# Patient Record
Sex: FEMALE | Race: BLACK OR AFRICAN AMERICAN | NOT HISPANIC OR LATINO | Employment: FULL TIME | URBAN - METROPOLITAN AREA
[De-identification: names, ages, dates, MRNs, and addresses within clinical notes are randomized per-mention and may not be internally consistent; named-entity substitution may affect disease eponyms.]

---

## 2023-12-23 ENCOUNTER — APPOINTMENT (OUTPATIENT)
Dept: RADIOLOGY | Facility: HOSPITAL | Age: 48
End: 2023-12-23
Payer: COMMERCIAL

## 2023-12-23 ENCOUNTER — HOSPITAL ENCOUNTER (EMERGENCY)
Facility: HOSPITAL | Age: 48
Discharge: HOME | End: 2023-12-24
Attending: STUDENT IN AN ORGANIZED HEALTH CARE EDUCATION/TRAINING PROGRAM
Payer: COMMERCIAL

## 2023-12-23 DIAGNOSIS — R10.9 FLANK PAIN: ICD-10-CM

## 2023-12-23 DIAGNOSIS — B37.9 YEAST INFECTION: ICD-10-CM

## 2023-12-23 DIAGNOSIS — N39.0 ACUTE UTI: Primary | ICD-10-CM

## 2023-12-23 LAB
ALBUMIN SERPL BCP-MCNC: 4.8 G/DL (ref 3.4–5)
ALP SERPL-CCNC: 74 U/L (ref 33–110)
ALT SERPL W P-5'-P-CCNC: 15 U/L (ref 7–45)
ANION GAP SERPL CALC-SCNC: 12 MMOL/L (ref 10–20)
APPEARANCE UR: ABNORMAL
AST SERPL W P-5'-P-CCNC: 14 U/L (ref 9–39)
BACTERIA #/AREA URNS AUTO: ABNORMAL /HPF
BASOPHILS # BLD AUTO: 0.04 X10*3/UL (ref 0–0.1)
BASOPHILS NFR BLD AUTO: 0.3 %
BILIRUB SERPL-MCNC: 0.8 MG/DL (ref 0–1.2)
BILIRUB UR STRIP.AUTO-MCNC: NEGATIVE MG/DL
BUN SERPL-MCNC: 15 MG/DL (ref 6–23)
CALCIUM SERPL-MCNC: 10.2 MG/DL (ref 8.6–10.3)
CARDIAC TROPONIN I PNL SERPL HS: 3 NG/L (ref 0–13)
CHLORIDE SERPL-SCNC: 102 MMOL/L (ref 98–107)
CO2 SERPL-SCNC: 28 MMOL/L (ref 21–32)
COLOR UR: YELLOW
CREAT SERPL-MCNC: 0.96 MG/DL (ref 0.5–1.05)
EOSINOPHIL # BLD AUTO: 0.23 X10*3/UL (ref 0–0.7)
EOSINOPHIL NFR BLD AUTO: 1.8 %
ERYTHROCYTE [DISTWIDTH] IN BLOOD BY AUTOMATED COUNT: 13.3 % (ref 11.5–14.5)
GFR SERPL CREATININE-BSD FRML MDRD: 74 ML/MIN/1.73M*2
GLUCOSE SERPL-MCNC: 99 MG/DL (ref 74–99)
GLUCOSE UR STRIP.AUTO-MCNC: NEGATIVE MG/DL
HCT VFR BLD AUTO: 41.7 % (ref 36–46)
HGB BLD-MCNC: 14.5 G/DL (ref 12–16)
IMM GRANULOCYTES # BLD AUTO: 0.1 X10*3/UL (ref 0–0.7)
IMM GRANULOCYTES NFR BLD AUTO: 0.8 % (ref 0–0.9)
KETONES UR STRIP.AUTO-MCNC: NEGATIVE MG/DL
LEUKOCYTE ESTERASE UR QL STRIP.AUTO: ABNORMAL
LIPASE SERPL-CCNC: 60 U/L (ref 9–82)
LYMPHOCYTES # BLD AUTO: 3.5 X10*3/UL (ref 1.2–4.8)
LYMPHOCYTES NFR BLD AUTO: 28 %
MCH RBC QN AUTO: 31 PG (ref 26–34)
MCHC RBC AUTO-ENTMCNC: 34.8 G/DL (ref 32–36)
MCV RBC AUTO: 89 FL (ref 80–100)
MONOCYTES # BLD AUTO: 0.44 X10*3/UL (ref 0.1–1)
MONOCYTES NFR BLD AUTO: 3.5 %
MUCOUS THREADS #/AREA URNS AUTO: ABNORMAL /LPF
NEUTROPHILS # BLD AUTO: 8.21 X10*3/UL (ref 1.2–7.7)
NEUTROPHILS NFR BLD AUTO: 65.6 %
NITRITE UR QL STRIP.AUTO: POSITIVE
NRBC BLD-RTO: 0 /100 WBCS (ref 0–0)
PH UR STRIP.AUTO: 6 [PH]
PLATELET # BLD AUTO: 309 X10*3/UL (ref 150–450)
POTASSIUM SERPL-SCNC: 4.2 MMOL/L (ref 3.5–5.3)
PROT SERPL-MCNC: 7.9 G/DL (ref 6.4–8.2)
PROT UR STRIP.AUTO-MCNC: NEGATIVE MG/DL
RBC # BLD AUTO: 4.67 X10*6/UL (ref 4–5.2)
RBC # UR STRIP.AUTO: NEGATIVE /UL
RBC #/AREA URNS AUTO: ABNORMAL /HPF
SODIUM SERPL-SCNC: 138 MMOL/L (ref 136–145)
SP GR UR STRIP.AUTO: 1.01
SQUAMOUS #/AREA URNS AUTO: ABNORMAL /HPF
UROBILINOGEN UR STRIP.AUTO-MCNC: 4 MG/DL
WBC # BLD AUTO: 12.5 X10*3/UL (ref 4.4–11.3)
WBC #/AREA URNS AUTO: ABNORMAL /HPF
WBC CLUMPS #/AREA URNS AUTO: ABNORMAL /HPF

## 2023-12-23 PROCEDURE — 99284 EMERGENCY DEPT VISIT MOD MDM: CPT | Performed by: STUDENT IN AN ORGANIZED HEALTH CARE EDUCATION/TRAINING PROGRAM

## 2023-12-23 PROCEDURE — 36415 COLL VENOUS BLD VENIPUNCTURE: CPT | Performed by: PHYSICIAN ASSISTANT

## 2023-12-23 PROCEDURE — 2550000001 HC RX 255 CONTRASTS: Performed by: PHYSICIAN ASSISTANT

## 2023-12-23 PROCEDURE — 99285 EMERGENCY DEPT VISIT HI MDM: CPT | Mod: 25

## 2023-12-23 PROCEDURE — 83690 ASSAY OF LIPASE: CPT | Performed by: PHYSICIAN ASSISTANT

## 2023-12-23 PROCEDURE — 81001 URINALYSIS AUTO W/SCOPE: CPT | Performed by: PHYSICIAN ASSISTANT

## 2023-12-23 PROCEDURE — 74177 CT ABD & PELVIS W/CONTRAST: CPT

## 2023-12-23 PROCEDURE — 71046 X-RAY EXAM CHEST 2 VIEWS: CPT

## 2023-12-23 PROCEDURE — 2500000004 HC RX 250 GENERAL PHARMACY W/ HCPCS (ALT 636 FOR OP/ED): Performed by: STUDENT IN AN ORGANIZED HEALTH CARE EDUCATION/TRAINING PROGRAM

## 2023-12-23 PROCEDURE — 96365 THER/PROPH/DIAG IV INF INIT: CPT

## 2023-12-23 PROCEDURE — 96375 TX/PRO/DX INJ NEW DRUG ADDON: CPT

## 2023-12-23 PROCEDURE — 74177 CT ABD & PELVIS W/CONTRAST: CPT | Performed by: RADIOLOGY

## 2023-12-23 PROCEDURE — 80053 COMPREHEN METABOLIC PANEL: CPT | Performed by: PHYSICIAN ASSISTANT

## 2023-12-23 PROCEDURE — 87086 URINE CULTURE/COLONY COUNT: CPT | Mod: PARLAB | Performed by: PHYSICIAN ASSISTANT

## 2023-12-23 PROCEDURE — 96361 HYDRATE IV INFUSION ADD-ON: CPT

## 2023-12-23 PROCEDURE — 71046 X-RAY EXAM CHEST 2 VIEWS: CPT | Performed by: RADIOLOGY

## 2023-12-23 PROCEDURE — 85025 COMPLETE CBC W/AUTO DIFF WBC: CPT | Performed by: PHYSICIAN ASSISTANT

## 2023-12-23 PROCEDURE — 84484 ASSAY OF TROPONIN QUANT: CPT | Performed by: PHYSICIAN ASSISTANT

## 2023-12-23 RX ORDER — CEFTRIAXONE 1 G/50ML
1 INJECTION, SOLUTION INTRAVENOUS ONCE
Status: COMPLETED | OUTPATIENT
Start: 2023-12-23 | End: 2023-12-23

## 2023-12-23 RX ORDER — ONDANSETRON HYDROCHLORIDE 2 MG/ML
4 INJECTION, SOLUTION INTRAVENOUS ONCE
Status: COMPLETED | OUTPATIENT
Start: 2023-12-23 | End: 2023-12-23

## 2023-12-23 RX ORDER — CEPHALEXIN 500 MG/1
500 CAPSULE ORAL 4 TIMES DAILY
Qty: 40 CAPSULE | Refills: 0 | Status: SHIPPED | OUTPATIENT
Start: 2023-12-23 | End: 2024-01-02

## 2023-12-23 RX ADMIN — HYDROMORPHONE HYDROCHLORIDE 0.5 MG: 1 INJECTION, SOLUTION INTRAMUSCULAR; INTRAVENOUS; SUBCUTANEOUS at 21:27

## 2023-12-23 RX ADMIN — SODIUM CHLORIDE, POTASSIUM CHLORIDE, SODIUM LACTATE AND CALCIUM CHLORIDE 1000 ML: 600; 310; 30; 20 INJECTION, SOLUTION INTRAVENOUS at 21:21

## 2023-12-23 RX ADMIN — CEFTRIAXONE SODIUM 1 G: 1 INJECTION, SOLUTION INTRAVENOUS at 20:21

## 2023-12-23 RX ADMIN — IOHEXOL 75 ML: 350 INJECTION, SOLUTION INTRAVENOUS at 22:19

## 2023-12-23 RX ADMIN — ONDANSETRON 4 MG: 2 INJECTION INTRAMUSCULAR; INTRAVENOUS at 21:21

## 2023-12-23 ASSESSMENT — COLUMBIA-SUICIDE SEVERITY RATING SCALE - C-SSRS
1. IN THE PAST MONTH, HAVE YOU WISHED YOU WERE DEAD OR WISHED YOU COULD GO TO SLEEP AND NOT WAKE UP?: NO
2. HAVE YOU ACTUALLY HAD ANY THOUGHTS OF KILLING YOURSELF?: NO
6. HAVE YOU EVER DONE ANYTHING, STARTED TO DO ANYTHING, OR PREPARED TO DO ANYTHING TO END YOUR LIFE?: NO

## 2023-12-23 ASSESSMENT — PAIN - FUNCTIONAL ASSESSMENT
PAIN_FUNCTIONAL_ASSESSMENT: 0-10
PAIN_FUNCTIONAL_ASSESSMENT: 0-10

## 2023-12-23 ASSESSMENT — PAIN SCALES - GENERAL
PAINLEVEL_OUTOF10: 10 - WORST POSSIBLE PAIN
PAINLEVEL_OUTOF10: 9

## 2023-12-23 ASSESSMENT — LIFESTYLE VARIABLES
EVER FELT BAD OR GUILTY ABOUT YOUR DRINKING: NO
REASON UNABLE TO ASSESS: NO
EVER HAD A DRINK FIRST THING IN THE MORNING TO STEADY YOUR NERVES TO GET RID OF A HANGOVER: NO
HAVE PEOPLE ANNOYED YOU BY CRITICIZING YOUR DRINKING: NO
HAVE YOU EVER FELT YOU SHOULD CUT DOWN ON YOUR DRINKING: NO

## 2023-12-24 VITALS
SYSTOLIC BLOOD PRESSURE: 123 MMHG | HEART RATE: 87 BPM | DIASTOLIC BLOOD PRESSURE: 75 MMHG | RESPIRATION RATE: 16 BRPM | OXYGEN SATURATION: 97 % | TEMPERATURE: 98.6 F | WEIGHT: 236 LBS

## 2023-12-24 LAB — HOLD SPECIMEN: NORMAL

## 2023-12-24 RX ORDER — FLUCONAZOLE 200 MG/1
200 TABLET ORAL DAILY
Qty: 7 TABLET | Refills: 0 | Status: SHIPPED | OUTPATIENT
Start: 2023-12-24 | End: 2023-12-31

## 2023-12-24 RX ORDER — ONDANSETRON 4 MG/1
4 TABLET, ORALLY DISINTEGRATING ORAL EVERY 8 HOURS PRN
Qty: 30 TABLET | Refills: 0 | Status: SHIPPED | OUTPATIENT
Start: 2023-12-24

## 2023-12-24 ASSESSMENT — PAIN SCALES - GENERAL: PAINLEVEL_OUTOF10: 3

## 2023-12-24 NOTE — ED PROVIDER NOTES
HPI   Chief Complaint   Patient presents with    Flank Pain    Blood in Urine    Chest Pain     Pt arrives private auto from home. States bilateral flank pain worse on left since Thursday. States feels pain in chest now and all over body. States blood when urinating. Hx of kidney stones, uti's. Has been taking left over cipro she had @ home for past few days.        HPI     Patient is a pleasant 48-year-old female presenting to the emergency department today in the setting of flank pain, worse on the left.  Began around Thursday.  States that she is noticing pain with urinating, incomplete feeling of emptying her bladder as well as she feels like she might have another kidney stone.  She tried taking some Cipro that she had at home for the last couple days.  Given lack of improvement came in for evaluation here.  States that she has had chills at home as well.  No cough, headache, vision changes, shortness of breath.               No data recorded                Patient History   History reviewed. No pertinent past medical history.  History reviewed. No pertinent surgical history.  No family history on file.  Social History     Tobacco Use    Smoking status: Not on file    Smokeless tobacco: Not on file   Substance Use Topics    Alcohol use: Not on file    Drug use: Not on file       Physical Exam   ED Triage Vitals [12/23/23 1605]   Temp Heart Rate Resp BP   37 °C (98.6 °F) 91 18 123/75      SpO2 Temp src Heart Rate Source Patient Position   97 % -- -- --      BP Location FiO2 (%)     -- --       Physical Exam  Vitals and nursing note reviewed.   Constitutional:       General: She is not in acute distress.     Appearance: She is well-developed.   HENT:      Head: Normocephalic and atraumatic.   Eyes:      Conjunctiva/sclera: Conjunctivae normal.   Cardiovascular:      Rate and Rhythm: Normal rate and regular rhythm.      Heart sounds: No murmur heard.  Pulmonary:      Effort: Pulmonary effort is normal. No  respiratory distress.      Breath sounds: Normal breath sounds.   Abdominal:      Palpations: Abdomen is soft.      Tenderness: There is no abdominal tenderness. There is left CVA tenderness. There is no right CVA tenderness.   Musculoskeletal:         General: No swelling.      Cervical back: Neck supple.   Skin:     General: Skin is warm and dry.      Capillary Refill: Capillary refill takes less than 2 seconds.   Neurological:      Mental Status: She is alert.   Psychiatric:         Mood and Affect: Mood normal.         ED Course & MDM   Diagnoses as of 12/24/23 1511   Acute UTI   Flank pain   Yeast infection       Medical Decision Making  Patient is a 48-year-old female present to the emergency department as above.  Arrival hemodynamically stable afebrile, nontachycardic and normotensive.  On bedside assessment she is uncomfortable, nontoxic.  She has reproducible left CVA tenderness.  Her workup is remarkable for a leukocytosis with urinary tract infection on urinalysis.  Is getting a CT abdomen pelvis for evaluation of possible kidney stone.  Given first dose of Rocephin and IV fluids, analgesia and antiemetics here in the emergency department.Patient's workup was reviewed here with her, she is found to have pyelonephritis based on her presentation but her CT is reassuring without any evidence of stone, abscess or infection otherwise noted.  Her CT is read as without acute process.  Her urinalysis is discussed with her has evidence of urinary tract infection.  She is given the first dose of IV antibiotics here and will be prescribed a course of Keflex for pyelonephritis treatment.  She is understanding this feels significantly improved here and controlled of symptoms.  Discussed return precautions such as worsening of symptoms despite treatment, development of persistent fevers despite treatment, development of nausea, vomiting or elevated to tolerate p.o. antibiotics.  Understands plan and will be discharged  home.    Procedure  Procedures     Genoveva Betancourt MD  12/24/23 0123

## 2023-12-26 ENCOUNTER — HOSPITAL ENCOUNTER (OUTPATIENT)
Dept: CARDIOLOGY | Facility: HOSPITAL | Age: 48
Discharge: HOME | End: 2023-12-26
Payer: COMMERCIAL

## 2023-12-26 LAB — BACTERIA UR CULT: ABNORMAL

## 2023-12-26 PROCEDURE — 93005 ELECTROCARDIOGRAM TRACING: CPT

## 2023-12-27 ENCOUNTER — TELEPHONE (OUTPATIENT)
Dept: PHARMACY | Facility: HOSPITAL | Age: 48
End: 2023-12-27
Payer: COMMERCIAL

## 2023-12-27 NOTE — PROGRESS NOTES
EDPD Note: Antibiotics Reviewed and Warranted    Contacted Ms. Tor Patino regarding a positive Escherichia coli urine culture/result that was taken during their recent emergency room visit. I completed education with patient . The patient is being treated appropriately with cephalexin 500 m capsule QID x 10 days given the resulting urine culture. Patient came to ED with symptoms of hematuria, bilateral flank pain (worse on left side), pain when urinating, and feeling as if they were not able to completely empty their bladder. Tried ciprofloxacin at home, but symptoms did not improve.    Patient reported symptoms just started to get better yesterday after completing a few days of the prescribed antibiotic. No worsening systemic symptoms to note. Patient did state they are still having some lower flank pain, but feels as if it is starting to improved as well. Patient did not imply any issues tolerating the current antibiotic, and did not have further questions for the pharmacy team. Note, patient was also given a prescription for fluconazole 200 m tablet daily x  7 days at discharge for a yeast infection (no lab results reported). Directed patient to contact their PCP or return to the ED if symptoms worsen/change. Patient acknowledged understanding.     Susceptibility data from last 90 days.  Collected Specimen Info Organism Ampicillin Cefazolin Cefazolin (uncomplicated UTIs only) Ciprofloxacin Gentamicin Nitrofurantoin Piperacillin/Tazobactam Trimethoprim/Sulfamethoxazole   23 Urine from Clean Catch/Voided Escherichia coli S S S S S S S S        No further follow up needed from EDPD Team.     Kristel Pemberton, PharmD

## 2024-01-10 LAB
ATRIAL RATE: 95 BPM
P AXIS: 59 DEGREES
P OFFSET: 193 MS
P ONSET: 134 MS
PR INTERVAL: 166 MS
Q ONSET: 217 MS
QRS COUNT: 16 BEATS
QRS DURATION: 94 MS
QT INTERVAL: 348 MS
QTC CALCULATION(BAZETT): 437 MS
QTC FREDERICIA: 405 MS
R AXIS: 23 DEGREES
T AXIS: 57 DEGREES
T OFFSET: 391 MS
VENTRICULAR RATE: 95 BPM

## 2024-08-26 ENCOUNTER — APPOINTMENT (OUTPATIENT)
Dept: CARDIOLOGY | Facility: HOSPITAL | Age: 49
End: 2024-08-26
Payer: COMMERCIAL

## 2024-08-26 ENCOUNTER — APPOINTMENT (OUTPATIENT)
Dept: RADIOLOGY | Facility: HOSPITAL | Age: 49
End: 2024-08-26
Payer: COMMERCIAL

## 2024-08-26 ENCOUNTER — HOSPITAL ENCOUNTER (EMERGENCY)
Facility: HOSPITAL | Age: 49
Discharge: HOME | End: 2024-08-27
Attending: EMERGENCY MEDICINE
Payer: COMMERCIAL

## 2024-08-26 DIAGNOSIS — R07.9 CHEST PAIN, UNSPECIFIED TYPE: ICD-10-CM

## 2024-08-26 DIAGNOSIS — N20.0 LEFT NEPHROLITHIASIS: Primary | ICD-10-CM

## 2024-08-26 LAB
ALBUMIN SERPL BCP-MCNC: 4.7 G/DL (ref 3.4–5)
ALP SERPL-CCNC: 56 U/L (ref 33–110)
ALT SERPL W P-5'-P-CCNC: 20 U/L (ref 7–45)
ANION GAP SERPL CALC-SCNC: 13 MMOL/L (ref 10–20)
AST SERPL W P-5'-P-CCNC: 16 U/L (ref 9–39)
BASOPHILS # BLD AUTO: 0.05 X10*3/UL (ref 0–0.1)
BASOPHILS NFR BLD AUTO: 0.5 %
BILIRUB SERPL-MCNC: 0.6 MG/DL (ref 0–1.2)
BUN SERPL-MCNC: 9 MG/DL (ref 6–23)
CALCIUM SERPL-MCNC: 9.6 MG/DL (ref 8.6–10.3)
CARDIAC TROPONIN I PNL SERPL HS: 3 NG/L (ref 0–13)
CARDIAC TROPONIN I PNL SERPL HS: <3 NG/L (ref 0–13)
CHLORIDE SERPL-SCNC: 103 MMOL/L (ref 98–107)
CO2 SERPL-SCNC: 26 MMOL/L (ref 21–32)
CREAT SERPL-MCNC: 0.83 MG/DL (ref 0.5–1.05)
EGFRCR SERPLBLD CKD-EPI 2021: 87 ML/MIN/1.73M*2
EOSINOPHIL # BLD AUTO: 0.34 X10*3/UL (ref 0–0.7)
EOSINOPHIL NFR BLD AUTO: 3.3 %
ERYTHROCYTE [DISTWIDTH] IN BLOOD BY AUTOMATED COUNT: 14 % (ref 11.5–14.5)
GLUCOSE SERPL-MCNC: 150 MG/DL (ref 74–99)
HCT VFR BLD AUTO: 39.9 % (ref 36–46)
HGB BLD-MCNC: 13.5 G/DL (ref 12–16)
IMM GRANULOCYTES # BLD AUTO: 0.06 X10*3/UL (ref 0–0.7)
IMM GRANULOCYTES NFR BLD AUTO: 0.6 % (ref 0–0.9)
INR PPP: 0.9 (ref 0.9–1.1)
LACTATE SERPL-SCNC: 1.8 MMOL/L (ref 0.4–2)
LIPASE SERPL-CCNC: 41 U/L (ref 9–82)
LYMPHOCYTES # BLD AUTO: 4.27 X10*3/UL (ref 1.2–4.8)
LYMPHOCYTES NFR BLD AUTO: 41.7 %
MCH RBC QN AUTO: 30.5 PG (ref 26–34)
MCHC RBC AUTO-ENTMCNC: 33.8 G/DL (ref 32–36)
MCV RBC AUTO: 90 FL (ref 80–100)
MONOCYTES # BLD AUTO: 0.37 X10*3/UL (ref 0.1–1)
MONOCYTES NFR BLD AUTO: 3.6 %
NEUTROPHILS # BLD AUTO: 5.14 X10*3/UL (ref 1.2–7.7)
NEUTROPHILS NFR BLD AUTO: 50.3 %
NRBC BLD-RTO: 0 /100 WBCS (ref 0–0)
PLATELET # BLD AUTO: 319 X10*3/UL (ref 150–450)
POTASSIUM SERPL-SCNC: 3.2 MMOL/L (ref 3.5–5.3)
PROT SERPL-MCNC: 7.5 G/DL (ref 6.4–8.2)
PROTHROMBIN TIME: 10.3 SECONDS (ref 9.8–12.8)
RBC # BLD AUTO: 4.42 X10*6/UL (ref 4–5.2)
SODIUM SERPL-SCNC: 139 MMOL/L (ref 136–145)
WBC # BLD AUTO: 10.2 X10*3/UL (ref 4.4–11.3)

## 2024-08-26 PROCEDURE — 93005 ELECTROCARDIOGRAM TRACING: CPT

## 2024-08-26 PROCEDURE — 36415 COLL VENOUS BLD VENIPUNCTURE: CPT | Performed by: EMERGENCY MEDICINE

## 2024-08-26 PROCEDURE — 84484 ASSAY OF TROPONIN QUANT: CPT | Performed by: STUDENT IN AN ORGANIZED HEALTH CARE EDUCATION/TRAINING PROGRAM

## 2024-08-26 PROCEDURE — 71045 X-RAY EXAM CHEST 1 VIEW: CPT | Performed by: RADIOLOGY

## 2024-08-26 PROCEDURE — 83605 ASSAY OF LACTIC ACID: CPT

## 2024-08-26 PROCEDURE — 84484 ASSAY OF TROPONIN QUANT: CPT | Performed by: EMERGENCY MEDICINE

## 2024-08-26 PROCEDURE — 36415 COLL VENOUS BLD VENIPUNCTURE: CPT | Performed by: STUDENT IN AN ORGANIZED HEALTH CARE EDUCATION/TRAINING PROGRAM

## 2024-08-26 PROCEDURE — 96361 HYDRATE IV INFUSION ADD-ON: CPT

## 2024-08-26 PROCEDURE — 84075 ASSAY ALKALINE PHOSPHATASE: CPT | Performed by: STUDENT IN AN ORGANIZED HEALTH CARE EDUCATION/TRAINING PROGRAM

## 2024-08-26 PROCEDURE — 96374 THER/PROPH/DIAG INJ IV PUSH: CPT | Mod: 59

## 2024-08-26 PROCEDURE — 85610 PROTHROMBIN TIME: CPT | Performed by: STUDENT IN AN ORGANIZED HEALTH CARE EDUCATION/TRAINING PROGRAM

## 2024-08-26 PROCEDURE — 85025 COMPLETE CBC W/AUTO DIFF WBC: CPT | Performed by: STUDENT IN AN ORGANIZED HEALTH CARE EDUCATION/TRAINING PROGRAM

## 2024-08-26 PROCEDURE — 80053 COMPREHEN METABOLIC PANEL: CPT | Performed by: EMERGENCY MEDICINE

## 2024-08-26 PROCEDURE — 85610 PROTHROMBIN TIME: CPT | Performed by: EMERGENCY MEDICINE

## 2024-08-26 PROCEDURE — 99285 EMERGENCY DEPT VISIT HI MDM: CPT | Mod: 25

## 2024-08-26 PROCEDURE — 74174 CTA ABD&PLVS W/CONTRAST: CPT | Performed by: RADIOLOGY

## 2024-08-26 PROCEDURE — 83690 ASSAY OF LIPASE: CPT | Performed by: EMERGENCY MEDICINE

## 2024-08-26 PROCEDURE — 85025 COMPLETE CBC W/AUTO DIFF WBC: CPT | Performed by: EMERGENCY MEDICINE

## 2024-08-26 PROCEDURE — 71275 CT ANGIOGRAPHY CHEST: CPT

## 2024-08-26 PROCEDURE — 2550000001 HC RX 255 CONTRASTS: Performed by: EMERGENCY MEDICINE

## 2024-08-26 PROCEDURE — 71275 CT ANGIOGRAPHY CHEST: CPT | Performed by: RADIOLOGY

## 2024-08-26 PROCEDURE — 96375 TX/PRO/DX INJ NEW DRUG ADDON: CPT | Mod: 59

## 2024-08-26 PROCEDURE — 2500000004 HC RX 250 GENERAL PHARMACY W/ HCPCS (ALT 636 FOR OP/ED)

## 2024-08-26 PROCEDURE — 81001 URINALYSIS AUTO W/SCOPE: CPT | Performed by: EMERGENCY MEDICINE

## 2024-08-26 PROCEDURE — 96376 TX/PRO/DX INJ SAME DRUG ADON: CPT | Mod: 59

## 2024-08-26 PROCEDURE — 71045 X-RAY EXAM CHEST 1 VIEW: CPT

## 2024-08-26 PROCEDURE — 2500000004 HC RX 250 GENERAL PHARMACY W/ HCPCS (ALT 636 FOR OP/ED): Performed by: EMERGENCY MEDICINE

## 2024-08-26 RX ORDER — HYDROMORPHONE HYDROCHLORIDE 1 MG/ML
1 INJECTION, SOLUTION INTRAMUSCULAR; INTRAVENOUS; SUBCUTANEOUS ONCE
Status: COMPLETED | OUTPATIENT
Start: 2024-08-26 | End: 2024-08-26

## 2024-08-26 RX ORDER — ONDANSETRON HYDROCHLORIDE 2 MG/ML
4 INJECTION, SOLUTION INTRAVENOUS ONCE
Status: COMPLETED | OUTPATIENT
Start: 2024-08-26 | End: 2024-08-26

## 2024-08-26 ASSESSMENT — PAIN SCALES - GENERAL
PAINLEVEL_OUTOF10: 4
PAINLEVEL_OUTOF10: 9
PAINLEVEL_OUTOF10: 4
PAINLEVEL_OUTOF10: 6

## 2024-08-26 ASSESSMENT — LIFESTYLE VARIABLES
HAVE PEOPLE ANNOYED YOU BY CRITICIZING YOUR DRINKING: NO
HAVE YOU EVER FELT YOU SHOULD CUT DOWN ON YOUR DRINKING: NO
TOTAL SCORE: 0
EVER HAD A DRINK FIRST THING IN THE MORNING TO STEADY YOUR NERVES TO GET RID OF A HANGOVER: NO
EVER FELT BAD OR GUILTY ABOUT YOUR DRINKING: NO

## 2024-08-26 ASSESSMENT — COLUMBIA-SUICIDE SEVERITY RATING SCALE - C-SSRS
1. IN THE PAST MONTH, HAVE YOU WISHED YOU WERE DEAD OR WISHED YOU COULD GO TO SLEEP AND NOT WAKE UP?: NO
6. HAVE YOU EVER DONE ANYTHING, STARTED TO DO ANYTHING, OR PREPARED TO DO ANYTHING TO END YOUR LIFE?: NO
2. HAVE YOU ACTUALLY HAD ANY THOUGHTS OF KILLING YOURSELF?: NO

## 2024-08-26 ASSESSMENT — PAIN DESCRIPTION - PAIN TYPE: TYPE: ACUTE PAIN

## 2024-08-26 ASSESSMENT — PAIN DESCRIPTION - LOCATION: LOCATION: CHEST

## 2024-08-26 ASSESSMENT — PAIN - FUNCTIONAL ASSESSMENT: PAIN_FUNCTIONAL_ASSESSMENT: 0-10

## 2024-08-27 VITALS
DIASTOLIC BLOOD PRESSURE: 94 MMHG | OXYGEN SATURATION: 100 % | HEIGHT: 64 IN | HEART RATE: 72 BPM | TEMPERATURE: 97.3 F | WEIGHT: 236 LBS | BODY MASS INDEX: 40.29 KG/M2 | SYSTOLIC BLOOD PRESSURE: 146 MMHG | RESPIRATION RATE: 18 BRPM

## 2024-08-27 LAB
APPEARANCE UR: CLEAR
BILIRUB UR STRIP.AUTO-MCNC: ABNORMAL MG/DL
COLOR UR: YELLOW
GLUCOSE UR STRIP.AUTO-MCNC: NEGATIVE MG/DL
HOLD SPECIMEN: NORMAL
KETONES UR STRIP.AUTO-MCNC: NEGATIVE MG/DL
LEUKOCYTE ESTERASE UR QL STRIP.AUTO: NEGATIVE
MUCOUS THREADS #/AREA URNS AUTO: NORMAL /LPF
NITRITE UR QL STRIP.AUTO: NEGATIVE
PH UR STRIP.AUTO: 6.5 [PH]
PROT UR STRIP.AUTO-MCNC: ABNORMAL MG/DL
RBC # UR STRIP.AUTO: NEGATIVE /UL
RBC #/AREA URNS AUTO: NORMAL /HPF
SP GR UR STRIP.AUTO: 1.01
UROBILINOGEN UR STRIP.AUTO-MCNC: ABNORMAL MG/DL
WBC #/AREA URNS AUTO: NORMAL /HPF

## 2024-08-27 PROCEDURE — 96376 TX/PRO/DX INJ SAME DRUG ADON: CPT

## 2024-08-27 PROCEDURE — 2500000004 HC RX 250 GENERAL PHARMACY W/ HCPCS (ALT 636 FOR OP/ED)

## 2024-08-27 PROCEDURE — 96361 HYDRATE IV INFUSION ADD-ON: CPT

## 2024-08-27 RX ORDER — ONDANSETRON HYDROCHLORIDE 2 MG/ML
4 INJECTION, SOLUTION INTRAVENOUS ONCE
Status: COMPLETED | OUTPATIENT
Start: 2024-08-27 | End: 2024-08-27

## 2024-08-27 RX ORDER — TAMSULOSIN HYDROCHLORIDE 0.4 MG/1
0.4 CAPSULE ORAL DAILY
Qty: 14 CAPSULE | Refills: 0 | Status: SHIPPED | OUTPATIENT
Start: 2024-08-27 | End: 2024-09-10

## 2024-08-27 RX ORDER — ONDANSETRON 4 MG/1
4 TABLET, ORALLY DISINTEGRATING ORAL EVERY 8 HOURS PRN
Qty: 20 TABLET | Refills: 0 | Status: SHIPPED | OUTPATIENT
Start: 2024-08-27 | End: 2024-09-03

## 2024-08-27 RX ORDER — OXYCODONE HYDROCHLORIDE 5 MG/1
5 TABLET ORAL EVERY 6 HOURS PRN
Qty: 10 TABLET | Refills: 0 | Status: SHIPPED | OUTPATIENT
Start: 2024-08-27

## 2024-08-27 ASSESSMENT — PAIN SCALES - GENERAL
PAINLEVEL_OUTOF10: 4
PAINLEVEL_OUTOF10: 9

## 2024-08-27 NOTE — DISCHARGE INSTRUCTIONS
Call to schedule follow-up appointment with your primary care physician with the next day or two.  Also included in your discharge paperwork is contact information for Dr. Cochran with urology.  Call to schedule a follow-up appointment with him in the morning.  Take your prescription oxycodone, Zofran, and Flomax as directed.  You may also take over-the-counter Tylenol and ibuprofen according to packaging directions.  Return to the emergency department medially for any new or worsening symptoms such as worsening abdomen/flank pain, chest pain, difficulty breathing, lightheadedness, nausea, vomiting, concerns for dehydration, fevers, chills, night sweats, or if you pass out.

## 2024-08-27 NOTE — ED PROVIDER NOTES
EMERGENCY DEPARTMENT ENCOUNTER      Pt Name: Tor Patino  MRN: [id number]  Birthdate 1975  Date of evaluation: 8/26/2024  Provider: Balta Correia DO    CHIEF COMPLAINT       Chief Complaint   Patient presents with    Chest Pain     PT AWOKE THIS AM WITH BACK AND BELLY PAIN. PT NOW WITH CHEST PAIN FOR THE PAST FEW HOURS. +NAUSEA. +VOMITING.          HISTORY OF PRESENT ILLNESS    HPI    49-year-old female with past medical history significant for kidney stones, pyelonephritis, remote history of PE/DVT, prior hysterectomy, hyperlipidemia presenting to the emergency department for evaluation of epigastric and left-sided back/flank pain radiating to the left shoulder.  Patient states she started to feel nauseous and have decreased appetite yesterday with dysuria and polyuria.  Pain started this morning and she has also experienced chills and hot flashes but has not checked a temperature.  Has had kidney stones in the past but she states this feels completely different.  Remote history of DVT which resulted in a PE after her hysterectomy and she is not on anticoagulation.  Describes the pain as a cramping sharp pain which she rates a 10 out of 10.  Pain worsens with sitting and improves with standing.  Patient states she has had her gallbladder and appendix removed in the past.    Nursing Notes were reviewed.    PAST MEDICAL HISTORY   No past medical history on file.      SURGICAL HISTORY     No past surgical history on file.      CURRENT MEDICATIONS       Previous Medications    No medications on file       ALLERGIES     Bee pollen, Indomethacin, Ketorolac, and Morphine    FAMILY HISTORY     No family history on file.       SOCIAL HISTORY       Social History     Socioeconomic History    Marital status: Single     Social Determinants of Health     Financial Resource Strain: Not on File (9/26/2019)    Received from [facility]    Financial Resource Strain     Financial Resource Strain: 0   Food Insecurity:  Not on File (2019)    Received from [facility]    Food Insecurity     Food: 0   Transportation Needs: Not on File (2019)    Received from [facility]    Transportation Needs     Transportation: 0   Physical Activity: Not on File (2019)    Received from [facility]    Physical Activity     Physical Activity: 0   Stress: Not on File (2019)    Received from [facility]    Stress     Stress: 0   Social Connections: Not on File (2019)    Received from [facility]    Social Connections     Social Connections and Isolation: 0   Housing Stability: Not on File (2019)    Received from [facility]    Housing Stability     Housin       SCREENINGS                        PHYSICAL EXAM    (up to 7 for level 4, 8 or more for level 5)     ED Triage Vitals [24 1837]   Temperature Heart Rate Respirations BP   36.3 °C (97.3 °F) 76 19 125/87      Pulse Ox Temp Source Heart Rate Source Patient Position   100 % Temporal Monitor Sitting      BP Location FiO2 (%)     Right arm --       Physical Exam  Vitals and nursing note reviewed.   Constitutional:       General: She is not in acute distress.     Appearance: She is obese. She is not ill-appearing, toxic-appearing or diaphoretic.   HENT:      Head: Normocephalic and atraumatic.   Neck:      Vascular: No JVD.   Cardiovascular:      Rate and Rhythm: Normal rate and regular rhythm.      Pulses:           Radial pulses are 2+ on the right side and 2+ on the left side.      Heart sounds: Normal heart sounds.   Pulmonary:      Effort: Pulmonary effort is normal. No tachypnea, accessory muscle usage or respiratory distress.      Breath sounds: Normal breath sounds. No stridor.   Chest:      Chest wall: No mass, deformity, tenderness, crepitus or edema.   Abdominal:      Comments: Abdomen is soft.  Rebound tenderness in the left lower quadrant.  Negative Gauthier's, Rovsing's, traders, psoas sign.  Positive CVA tenderness on the left.  No palpable  masses or hernias.   Musculoskeletal:         General: Normal range of motion.      Cervical back: Normal range of motion and neck supple.      Right lower leg: No tenderness. No edema.      Left lower leg: No tenderness. No edema.   Skin:     General: Skin is warm and dry.   Neurological:      General: No focal deficit present.      Mental Status: She is alert and oriented to person, place, and time.   Psychiatric:         Mood and Affect: Mood normal.         Behavior: Behavior normal.          DIAGNOSTIC RESULTS     LABS:  Labs Reviewed   COMPREHENSIVE METABOLIC PANEL - Abnormal       Result Value    Glucose 150 (*)     Sodium 139      Potassium 3.2 (*)     Chloride 103      Bicarbonate 26      Anion Gap 13      Urea Nitrogen 9      Creatinine 0.83      eGFR 87      Calcium 9.6      Albumin 4.7      Alkaline Phosphatase 56      Total Protein 7.5      AST 16      Bilirubin, Total 0.6      ALT 20     URINALYSIS WITH REFLEX CULTURE AND MICROSCOPIC - Abnormal    Color, Urine Yellow      Appearance, Urine Clear      Specific Gravity, Urine 1.010      pH, Urine 6.5      Protein, Urine 10 (TRACE)      Glucose, Urine NEGATIVE      Blood, Urine NEGATIVE      Ketones, Urine NEGATIVE      Bilirubin, Urine 0.5 (1+) (*)     Urobilinogen, Urine NORM      Nitrite, Urine NEGATIVE      Leukocyte Esterase, Urine NEGATIVE     PROTIME-INR - Normal    Protime 10.3      INR 0.9     SERIAL TROPONIN-INITIAL - Normal    Troponin I, High Sensitivity <3      Narrative:     Less than 99th percentile of normal range cutoff-  Female and children under 18 years old <14 ng/L; Male <21 ng/L: Negative  Repeat testing should be performed if clinically indicated.     Female and children under 18 years old 14-50 ng/L; Male 21-50 ng/L:  Consistent with possible cardiac damage and possible increased clinical   risk. Serial measurements may help to assess extent of myocardial damage.     >50 ng/L: Consistent with cardiac damage, increased clinical risk  and  myocardial infarction. Serial measurements may help assess extent of   myocardial damage.      NOTE: Children less than 1 year old may have higher baseline troponin   levels and results should be interpreted in conjunction with the overall   clinical context.     NOTE: Troponin I testing is performed using a different   testing methodology at [facility] than at other   [facility]. Direct result comparisons should only   be made within the same method.   SERIAL TROPONIN, 1 HOUR - Normal    Troponin I, High Sensitivity 3      Narrative:     Less than 99th percentile of normal range cutoff-  Female and children under 18 years old <14 ng/L; Male <21 ng/L: Negative  Repeat testing should be performed if clinically indicated.     Female and children under 18 years old 14-50 ng/L; Male 21-50 ng/L:  Consistent with possible cardiac damage and possible increased clinical   risk. Serial measurements may help to assess extent of myocardial damage.     >50 ng/L: Consistent with cardiac damage, increased clinical risk and  myocardial infarction. Serial measurements may help assess extent of   myocardial damage.      NOTE: Children less than 1 year old may have higher baseline troponin   levels and results should be interpreted in conjunction with the overall   clinical context.     NOTE: Troponin I testing is performed using a different   testing methodology at [facility] than at other   [facility]. Direct result comparisons should only   be made within the same method.   LIPASE - Normal    Lipase 41      Narrative:     Venipuncture immediately after or during the administration of Metamizole may lead to falsely low results. Testing should be performed immediately prior to Metamizole dosing.   LACTATE - Normal    Lactate 1.8      Narrative:     Venipuncture immediately after or during the administration of Metamizole may lead to falsely low results. Testing should be performed  immediately  prior to Metamizole dosing.   CBC WITH AUTO DIFFERENTIAL    WBC 10.2      nRBC 0.0      RBC 4.42      Hemoglobin 13.5      Hematocrit 39.9      MCV 90      MCH 30.5      MCHC 33.8      RDW 14.0      Platelets 319      Neutrophils % 50.3      Immature Granulocytes %, Automated 0.6      Lymphocytes % 41.7      Monocytes % 3.6      Eosinophils % 3.3      Basophils % 0.5      Neutrophils Absolute 5.14      Immature Granulocytes Absolute, Automated 0.06      Lymphocytes Absolute 4.27      Monocytes Absolute 0.37      Eosinophils Absolute 0.34      Basophils Absolute 0.05     TROPONIN SERIES- (INITIAL, 1 HR)    Narrative:     The following orders were created for panel order Troponin I Series, High Sensitivity (0, 1 HR).  Procedure                               Abnormality         Status                     ---------                               -----------         ------                     Troponin I, High Sensiti...[[id number]]  Normal              Final result               Troponin, High Sensitivi...[[id number]]  Normal              Final result                 Please view results for these tests on the individual orders.   URINALYSIS WITH REFLEX CULTURE AND MICROSCOPIC    Narrative:     The following orders were created for panel order Urinalysis with Reflex Culture and Microscopic.  Procedure                               Abnormality         Status                     ---------                               -----------         ------                     Urinalysis with Reflex C...[[id number]]  Abnormal            Final result               Extra Urine Gray Tube[[id number]]                            In process                   Please view results for these tests on the individual orders.   EXTRA URINE GRAY TUBE   URINALYSIS MICROSCOPIC WITH REFLEX CULTURE    WBC, Urine 1-5      RBC, Urine 1-2      Mucus, Urine FEW         All other labs were within normal range or not returned as of this  dictation.    Imaging  CT angio chest abdomen pelvis   Final Result   No aortic aneurysm or dissection.        Nephrolithiasis bilaterally without hydronephrosis. Possible new,   nonobstructing distal left ureteral calculus versus phlebolith.        Moderate stool burden, diverticulosis and additional findings as   detailed.        MACRO:   None.        Signed by: Sarah Forbes 8/26/2024 11:17 PM   Dictation workstation:   [id number]      XR chest 1 view   Final Result   1. No acute cardiopulmonary process.        MACRO:   None.        Signed by: Gris Givens 8/26/2024 8:25 PM   Dictation workstation:   [id number]           Procedures  Procedures     EMERGENCY DEPARTMENT COURSE/MDM:     ED Course as of 08/27/24 0116   Mon Aug 26, 2024   1848 EKG performed at 18: 38 and independently reviewed by provider: Reveals NSR with a rate of 76 bpm, normal axis, normal intervals, no ST changes, no T wave abnormalities, no ectopy. No STEMI. [TL]   2342 IMPRESSION:  No aortic aneurysm or dissection.      Nephrolithiasis bilaterally without hydronephrosis. Possible new,  nonobstructing distal left ureteral calculus versus phlebolith.      Moderate stool burden, diverticulosis and additional findings as  detailed.       [TL]   Tue Aug 27, 2024   0014 Patient signed out to Dr. Gotti at this time.  [TL]      ED Course User Index  [TL] Feng Gonzales DO         Diagnoses as of 08/27/24 0116   Left nephrolithiasis   Chest pain, unspecified type        Medical Decision Making    49-year-old female with past medical history significant for kidney stones, pyelonephritis, remote history of PE/DVT, prior hysterectomy, hyperlipidemia presenting to the emergency department for evaluation of epigastric and left-sided back/flank pain radiating to the left shoulder.  Patient is hemodynamically stable, appears uncomfortable but no acute distress, nontoxic-appearing, afebrile.  Given patient's epigastric/chest pain that radiates to the  shoulder with history of DVT and PE CT angio chest abdomen pelvis ordered to evaluate for possible PE or dissection.  Cardiac workup and acute abdominal labs ordered including urinalysis to evaluate for possible pyelonephritis.  Patient has a listed allergy of morphine however states her reaction involved feeling nauseous after receiving morphine during her hysterectomy and is not a true allergy.  1 mg of Dilaudid and 4 mg of IV Zofran was ordered in addition to 1 L bolus of LR.    EKG: Normal sinus rhythm with sinus arrhythmia with a ventricular rate of 76 bpm, VT interval 170 ms, QRS 80 ms, QTc 441 ms.  No evidence of acute ST changes noted    Awaiting on labs and imaging patient's pain pain and nausea recurred requiring 2 additional doses of 0.5 mg of morphine and an additional 2 doses of 4 mg of IV Zofran.  Labs unremarkable for acute pathology. CT scan shows bilateral nephrolithiasis with nonobstructing distal left ureteral calculus without evidence of hydronephrosis or pyelonephritis.  Patient also has a moderate stool burden.  Otherwise no evidence of acute pathology on CT or x-ray.     Admission required 3 doses of Dilaudid and 3 doses of Zofran for symptom control in the emergency department patient was offered admission.  After shared decision making, patient opted for discharge home with prescriptions for oxycodone, Zofran, Flomax for outpatient follow-up with her primary care physician.  Discharged with referral for urology and given strict return precautions.    Patient and or family in agreement and understanding of treatment plan.  All questions answered.      I reviewed the case with the attending ED physician. The attending ED physician agrees with the plan. Patient and/or patient´s representative was counseled regarding labs, imaging, likely diagnosis, and plan. All questions were answered.    ED Medications administered this visit:    Medications   HYDROmorphone (Dilaudid) injection 1 mg (1 mg  intravenous Given 8/26/24 2017)   ondansetron (Zofran) injection 4 mg (4 mg intravenous Given 8/26/24 2017)   lactated Ringer's bolus 1,000 mL (1,000 mL intravenous New Bag 8/26/24 2017)   ondansetron (Zofran) injection 4 mg (4 mg intravenous Given 8/26/24 2120)   HYDROmorphone (Dilaudid) injection 0.5 mg (0.5 mg intravenous Given 8/26/24 2132)   iohexol (OMNIPaque) 350 mg iodine/mL solution 90 mL (90 mL intravenous Given 8/26/24 2203)   HYDROmorphone (Dilaudid) injection 0.5 mg (0.5 mg intravenous Given 8/27/24 0028)   ondansetron (Zofran) injection 4 mg (4 mg intravenous Given 8/27/24 0028)       New Prescriptions from this visit:    New Prescriptions    ONDANSETRON ODT (ZOFRAN-ODT) 4 MG DISINTEGRATING TABLET    Take 1 tablet (4 mg) by mouth every 8 hours if needed for nausea or vomiting for up to 7 days.    OXYCODONE (ROXICODONE) 5 MG IMMEDIATE RELEASE TABLET    Take 1 tablet (5 mg) by mouth every 6 hours if needed for severe pain (7 - 10) for up to 10 doses.    TAMSULOSIN (FLOMAX) 0.4 MG 24 HR CAPSULE    Take 1 capsule (0.4 mg) by mouth once daily for 14 days. Until stone passes.       Follow-up:  Milind Montilla MD  [id number] [facility] [address] [phone]    In 1 day      Elliott Avila MD  [address]  [facility] [address]  [phone]    In 1 day          Final Impression:   1. Left nephrolithiasis    2. Chest pain, unspecified type          (Please note that portions of this note were completed with a voice recognition program.  Efforts were made to edit the dictations but occasionally words are mis-transcribed.)     Balta Correia,   Resident  08/27/24 0119

## 2024-08-28 LAB
ATRIAL RATE: 75 BPM
ATRIAL RATE: 76 BPM
P AXIS: 69 DEGREES
P AXIS: 71 DEGREES
P OFFSET: 192 MS
P OFFSET: 195 MS
P ONSET: 128 MS
P ONSET: 136 MS
PR INTERVAL: 170 MS
PR INTERVAL: 170 MS
Q ONSET: 213 MS
Q ONSET: 221 MS
QRS COUNT: 12 BEATS
QRS COUNT: 13 BEATS
QRS DURATION: 88 MS
QRS DURATION: 94 MS
QT INTERVAL: 392 MS
QT INTERVAL: 392 MS
QTC CALCULATION(BAZETT): 437 MS
QTC CALCULATION(BAZETT): 441 MS
QTC FREDERICIA: 422 MS
QTC FREDERICIA: 424 MS
R AXIS: 0 DEGREES
R AXIS: 26 DEGREES
T AXIS: 56 DEGREES
T AXIS: 66 DEGREES
T OFFSET: 409 MS
T OFFSET: 417 MS
VENTRICULAR RATE: 75 BPM
VENTRICULAR RATE: 76 BPM

## 2024-09-08 ENCOUNTER — HOSPITAL ENCOUNTER (EMERGENCY)
Facility: HOSPITAL | Age: 49
Discharge: HOME | End: 2024-09-09
Payer: COMMERCIAL

## 2024-09-08 VITALS
TEMPERATURE: 97.3 F | DIASTOLIC BLOOD PRESSURE: 60 MMHG | BODY MASS INDEX: 39.27 KG/M2 | HEART RATE: 89 BPM | SYSTOLIC BLOOD PRESSURE: 131 MMHG | HEIGHT: 64 IN | WEIGHT: 230 LBS | RESPIRATION RATE: 20 BRPM | OXYGEN SATURATION: 98 %

## 2024-09-08 DIAGNOSIS — N20.0 RENAL CALCULI: Primary | ICD-10-CM

## 2024-09-08 LAB
APPEARANCE UR: ABNORMAL
BILIRUB UR STRIP.AUTO-MCNC: NEGATIVE MG/DL
COLOR UR: ABNORMAL
GLUCOSE UR STRIP.AUTO-MCNC: NORMAL MG/DL
HCG UR QL IA.RAPID: NEGATIVE
KETONES UR STRIP.AUTO-MCNC: NEGATIVE MG/DL
LEUKOCYTE ESTERASE UR QL STRIP.AUTO: ABNORMAL
NITRITE UR QL STRIP.AUTO: NEGATIVE
PH UR STRIP.AUTO: 6.5 [PH]
PROT UR STRIP.AUTO-MCNC: NEGATIVE MG/DL
RBC # UR STRIP.AUTO: ABNORMAL /UL
RBC #/AREA URNS AUTO: ABNORMAL /HPF
SP GR UR STRIP.AUTO: 1.02
SQUAMOUS #/AREA URNS AUTO: ABNORMAL /HPF
UROBILINOGEN UR STRIP.AUTO-MCNC: NORMAL MG/DL
WBC #/AREA URNS AUTO: ABNORMAL /HPF

## 2024-09-08 PROCEDURE — 81001 URINALYSIS AUTO W/SCOPE: CPT | Performed by: STUDENT IN AN ORGANIZED HEALTH CARE EDUCATION/TRAINING PROGRAM

## 2024-09-08 PROCEDURE — 83605 ASSAY OF LACTIC ACID: CPT | Performed by: NURSE PRACTITIONER

## 2024-09-08 PROCEDURE — 83735 ASSAY OF MAGNESIUM: CPT | Performed by: NURSE PRACTITIONER

## 2024-09-08 PROCEDURE — 99284 EMERGENCY DEPT VISIT MOD MDM: CPT | Mod: 25

## 2024-09-08 PROCEDURE — 85025 COMPLETE CBC W/AUTO DIFF WBC: CPT | Performed by: NURSE PRACTITIONER

## 2024-09-08 PROCEDURE — 83690 ASSAY OF LIPASE: CPT | Performed by: NURSE PRACTITIONER

## 2024-09-08 PROCEDURE — 36415 COLL VENOUS BLD VENIPUNCTURE: CPT | Performed by: NURSE PRACTITIONER

## 2024-09-08 PROCEDURE — 81025 URINE PREGNANCY TEST: CPT | Performed by: STUDENT IN AN ORGANIZED HEALTH CARE EDUCATION/TRAINING PROGRAM

## 2024-09-08 PROCEDURE — 80053 COMPREHEN METABOLIC PANEL: CPT | Performed by: NURSE PRACTITIONER

## 2024-09-08 RX ORDER — HYDROMORPHONE HYDROCHLORIDE 1 MG/ML
1 INJECTION, SOLUTION INTRAMUSCULAR; INTRAVENOUS; SUBCUTANEOUS ONCE
Status: COMPLETED | OUTPATIENT
Start: 2024-09-09 | End: 2024-09-09

## 2024-09-08 RX ORDER — ONDANSETRON HYDROCHLORIDE 2 MG/ML
4 INJECTION, SOLUTION INTRAVENOUS ONCE
Status: COMPLETED | OUTPATIENT
Start: 2024-09-09 | End: 2024-09-09

## 2024-09-08 ASSESSMENT — PAIN DESCRIPTION - DIRECTION: RADIATING_TOWARDS: ABDOMEN

## 2024-09-08 ASSESSMENT — PAIN DESCRIPTION - FREQUENCY: FREQUENCY: CONSTANT/CONTINUOUS

## 2024-09-08 ASSESSMENT — PAIN DESCRIPTION - PAIN TYPE: TYPE: ACUTE PAIN

## 2024-09-08 ASSESSMENT — PAIN SCALES - GENERAL: PAINLEVEL_OUTOF10: 10 - WORST POSSIBLE PAIN

## 2024-09-08 ASSESSMENT — PAIN DESCRIPTION - ONSET: ONSET: SUDDEN

## 2024-09-08 ASSESSMENT — PAIN DESCRIPTION - ORIENTATION: ORIENTATION: LEFT

## 2024-09-08 ASSESSMENT — PAIN - FUNCTIONAL ASSESSMENT: PAIN_FUNCTIONAL_ASSESSMENT: 0-10

## 2024-09-08 ASSESSMENT — PAIN DESCRIPTION - PROGRESSION: CLINICAL_PROGRESSION: RAPIDLY WORSENING

## 2024-09-08 ASSESSMENT — PAIN DESCRIPTION - DESCRIPTORS: DESCRIPTORS: SHARP;STABBING

## 2024-09-08 ASSESSMENT — PAIN DESCRIPTION - LOCATION: LOCATION: BACK

## 2024-09-09 ENCOUNTER — APPOINTMENT (OUTPATIENT)
Dept: RADIOLOGY | Facility: HOSPITAL | Age: 49
End: 2024-09-09
Payer: COMMERCIAL

## 2024-09-09 PROBLEM — E04.9 GOITER: Status: ACTIVE | Noted: 2020-01-15

## 2024-09-09 PROBLEM — Z86.718 HISTORY OF THROMBOEMBOLISM OF VEIN: Status: ACTIVE | Noted: 2020-01-15

## 2024-09-09 PROBLEM — L85.3 XEROSIS CUTIS: Status: ACTIVE | Noted: 2020-01-15

## 2024-09-09 PROBLEM — M54.9 BACKACHE: Status: ACTIVE | Noted: 2024-09-09

## 2024-09-09 PROBLEM — L85.1 ACQUIRED KERATODERMA: Status: ACTIVE | Noted: 2020-01-15

## 2024-09-09 PROBLEM — N20.0 KIDNEY STONE: Status: ACTIVE | Noted: 2024-09-09

## 2024-09-09 PROBLEM — N73.6 PELVIC ADHESIVE DISEASE: Status: ACTIVE | Noted: 2019-01-30

## 2024-09-09 PROBLEM — K59.00 CONSTIPATION: Status: ACTIVE | Noted: 2024-03-27

## 2024-09-09 PROBLEM — D23.70 BENIGN NEOPLASM OF SKIN OF LOWER LIMB: Status: ACTIVE | Noted: 2020-01-15

## 2024-09-09 PROBLEM — N39.0 CHRONIC URINARY TRACT INFECTION: Status: ACTIVE | Noted: 2024-09-09

## 2024-09-09 PROBLEM — K56.609 SMALL BOWEL OBSTRUCTION (MULTI): Status: ACTIVE | Noted: 2024-09-09

## 2024-09-09 PROBLEM — G56.00 CARPAL TUNNEL SYNDROME: Status: ACTIVE | Noted: 2020-01-15

## 2024-09-09 PROBLEM — N80.9 ENDOMETRIOSIS: Status: ACTIVE | Noted: 2024-09-09

## 2024-09-09 PROBLEM — E78.2 MIXED HYPERLIPIDEMIA: Status: ACTIVE | Noted: 2020-01-15

## 2024-09-09 PROBLEM — E66.09 OBESITY DUE TO EXCESS CALORIES: Status: ACTIVE | Noted: 2019-03-08

## 2024-09-09 LAB
ALBUMIN SERPL BCP-MCNC: 4.7 G/DL (ref 3.4–5)
ALP SERPL-CCNC: 58 U/L (ref 33–110)
ALT SERPL W P-5'-P-CCNC: 18 U/L (ref 7–45)
ANION GAP SERPL CALC-SCNC: 13 MMOL/L (ref 10–20)
AST SERPL W P-5'-P-CCNC: 12 U/L (ref 9–39)
BASOPHILS # BLD AUTO: 0.03 X10*3/UL (ref 0–0.1)
BASOPHILS NFR BLD AUTO: 0.4 %
BILIRUB SERPL-MCNC: 0.8 MG/DL (ref 0–1.2)
BUN SERPL-MCNC: 6 MG/DL (ref 6–23)
CALCIUM SERPL-MCNC: 9.9 MG/DL (ref 8.6–10.3)
CHLORIDE SERPL-SCNC: 103 MMOL/L (ref 98–107)
CO2 SERPL-SCNC: 27 MMOL/L (ref 21–32)
CREAT SERPL-MCNC: 0.75 MG/DL (ref 0.5–1.05)
EGFRCR SERPLBLD CKD-EPI 2021: >90 ML/MIN/1.73M*2
EOSINOPHIL # BLD AUTO: 0.42 X10*3/UL (ref 0–0.7)
EOSINOPHIL NFR BLD AUTO: 5.6 %
ERYTHROCYTE [DISTWIDTH] IN BLOOD BY AUTOMATED COUNT: 13 % (ref 11.5–14.5)
GLUCOSE SERPL-MCNC: 89 MG/DL (ref 74–99)
HCT VFR BLD AUTO: 40.2 % (ref 36–46)
HGB BLD-MCNC: 14.2 G/DL (ref 12–16)
IMM GRANULOCYTES # BLD AUTO: 0.01 X10*3/UL (ref 0–0.7)
IMM GRANULOCYTES NFR BLD AUTO: 0.1 % (ref 0–0.9)
LACTATE SERPL-SCNC: 1.3 MMOL/L (ref 0.4–2)
LIPASE SERPL-CCNC: 47 U/L (ref 9–82)
LYMPHOCYTES # BLD AUTO: 3.5 X10*3/UL (ref 1.2–4.8)
LYMPHOCYTES NFR BLD AUTO: 46.3 %
MAGNESIUM SERPL-MCNC: 1.91 MG/DL (ref 1.6–2.4)
MCH RBC QN AUTO: 31.2 PG (ref 26–34)
MCHC RBC AUTO-ENTMCNC: 35.3 G/DL (ref 32–36)
MCV RBC AUTO: 88 FL (ref 80–100)
MONOCYTES # BLD AUTO: 0.46 X10*3/UL (ref 0.1–1)
MONOCYTES NFR BLD AUTO: 6.1 %
NEUTROPHILS # BLD AUTO: 3.14 X10*3/UL (ref 1.2–7.7)
NEUTROPHILS NFR BLD AUTO: 41.5 %
NRBC BLD-RTO: 0 /100 WBCS (ref 0–0)
PLATELET # BLD AUTO: 308 X10*3/UL (ref 150–450)
POTASSIUM SERPL-SCNC: 3.8 MMOL/L (ref 3.5–5.3)
PROT SERPL-MCNC: 7.5 G/DL (ref 6.4–8.2)
RBC # BLD AUTO: 4.55 X10*6/UL (ref 4–5.2)
SODIUM SERPL-SCNC: 139 MMOL/L (ref 136–145)
WBC # BLD AUTO: 7.6 X10*3/UL (ref 4.4–11.3)

## 2024-09-09 PROCEDURE — 96361 HYDRATE IV INFUSION ADD-ON: CPT

## 2024-09-09 PROCEDURE — 2500000004 HC RX 250 GENERAL PHARMACY W/ HCPCS (ALT 636 FOR OP/ED): Performed by: NURSE PRACTITIONER

## 2024-09-09 PROCEDURE — 96375 TX/PRO/DX INJ NEW DRUG ADDON: CPT

## 2024-09-09 PROCEDURE — 74177 CT ABD & PELVIS W/CONTRAST: CPT | Performed by: RADIOLOGY

## 2024-09-09 PROCEDURE — 96374 THER/PROPH/DIAG INJ IV PUSH: CPT

## 2024-09-09 PROCEDURE — 74177 CT ABD & PELVIS W/CONTRAST: CPT

## 2024-09-09 PROCEDURE — 2550000001 HC RX 255 CONTRASTS: Performed by: NURSE PRACTITIONER

## 2024-09-09 RX ORDER — KETOROLAC TROMETHAMINE 10 MG/1
10 TABLET, FILM COATED ORAL EVERY 6 HOURS PRN
Qty: 20 TABLET | Refills: 0 | Status: SHIPPED | OUTPATIENT
Start: 2024-09-09 | End: 2024-09-14

## 2024-09-09 RX ORDER — ONDANSETRON HYDROCHLORIDE 2 MG/ML
INJECTION, SOLUTION INTRAVENOUS
Status: COMPLETED
Start: 2024-09-09 | End: 2024-09-09

## 2024-09-09 RX ORDER — TAMSULOSIN HYDROCHLORIDE 0.4 MG/1
0.4 CAPSULE ORAL DAILY
Qty: 14 CAPSULE | Refills: 0 | Status: SHIPPED | OUTPATIENT
Start: 2024-09-09 | End: 2024-09-23

## 2024-09-09 ASSESSMENT — PAIN SCALES - GENERAL
PAINLEVEL_OUTOF10: 7
PAINLEVEL_OUTOF10: 10 - WORST POSSIBLE PAIN

## 2024-09-09 NOTE — ED PROVIDER NOTES
"HPI   Chief Complaint   Patient presents with    Flank Pain     Left sided that wraps around to the abdomen. No difficulty urinating, but \"some blood in my urine\"       Patient is a healthy nontoxic-appearing uncomfortable 49-year-old female with a past medical history of anxiety, carpal tunnel syndrome, chronic urinary tract infection, renal calculi, constipation, endometriosis, leukocytosis, hyperlipidemia, neuropathy, obesity, opioid dependence abuse, pulmonary embolism, small bowel obstruction, presents the emergency today for complaint of flank pain.  Patient states for the past several days she denies.  Seeing worsening bilateral back pain and abdominal pain.  Patient states she has had a fever as high as 102 with nausea as well.  Patient states she was recently seen and evaluated in the emergency room where she was diagnosed with kidney stones and states she has appointment to see urology however has not done so yet.  Patient said she was concerned may be experiencing worsening pain secondary to kidney stones.  Patient denies any new injuries trauma or falls, loss of bowel or bladder control, urinary complaints, diarrhea or constipation, chest pain, shortness of breath difficulty breathing.              Patient History   History reviewed. No pertinent past medical history.  History reviewed. No pertinent surgical history.  No family history on file.  Social History     Tobacco Use    Smoking status: Not on file    Smokeless tobacco: Not on file   Substance Use Topics    Alcohol use: Not on file    Drug use: Not on file       Physical Exam   ED Triage Vitals [09/08/24 2223]   Temperature Heart Rate Respirations BP   36.3 °C (97.3 °F) 89 20 131/60      Pulse Ox Temp Source Heart Rate Source Patient Position   98 % Temporal Monitor Sitting      BP Location FiO2 (%)     Right arm --       Physical Exam  Vitals and nursing note reviewed. Exam conducted with a chaperone present.   Constitutional:       General: She " is not in acute distress.     Appearance: Normal appearance. She is not ill-appearing, toxic-appearing or diaphoretic.   HENT:      Head: Normocephalic.      Nose: Nose normal.      Mouth/Throat:      Mouth: Mucous membranes are moist.   Eyes:      Extraocular Movements: Extraocular movements intact.      Pupils: Pupils are equal, round, and reactive to light.   Cardiovascular:      Rate and Rhythm: Normal rate and regular rhythm.      Pulses: Normal pulses.      Heart sounds: Normal heart sounds. No murmur heard.     No friction rub. No gallop.   Pulmonary:      Effort: Pulmonary effort is normal. No respiratory distress.      Breath sounds: Normal breath sounds. No stridor. No wheezing, rhonchi or rales.   Chest:      Chest wall: No tenderness.   Abdominal:      General: Abdomen is flat. There is no distension.      Palpations: Abdomen is soft. There is no mass.      Tenderness: There is abdominal tenderness. There is right CVA tenderness and left CVA tenderness. There is no guarding or rebound.      Hernia: No hernia is present.   Musculoskeletal:         General: Normal range of motion.      Cervical back: Normal range of motion and neck supple.   Skin:     General: Skin is warm and dry.      Capillary Refill: Capillary refill takes less than 2 seconds.      Coloration: Skin is not jaundiced or pale.      Findings: No bruising, erythema, lesion or rash.   Neurological:      Mental Status: She is alert.           ED Course & [facility]   ED Course as of 09/09/24 0413   Mon Sep 09, 2024   0204 CT abdomen and pelvis reveals  IMPRESSION:  1. 3 mm calculus in the left mid ureter without hydronephrosis or  hydroureter.  2. Nonobstructing 3 mm left lower pole calculus.  3. Status post hysterectomy and cholecystectomy.   [EC]      ED Course User Index  [EC] Sal Shea, APRN-CNP         Diagnoses as of 09/09/24 0413   Renal calculi                 No data recorded     [place] Coma Scale Score: 15 (09/08/24 2322 : Reyna DODGE  FRANKI Knight)                           Medical Decision Making  Given patient's complaint presentation a thorough exam was performed.  History and exam patient is complaining of diffuse abdominal pain and bilateral CVA tenderness upon palpation, bowel sounds present in all 4 quadrants, no adventitious lung sounds auscultated, speaking complete sentences no respiratory distress, remains hemodynamically stable during emergency evaluation, is afebrile.  Previous emergency evaluation was reviewed as well. Lab work was ordered including CT of the abdomen and pelvis.  Lab work revealed several irregularities without any critical lab values.  CT of the abdomen and pelvis as interpreted by radiologist reveals 3 mm calculus in the left mid ureter without hydronephrosis or hydroureter, nonobstructing 3 mm left lower pole calculus.  I do suspect patient likely experiencing discomfort secondary to renal calculi.  Patient received prescription for Toradol as well as Flomax and strongly encouraged following up with urology as previously arranged.  I encourage patient monitor symptoms, if they become worse return to emergency room for further evaluation.  Patient was agreeable with this plan and discharged home in stable condition.    ANDREA Stacy     Portions of this note were generated using digital voice recognition software, and may contain grammatical errors      Procedure  Procedures     ANDREA Stacy  09/09/24 0413

## 2024-09-16 ENCOUNTER — HOSPITAL ENCOUNTER (EMERGENCY)
Facility: HOSPITAL | Age: 49
Discharge: LEFT BEFORE DISCHARGE | End: 2024-09-16
Payer: COMMERCIAL

## 2024-09-16 PROCEDURE — 4500999001 HC ED NO CHARGE

## 2024-09-20 ENCOUNTER — APPOINTMENT (OUTPATIENT)
Dept: UROLOGY | Facility: HOSPITAL | Age: 49
End: 2024-09-20
Payer: COMMERCIAL

## 2024-10-01 ENCOUNTER — LAB (OUTPATIENT)
Dept: LAB | Facility: LAB | Age: 49
End: 2024-10-01
Payer: COMMERCIAL

## 2024-10-01 DIAGNOSIS — E66.01 MORBID (SEVERE) OBESITY DUE TO EXCESS CALORIES (MULTI): Primary | ICD-10-CM

## 2024-10-01 DIAGNOSIS — E88.810 METABOLIC SYNDROME: ICD-10-CM

## 2024-10-01 DIAGNOSIS — E78.2 MIXED HYPERLIPIDEMIA: ICD-10-CM

## 2024-10-01 DIAGNOSIS — E28.2 POLYCYSTIC OVARIAN SYNDROME: ICD-10-CM

## 2024-10-01 LAB
ALBUMIN SERPL BCP-MCNC: 4.6 G/DL (ref 3.4–5)
ALP SERPL-CCNC: 53 U/L (ref 33–110)
ALT SERPL W P-5'-P-CCNC: 14 U/L (ref 7–45)
ANION GAP SERPL CALC-SCNC: 12 MMOL/L (ref 10–20)
AST SERPL W P-5'-P-CCNC: 15 U/L (ref 9–39)
BILIRUB SERPL-MCNC: 1.1 MG/DL (ref 0–1.2)
BUN SERPL-MCNC: 10 MG/DL (ref 6–23)
CALCIUM SERPL-MCNC: 9.2 MG/DL (ref 8.6–10.3)
CHLORIDE SERPL-SCNC: 106 MMOL/L (ref 98–107)
CHOLEST SERPL-MCNC: 155 MG/DL (ref 0–199)
CHOLESTEROL/HDL RATIO: 3.1
CO2 SERPL-SCNC: 25 MMOL/L (ref 21–32)
CREAT SERPL-MCNC: 0.85 MG/DL (ref 0.5–1.05)
EGFRCR SERPLBLD CKD-EPI 2021: 85 ML/MIN/1.73M*2
ERYTHROCYTE [DISTWIDTH] IN BLOOD BY AUTOMATED COUNT: 13.6 % (ref 11.5–14.5)
GLUCOSE SERPL-MCNC: 81 MG/DL (ref 74–99)
HCT VFR BLD AUTO: 38.5 % (ref 36–46)
HDLC SERPL-MCNC: 50.1 MG/DL
HGB BLD-MCNC: 12.8 G/DL (ref 12–16)
LDLC SERPL CALC-MCNC: 83 MG/DL
MCH RBC QN AUTO: 30.3 PG (ref 26–34)
MCHC RBC AUTO-ENTMCNC: 33.2 G/DL (ref 32–36)
MCV RBC AUTO: 91 FL (ref 80–100)
NON HDL CHOLESTEROL: 105 MG/DL (ref 0–149)
NRBC BLD-RTO: 0 /100 WBCS (ref 0–0)
PLATELET # BLD AUTO: 204 X10*3/UL (ref 150–450)
POTASSIUM SERPL-SCNC: 3.7 MMOL/L (ref 3.5–5.3)
PROT SERPL-MCNC: 6.7 G/DL (ref 6.4–8.2)
RBC # BLD AUTO: 4.23 X10*6/UL (ref 4–5.2)
SODIUM SERPL-SCNC: 139 MMOL/L (ref 136–145)
TRIGL SERPL-MCNC: 109 MG/DL (ref 0–149)
TSH SERPL-ACNC: 0.53 MIU/L (ref 0.44–3.98)
VLDL: 22 MG/DL (ref 0–40)
WBC # BLD AUTO: 9.3 X10*3/UL (ref 4.4–11.3)

## 2024-10-01 PROCEDURE — 85027 COMPLETE CBC AUTOMATED: CPT

## 2024-10-01 PROCEDURE — 80061 LIPID PANEL: CPT

## 2024-10-01 PROCEDURE — 84443 ASSAY THYROID STIM HORMONE: CPT

## 2024-10-01 PROCEDURE — 36415 COLL VENOUS BLD VENIPUNCTURE: CPT

## 2024-10-01 PROCEDURE — 80053 COMPREHEN METABOLIC PANEL: CPT

## 2024-10-07 ENCOUNTER — APPOINTMENT (OUTPATIENT)
Dept: CARDIOLOGY | Facility: HOSPITAL | Age: 49
End: 2024-10-07
Payer: COMMERCIAL

## 2024-10-07 ENCOUNTER — OFFICE VISIT (OUTPATIENT)
Dept: UROLOGY | Facility: HOSPITAL | Age: 49
End: 2024-10-07
Payer: COMMERCIAL

## 2024-10-07 ENCOUNTER — APPOINTMENT (OUTPATIENT)
Dept: RADIOLOGY | Facility: HOSPITAL | Age: 49
End: 2024-10-07
Payer: COMMERCIAL

## 2024-10-07 ENCOUNTER — HOSPITAL ENCOUNTER (EMERGENCY)
Facility: HOSPITAL | Age: 49
Discharge: HOME | End: 2024-10-07
Attending: GENERAL PRACTICE
Payer: COMMERCIAL

## 2024-10-07 VITALS
TEMPERATURE: 98 F | HEIGHT: 64 IN | OXYGEN SATURATION: 96 % | DIASTOLIC BLOOD PRESSURE: 77 MMHG | WEIGHT: 220 LBS | SYSTOLIC BLOOD PRESSURE: 122 MMHG | BODY MASS INDEX: 37.56 KG/M2 | RESPIRATION RATE: 16 BRPM | HEART RATE: 80 BPM

## 2024-10-07 DIAGNOSIS — N20.0 KIDNEY STONE: Primary | ICD-10-CM

## 2024-10-07 DIAGNOSIS — R10.9 FLANK PAIN: Primary | ICD-10-CM

## 2024-10-07 LAB
ALBUMIN SERPL BCP-MCNC: 5.1 G/DL (ref 3.4–5)
ALP SERPL-CCNC: 58 U/L (ref 33–110)
ALT SERPL W P-5'-P-CCNC: 17 U/L (ref 7–45)
ANION GAP SERPL CALC-SCNC: 12 MMOL/L (ref 10–20)
APPEARANCE UR: CLEAR
AST SERPL W P-5'-P-CCNC: 16 U/L (ref 9–39)
BASOPHILS # BLD AUTO: 0.05 X10*3/UL (ref 0–0.1)
BASOPHILS NFR BLD AUTO: 0.5 %
BILIRUB SERPL-MCNC: 0.9 MG/DL (ref 0–1.2)
BILIRUB UR STRIP.AUTO-MCNC: NEGATIVE MG/DL
BUN SERPL-MCNC: 12 MG/DL (ref 6–23)
CALCIUM SERPL-MCNC: 9.8 MG/DL (ref 8.6–10.3)
CARDIAC TROPONIN I PNL SERPL HS: 4 NG/L (ref 0–13)
CHLORIDE SERPL-SCNC: 101 MMOL/L (ref 98–107)
CO2 SERPL-SCNC: 29 MMOL/L (ref 21–32)
COLOR UR: YELLOW
CREAT SERPL-MCNC: 0.82 MG/DL (ref 0.5–1.05)
D DIMER PPP FEU-MCNC: <215 NG/ML FEU
EGFRCR SERPLBLD CKD-EPI 2021: 88 ML/MIN/1.73M*2
EOSINOPHIL # BLD AUTO: 0.38 X10*3/UL (ref 0–0.7)
EOSINOPHIL NFR BLD AUTO: 3.8 %
ERYTHROCYTE [DISTWIDTH] IN BLOOD BY AUTOMATED COUNT: 13 % (ref 11.5–14.5)
GLUCOSE SERPL-MCNC: 94 MG/DL (ref 74–99)
GLUCOSE UR STRIP.AUTO-MCNC: NORMAL MG/DL
HCG UR QL IA.RAPID: NEGATIVE
HCT VFR BLD AUTO: 38.7 % (ref 36–46)
HGB BLD-MCNC: 13.6 G/DL (ref 12–16)
IMM GRANULOCYTES # BLD AUTO: 0.02 X10*3/UL (ref 0–0.7)
IMM GRANULOCYTES NFR BLD AUTO: 0.2 % (ref 0–0.9)
KETONES UR STRIP.AUTO-MCNC: NEGATIVE MG/DL
LACTATE SERPL-SCNC: 1.5 MMOL/L (ref 0.4–2)
LEUKOCYTE ESTERASE UR QL STRIP.AUTO: NEGATIVE
LIPASE SERPL-CCNC: 81 U/L (ref 9–82)
LYMPHOCYTES # BLD AUTO: 3.95 X10*3/UL (ref 1.2–4.8)
LYMPHOCYTES NFR BLD AUTO: 39.8 %
MCH RBC QN AUTO: 30.4 PG (ref 26–34)
MCHC RBC AUTO-ENTMCNC: 35.1 G/DL (ref 32–36)
MCV RBC AUTO: 87 FL (ref 80–100)
MONOCYTES # BLD AUTO: 0.45 X10*3/UL (ref 0.1–1)
MONOCYTES NFR BLD AUTO: 4.5 %
NEUTROPHILS # BLD AUTO: 5.08 X10*3/UL (ref 1.2–7.7)
NEUTROPHILS NFR BLD AUTO: 51.2 %
NITRITE UR QL STRIP.AUTO: NEGATIVE
NRBC BLD-RTO: 0 /100 WBCS (ref 0–0)
PH UR STRIP.AUTO: 6 [PH]
PLATELET # BLD AUTO: 305 X10*3/UL (ref 150–450)
POC APPEARANCE, URINE: CLEAR
POC BILIRUBIN, URINE: ABNORMAL
POC BLOOD, URINE: NEGATIVE
POC COLOR, URINE: YELLOW
POC GLUCOSE, URINE: NEGATIVE MG/DL
POC KETONES, URINE: ABNORMAL MG/DL
POC LEUKOCYTES, URINE: NEGATIVE
POC NITRITE,URINE: NEGATIVE
POC PH, URINE: 6 PH
POC PROTEIN, URINE: ABNORMAL MG/DL
POC SPECIFIC GRAVITY, URINE: >=1.03
POC UROBILINOGEN, URINE: 0.2 EU/DL
POTASSIUM SERPL-SCNC: 3.8 MMOL/L (ref 3.5–5.3)
PROT SERPL-MCNC: 7.1 G/DL (ref 6.4–8.2)
PROT UR STRIP.AUTO-MCNC: NEGATIVE MG/DL
RBC # BLD AUTO: 4.47 X10*6/UL (ref 4–5.2)
RBC # UR STRIP.AUTO: NEGATIVE /UL
SODIUM SERPL-SCNC: 138 MMOL/L (ref 136–145)
SP GR UR STRIP.AUTO: 1.02
UROBILINOGEN UR STRIP.AUTO-MCNC: NORMAL MG/DL
WBC # BLD AUTO: 9.9 X10*3/UL (ref 4.4–11.3)

## 2024-10-07 PROCEDURE — 81003 URINALYSIS AUTO W/O SCOPE: CPT | Performed by: UROLOGY

## 2024-10-07 PROCEDURE — 83605 ASSAY OF LACTIC ACID: CPT | Performed by: NURSE PRACTITIONER

## 2024-10-07 PROCEDURE — 83690 ASSAY OF LIPASE: CPT | Performed by: NURSE PRACTITIONER

## 2024-10-07 PROCEDURE — 85379 FIBRIN DEGRADATION QUANT: CPT

## 2024-10-07 PROCEDURE — 99285 EMERGENCY DEPT VISIT HI MDM: CPT | Mod: 25

## 2024-10-07 PROCEDURE — 2500000004 HC RX 250 GENERAL PHARMACY W/ HCPCS (ALT 636 FOR OP/ED): Performed by: NURSE PRACTITIONER

## 2024-10-07 PROCEDURE — 84484 ASSAY OF TROPONIN QUANT: CPT

## 2024-10-07 PROCEDURE — 36415 COLL VENOUS BLD VENIPUNCTURE: CPT

## 2024-10-07 PROCEDURE — 2550000001 HC RX 255 CONTRASTS: Performed by: NURSE PRACTITIONER

## 2024-10-07 PROCEDURE — 81025 URINE PREGNANCY TEST: CPT | Performed by: NURSE PRACTITIONER

## 2024-10-07 PROCEDURE — 99214 OFFICE O/P EST MOD 30 MIN: CPT | Performed by: UROLOGY

## 2024-10-07 PROCEDURE — 96374 THER/PROPH/DIAG INJ IV PUSH: CPT

## 2024-10-07 PROCEDURE — 80053 COMPREHEN METABOLIC PANEL: CPT | Performed by: NURSE PRACTITIONER

## 2024-10-07 PROCEDURE — 74177 CT ABD & PELVIS W/CONTRAST: CPT | Performed by: RADIOLOGY

## 2024-10-07 PROCEDURE — 2500000004 HC RX 250 GENERAL PHARMACY W/ HCPCS (ALT 636 FOR OP/ED)

## 2024-10-07 PROCEDURE — 36415 COLL VENOUS BLD VENIPUNCTURE: CPT | Performed by: NURSE PRACTITIONER

## 2024-10-07 PROCEDURE — 99204 OFFICE O/P NEW MOD 45 MIN: CPT | Performed by: UROLOGY

## 2024-10-07 PROCEDURE — 74177 CT ABD & PELVIS W/CONTRAST: CPT

## 2024-10-07 PROCEDURE — 85025 COMPLETE CBC W/AUTO DIFF WBC: CPT | Performed by: NURSE PRACTITIONER

## 2024-10-07 PROCEDURE — 93005 ELECTROCARDIOGRAM TRACING: CPT

## 2024-10-07 PROCEDURE — 81003 URINALYSIS AUTO W/O SCOPE: CPT | Mod: 91 | Performed by: NURSE PRACTITIONER

## 2024-10-07 RX ORDER — ACETAMINOPHEN 500 MG
1000 TABLET ORAL EVERY 8 HOURS PRN
Qty: 18 TABLET | Refills: 0 | Status: SHIPPED | OUTPATIENT
Start: 2024-10-07 | End: 2024-10-10

## 2024-10-07 RX ORDER — MORPHINE SULFATE 4 MG/ML
4 INJECTION, SOLUTION INTRAMUSCULAR; INTRAVENOUS ONCE
Status: COMPLETED | OUTPATIENT
Start: 2024-10-07 | End: 2024-10-07

## 2024-10-07 RX ORDER — ONDANSETRON HYDROCHLORIDE 2 MG/ML
4 INJECTION, SOLUTION INTRAVENOUS ONCE
Status: COMPLETED | OUTPATIENT
Start: 2024-10-07 | End: 2024-10-07

## 2024-10-07 RX ORDER — METHOCARBAMOL 500 MG/1
500 TABLET, FILM COATED ORAL 2 TIMES DAILY
Qty: 10 TABLET | Refills: 0 | Status: SHIPPED | OUTPATIENT
Start: 2024-10-07 | End: 2024-10-12

## 2024-10-07 RX ORDER — NAPROXEN 500 MG/1
500 TABLET ORAL EVERY 12 HOURS
Qty: 6 TABLET | Refills: 0 | Status: SHIPPED | OUTPATIENT
Start: 2024-10-07 | End: 2024-10-10

## 2024-10-07 RX ORDER — TAMSULOSIN HYDROCHLORIDE 0.4 MG/1
0.4 CAPSULE ORAL DAILY
Qty: 30 CAPSULE | Refills: 2 | Status: SHIPPED | OUTPATIENT
Start: 2024-10-07 | End: 2025-01-05

## 2024-10-07 RX ORDER — LIDOCAINE 50 MG/G
1 PATCH TOPICAL DAILY
Qty: 5 PATCH | Refills: 0 | Status: SHIPPED | OUTPATIENT
Start: 2024-10-07 | End: 2024-10-12

## 2024-10-07 RX ORDER — KETOROLAC TROMETHAMINE 30 MG/ML
15 INJECTION, SOLUTION INTRAMUSCULAR; INTRAVENOUS ONCE
Status: COMPLETED | OUTPATIENT
Start: 2024-10-07 | End: 2024-10-07

## 2024-10-07 ASSESSMENT — PAIN - FUNCTIONAL ASSESSMENT: PAIN_FUNCTIONAL_ASSESSMENT: 0-10

## 2024-10-07 ASSESSMENT — PAIN SCALES - GENERAL
PAINLEVEL_OUTOF10: 10 - WORST POSSIBLE PAIN

## 2024-10-07 ASSESSMENT — PAIN DESCRIPTION - LOCATION
LOCATION: ABDOMEN
LOCATION: ABDOMEN
LOCATION: BACK

## 2024-10-07 NOTE — ED TRIAGE NOTES
Pt to the ED from urologist office, has kidney stone on left side, increased pain, was sent here for pain meds so she could pass it. No difficulty urinating or dysuria reported by patient

## 2024-10-07 NOTE — PROGRESS NOTES
NPV    Referred for kidney stones     HISTORY OF PRESENT ILLNESS:   Tor Patino is a 49 y.o. female who is being seen today for kidney stones.  Pt most recently in ER on 9/8/24, found to have 3mm calculus in left mid ureter as well as 3mm lower pole calculus.  Sent home on MET.    Long history of kidney stones.  Never had procedure for stones    Drinking 120oz water per day.      Feels like she is passing another stone.  Feels nauseated.      PAST MEDICAL HISTORY:  No past medical history on file.    PAST SURGICAL HISTORY:  No past surgical history on file.     ALLERGIES:   Allergies   Allergen Reactions    Bee Pollen Unknown     Other reaction(s): Note:, Other: See Comments   hayfever like symptoms pt states, noise running, eyes watering. (only with new cut grass)     MEDICATIONS:   Current Outpatient Medications   Medication Instructions    oxyCODONE (ROXICODONE) 5 mg, oral, Every 6 hours PRN      PHYSICAL EXAM:  Constitutional: Patient appears well-developed and well-nourished. No distress.    Pulmonary/Chest: Effort normal. No respiratory distress.   Musculoskeletal: Normal range of motion.    Neurological: Alert and oriented to person, place, and time.  Psychiatric: Normal mood and affect. Behavior is normal. Thought content normal.      Labs    Lab Results   Component Value Date    CREATININE 0.85 10/01/2024     Lab Results   Component Value Date    CHOL 155 10/01/2024     Lab Results   Component Value Date    HDL 50.1 10/01/2024     Lab Results   Component Value Date    CHHDL 3.1 10/01/2024     Lab Results   Component Value Date    VLDL 22 10/01/2024     Lab Results   Component Value Date    TRIG 109 10/01/2024     Lab Results   Component Value Date    HCT 38.5 10/01/2024       Imaging  INDICATION:  Signs/Symptoms:Diffuse abdominal pain, bilateral flank pain, recent  history of renal calculi with hydronephrosis.          COMPARISON:  CTA of the chest, abdomen, and pelvis 08/26/2024.      ACCESSION  NUMBER(S):  [id number]      ORDERING CLINICIAN:  TANYA ALARCON      TECHNIQUE:  Contiguous axial images of the abdomen and pelvis were obtained after  the intravenous administration of 75 mL Omnipaque 350 contrast.  Coronal and sagittal reformatted images were reconstructed from the  axial data.      FINDINGS:  LOWER CHEST: No acute abnormality.      LIVER: No significant parenchymal abnormality.      BILE DUCTS: No significant intrahepatic or extrahepatic dilatation.      GALLBLADDER: Cholecystectomy.      PANCREAS: No significant abnormality.      SPLEEN: No significant abnormality.      ADRENALS: No significant abnormality.      KIDNEYS, URETERS, BLADDER: Kidneys enhance symmetrically. 3 mm  nonobstructing left lower pole calculus. 3 mm calculus in the mid  left ureter. No hydronephrosis or hydroureter. Mild bladder wall  thickening in the setting of underdistention.      REPRODUCTIVE ORGANS: Hysterectomy.      GI: No obstruction. No bowel wall thickening or adjacent inflammatory  change. Normal appendix.      VESSELS: No significant abnormality. The portal veins and IVC are  patent.      PERITONEUM/RETROPERITONEUM: No ascites, free air, or fluid collection.      LYMPH NODES: No enlarged lymph nodes.      ABDOMINAL WALL: No significant abnormality.      OSSEOUS STRUCTURES: No acute osseous abnormality.      IMPRESSION:  1. 3 mm calculus in the left mid ureter without hydronephrosis or  hydroureter.  2. Nonobstructing 3 mm left lower pole calculus.  3. Status post hysterectomy and cholecystectomy.          Assessment:      1. Kidney stone          Tor Patino is a 49 y.o. female here for NPV for recurrent kidney stones    Most recent imaging reviewed, only small stones.       Plan:   1)  Hydrate well  2) JAXSON and KUB  3) Refer to nephrology for medical management

## 2024-10-07 NOTE — ED TRIAGE NOTES
Secondary to patient volumes and overcrowding, I performed a brief medical screening exam of the patient in triage, as the patient awaits space in the main ED.    History of Present Illness:  Tor Patino presents with   Chief Complaint   Patient presents with    Flank Pain       Physical Exam:  General - In no acute distress  Respiratory - Breathing comfortably  Cardiac - Normal S1, S2, no m/g/r  Neurologic - Moving all extremities  Abdomen -bowel sounds present, Left CVAT, nontender    Medical Decision Making:  Patient will require further evaluation in the main ED.    Initial diagnostic tests were ordered from triage.      The patient demonstrates understanding that this initial evaluation is a brief medical screening exam and the expectation is that they await for space in the main ED to be further evaluated.  The patient understands that, if they leave prior to further evaluation in the main ED after this initial evaluation in triage, they are doing so under their own accord knowing that their evaluation/work-up is not yet complete. The patient also understands that any preliminary diagnostic results, including abnormalities, may not be shared with them, if they choose to leave prior to further evaluation in the main ED.

## 2024-10-08 LAB
ATRIAL RATE: 75 BPM
HOLD SPECIMEN: NORMAL
P AXIS: 58 DEGREES
P OFFSET: 193 MS
P ONSET: 130 MS
PR INTERVAL: 182 MS
Q ONSET: 221 MS
QRS COUNT: 13 BEATS
QRS DURATION: 92 MS
QT INTERVAL: 390 MS
QTC CALCULATION(BAZETT): 435 MS
QTC FREDERICIA: 419 MS
R AXIS: -4 DEGREES
T AXIS: 49 DEGREES
T OFFSET: 416 MS
VENTRICULAR RATE: 75 BPM

## 2024-10-08 NOTE — DISCHARGE INSTRUCTIONS
Please return to the ED immediately if you have any new or worsening signs or was  Please follow-up with your primary care doctor within 3 days

## 2024-10-08 NOTE — ED PROVIDER NOTES
HPI   Chief Complaint   Patient presents with    Flank Pain       49-year-old female past medical history of stones and PE not anticoagulated presents the ED today with a chief concern of flank pain.  Patient reports that her PE was provoked by surgery and she was only anticoagulated for about 6 months.  She reports that this morning she woke up to stabbing and aching pain located in the left flank radiating into the groin.  Reports hematuria and 3 episodes of nonbloody nonbilious vomiting.  No fevers.  Reports left lower quadrant abdominal pain.  History of cholecystectomy and appendectomy.  She does not feel that she is having another PE.  Is concerned she has a kidney stone.  Denies saddle anesthesia or urinary/fecal incontinence or retention.  She has no other symptoms or concern at this time.      History provided by:  Patient   used: No            Patient History   History reviewed. No pertinent past medical history.  History reviewed. No pertinent surgical history.  No family history on file.  Social History     Tobacco Use    Smoking status: Not on file    Smokeless tobacco: Not on file   Substance Use Topics    Alcohol use: Not on file    Drug use: Not on file       Physical Exam   ED Triage Vitals   Temperature Heart Rate Respirations BP   10/07/24 1715 10/07/24 1715 10/07/24 1715 10/07/24 1715   36.5 °C (97.7 °F) 77 14 116/65      Pulse Ox Temp Source Heart Rate Source Patient Position   10/07/24 1715 10/07/24 1715 10/07/24 1920 --   98 % Temporal Monitor       BP Location FiO2 (%)     10/07/24 1920 --     Right arm        Physical Exam  Constitutional: Vital signs per nursing notes.  Well developed, well nourished.  Appears uncomfortable.  Walking around the room.  Eyes: PERRL; conjunctivae and lids normal  ENT: Ears normal externally; face symmetric. voice normal  Neck: neck supple, no meningismus; trachea midline without deviation  Respiratory: normal respiratory effort and  excursion; no rales, rhonchi, or wheezes; equal air entry  Cardiovascular: RRR, 2+ pulses extremities   Neurological: normal speech; CN II-XII grossly intact, normal motor and sensory function  GI: no distention, soft.  Tenderness in left lower quadrant and left CVA.  No rebound tenderness or guarding.  No palpable masses.  No pulsatile masses.  : Deferred  Musculoskeletal: normal gait and station; normal digits and nails; normal to palpation; normal strength/tone; neurovascular status intact.  Skin: normal to inspection; normal to palpation; no rash    ED Course & MDM   ED Course as of 10/07/24 2306   Mon Oct 07, 2024   2112 IMPRESSION:  1. No acute abnormality within the abdomen or pelvis.  2. Nonobstructing 2 mm left renal calculus.  3. Mild hepatic steatosis.  4. Cholecystectomy and postsurgical changes suggestive of prior  appendectomy.      MACRO:  None      Signed by: Dutch Del Valle 10/7/2024 9:09 PM  Dictation workstation:   [id number]               []   2112 CBC shows no evidence of leukocytosis or anemia.  CMP shows no EZE or acute liver injury.  Urinalysis, hCG, lactate, lipase normal [MC]   2216 D dimer normal [MC]   2216 Troponin normal [MC]   2219 Ventricular rate 75 bpm. [MC]   2220 Ventricular rate 75 bpm.  OK interval 182 ms.  QRS duration 92 ms.  QT/QTc 390/435 ms.  Patient is in normal sinus rhythm at a ventricular rate of 75 bpm.  Left axis deviation.  There is good R wave progression.  No right or left bundle-branch block.  No ST elevations.  Compared previous EKG grossly unchanged [MC]      ED Course User Index  [MC] Anatoliy Hooper PA-C         Diagnoses as of 10/07/24 2306   Flank pain                 No data recorded     [place] Coma Scale Score: 15 (10/07/24 1715 : Cheyenne Nair RN)                           Medical Decision Making  49-year-old female past medical history of kidney stones and provoked PE not anticoagulated presents to the ED today with a chief concern of flank pain.   Vital signs reassuring.  Patient overall appears well and is nontoxic-appearing.  Patient was initially evaluated by provider in triage.  When I evaluated the patient she had already been given Toradol, morphine, and Zofran and was still in a significant amount of pain.  Appears to be uncomfortable walking around the room grasping her left flank.  I gave her some Dilaudid in the ED and she felt better after this.  Was requesting to leave.  Her labs are overall reassuring. CBC shows no evidence of leukocytosis or anemia.  CMP shows no EZE or acute liver injury.  Urinalysis normal.  Troponin, lipase, lactate normal.  Urine hCG negative.  D-dimer is normal.  Her CT abdomen pelvis with IV contrast shows 2 mm nonobstructing left renal calculus.  Small fat-containing left inguinal hernia.  Hepatic steatosis.  No acute abnormalities.  EKG shows no ischemic changes.  2+ symmetric pulses throughout.  Not concern for aortic dissection at this time.  Is neuro intact.  No zoster rash.  No signs of cord compression.  No emergent MRI indicated at this time.  On reevaluation patient is requesting to leave.  Not concern for any mesenteric ischemia at this time.  Will treat outpatient with analgesics and muscle relaxants.  She has a ride home today will not be driving her self home.  With normal lactate low suspicion for any mesenteric ischemia.  Offered admission patient declined.  Discussed my impression and findings with patient she feels comfortable returning home.  We discussed very strict precautions include returning for any new or worsening signs home.  Patient is in agreement this plan.  She will follow-up with her PCP within 3 days.  Again discussed strict return precautions.  The patient was also seen and evaluated by attending physician.    Differential diagnosis: Nephrolithiasis, ureterolithiasis, PE, aortic dissection, tamponade, pneumothorax, pneumonia, bowel perforation, other acute intra-abdominal  pathology    Disposition/treatment  1.  See above    Shared decision-making was used patient feels comfortable returning home     Patient was advised to follow up with recommended provider in 1 day1 for another evaluation and exam. I advised patient/guardian to return or go to closest emergency room immediately if symptoms change, get worse, new symptoms develop prior to follow up. If there is no improvement in symptoms in the next 24 hours they are advised to return for further evaluation and exam. I also explained the plan and treatment course. Patient/guardian is in agreement with plan, treatment course, and follow up and states verbally that they will comply.    Homegoing. I discussed the differential; results and discharge plan with the patient and/or family/friend/caregiver if present.  I emphasized the importance of follow-up with the physician I referred them to in the timeframe recommended.  I explained reasons for the patient to return to the Emergency Department. They agreed that if they feel their condition is worsening or if they have any other concern they should call 911 immediately for further assistance. I gave the patient an opportunity to ask all questions they had and answered all of them accordingly. They understand return precautions and discharge instructions. The patient and/or family/friend/caregiver expressed understanding verbally and that they would comply.        This note has been transcribed using voice recognition and may contain grammatical errors, misplaced words, incorrect words, incorrect phrases or other errors.        Procedure  Procedures     Anatoliy Hooper PA-C  10/07/24 2308       Anatoliy Hooper PA-C  10/07/24 2308       Anatoliy Hooper PA-C  10/08/24 0872

## 2024-10-19 ENCOUNTER — APPOINTMENT (OUTPATIENT)
Dept: RADIOLOGY | Facility: HOSPITAL | Age: 49
End: 2024-10-19
Payer: COMMERCIAL

## 2024-10-22 ENCOUNTER — HOSPITAL ENCOUNTER (OUTPATIENT)
Dept: RADIOLOGY | Facility: HOSPITAL | Age: 49
Discharge: HOME | End: 2024-10-22
Payer: COMMERCIAL

## 2024-10-22 DIAGNOSIS — N20.0 KIDNEY STONE: ICD-10-CM

## 2024-10-22 PROCEDURE — 76770 US EXAM ABDO BACK WALL COMP: CPT | Performed by: RADIOLOGY

## 2024-10-22 PROCEDURE — 76770 US EXAM ABDO BACK WALL COMP: CPT

## 2024-11-15 ENCOUNTER — APPOINTMENT (OUTPATIENT)
Dept: RADIOLOGY | Facility: HOSPITAL | Age: 49
End: 2024-11-15
Payer: COMMERCIAL

## 2024-11-15 ENCOUNTER — HOSPITAL ENCOUNTER (EMERGENCY)
Facility: HOSPITAL | Age: 49
Discharge: HOME | End: 2024-11-15
Attending: EMERGENCY MEDICINE
Payer: COMMERCIAL

## 2024-11-15 VITALS
OXYGEN SATURATION: 96 % | WEIGHT: 230 LBS | SYSTOLIC BLOOD PRESSURE: 116 MMHG | HEART RATE: 77 BPM | BODY MASS INDEX: 39.27 KG/M2 | TEMPERATURE: 97 F | DIASTOLIC BLOOD PRESSURE: 73 MMHG | HEIGHT: 64 IN | RESPIRATION RATE: 16 BRPM

## 2024-11-15 DIAGNOSIS — N20.0 LEFT NEPHROLITHIASIS: ICD-10-CM

## 2024-11-15 DIAGNOSIS — N20.0 KIDNEY STONE: Primary | ICD-10-CM

## 2024-11-15 LAB
ALBUMIN SERPL BCP-MCNC: 4.6 G/DL (ref 3.4–5)
ALP SERPL-CCNC: 48 U/L (ref 33–110)
ALT SERPL W P-5'-P-CCNC: 23 U/L (ref 7–45)
ANION GAP SERPL CALC-SCNC: 14 MMOL/L (ref 10–20)
APPEARANCE UR: ABNORMAL
AST SERPL W P-5'-P-CCNC: 21 U/L (ref 9–39)
BASOPHILS # BLD AUTO: 0.06 X10*3/UL (ref 0–0.1)
BASOPHILS NFR BLD AUTO: 0.7 %
BILIRUB SERPL-MCNC: 0.7 MG/DL (ref 0–1.2)
BILIRUB UR STRIP.AUTO-MCNC: NEGATIVE MG/DL
BUN SERPL-MCNC: 10 MG/DL (ref 6–23)
CALCIUM SERPL-MCNC: 9.1 MG/DL (ref 8.6–10.3)
CHLORIDE SERPL-SCNC: 106 MMOL/L (ref 98–107)
CO2 SERPL-SCNC: 23 MMOL/L (ref 21–32)
COLOR UR: ABNORMAL
CREAT SERPL-MCNC: 0.69 MG/DL (ref 0.5–1.05)
EGFRCR SERPLBLD CKD-EPI 2021: >90 ML/MIN/1.73M*2
EOSINOPHIL # BLD AUTO: 0.33 X10*3/UL (ref 0–0.7)
EOSINOPHIL NFR BLD AUTO: 3.7 %
ERYTHROCYTE [DISTWIDTH] IN BLOOD BY AUTOMATED COUNT: 13.7 % (ref 11.5–14.5)
GLUCOSE SERPL-MCNC: 94 MG/DL (ref 74–99)
GLUCOSE UR STRIP.AUTO-MCNC: NORMAL MG/DL
HCT VFR BLD AUTO: 36.7 % (ref 36–46)
HGB BLD-MCNC: 12.6 G/DL (ref 12–16)
IMM GRANULOCYTES # BLD AUTO: 0.03 X10*3/UL (ref 0–0.7)
IMM GRANULOCYTES NFR BLD AUTO: 0.3 % (ref 0–0.9)
KETONES UR STRIP.AUTO-MCNC: NEGATIVE MG/DL
LEUKOCYTE ESTERASE UR QL STRIP.AUTO: NEGATIVE
LIPASE SERPL-CCNC: 82 U/L (ref 9–82)
LYMPHOCYTES # BLD AUTO: 3.86 X10*3/UL (ref 1.2–4.8)
LYMPHOCYTES NFR BLD AUTO: 43.8 %
MCH RBC QN AUTO: 29.9 PG (ref 26–34)
MCHC RBC AUTO-ENTMCNC: 34.3 G/DL (ref 32–36)
MCV RBC AUTO: 87 FL (ref 80–100)
MONOCYTES # BLD AUTO: 0.46 X10*3/UL (ref 0.1–1)
MONOCYTES NFR BLD AUTO: 5.2 %
NEUTROPHILS # BLD AUTO: 4.07 X10*3/UL (ref 1.2–7.7)
NEUTROPHILS NFR BLD AUTO: 46.3 %
NITRITE UR QL STRIP.AUTO: NEGATIVE
NRBC BLD-RTO: 0 /100 WBCS (ref 0–0)
PH UR STRIP.AUTO: 7.5 [PH]
PLATELET # BLD AUTO: 290 X10*3/UL (ref 150–450)
POTASSIUM SERPL-SCNC: 4 MMOL/L (ref 3.5–5.3)
PROT SERPL-MCNC: 7.1 G/DL (ref 6.4–8.2)
PROT UR STRIP.AUTO-MCNC: NEGATIVE MG/DL
RBC # BLD AUTO: 4.22 X10*6/UL (ref 4–5.2)
RBC # UR STRIP.AUTO: NEGATIVE /UL
SODIUM SERPL-SCNC: 139 MMOL/L (ref 136–145)
SP GR UR STRIP.AUTO: 1.01
UROBILINOGEN UR STRIP.AUTO-MCNC: NORMAL MG/DL
WBC # BLD AUTO: 8.8 X10*3/UL (ref 4.4–11.3)

## 2024-11-15 PROCEDURE — 84075 ASSAY ALKALINE PHOSPHATASE: CPT

## 2024-11-15 PROCEDURE — 81003 URINALYSIS AUTO W/O SCOPE: CPT | Performed by: EMERGENCY MEDICINE

## 2024-11-15 PROCEDURE — 74177 CT ABD & PELVIS W/CONTRAST: CPT | Performed by: RADIOLOGY

## 2024-11-15 PROCEDURE — 74177 CT ABD & PELVIS W/CONTRAST: CPT

## 2024-11-15 PROCEDURE — 2500000001 HC RX 250 WO HCPCS SELF ADMINISTERED DRUGS (ALT 637 FOR MEDICARE OP)

## 2024-11-15 PROCEDURE — 96375 TX/PRO/DX INJ NEW DRUG ADDON: CPT

## 2024-11-15 PROCEDURE — 2500000004 HC RX 250 GENERAL PHARMACY W/ HCPCS (ALT 636 FOR OP/ED)

## 2024-11-15 PROCEDURE — 2550000001 HC RX 255 CONTRASTS: Performed by: EMERGENCY MEDICINE

## 2024-11-15 PROCEDURE — 83690 ASSAY OF LIPASE: CPT

## 2024-11-15 PROCEDURE — 99285 EMERGENCY DEPT VISIT HI MDM: CPT | Performed by: EMERGENCY MEDICINE

## 2024-11-15 PROCEDURE — 96374 THER/PROPH/DIAG INJ IV PUSH: CPT | Mod: 59

## 2024-11-15 PROCEDURE — 36415 COLL VENOUS BLD VENIPUNCTURE: CPT

## 2024-11-15 PROCEDURE — 85025 COMPLETE CBC W/AUTO DIFF WBC: CPT

## 2024-11-15 PROCEDURE — 99284 EMERGENCY DEPT VISIT MOD MDM: CPT | Mod: 25

## 2024-11-15 RX ORDER — ONDANSETRON HYDROCHLORIDE 2 MG/ML
4 INJECTION, SOLUTION INTRAVENOUS ONCE
Status: COMPLETED | OUTPATIENT
Start: 2024-11-15 | End: 2024-11-15

## 2024-11-15 RX ORDER — MORPHINE SULFATE 4 MG/ML
4 INJECTION, SOLUTION INTRAMUSCULAR; INTRAVENOUS ONCE
Status: COMPLETED | OUTPATIENT
Start: 2024-11-15 | End: 2024-11-15

## 2024-11-15 RX ORDER — OXYCODONE HYDROCHLORIDE 5 MG/1
5 TABLET ORAL ONCE
Status: COMPLETED | OUTPATIENT
Start: 2024-11-15 | End: 2024-11-15

## 2024-11-15 RX ORDER — OXYCODONE HYDROCHLORIDE 5 MG/1
5 TABLET ORAL EVERY 6 HOURS PRN
Qty: 15 TABLET | Refills: 0 | Status: SHIPPED | OUTPATIENT
Start: 2024-11-15 | End: 2024-11-20

## 2024-11-15 ASSESSMENT — PAIN SCALES - GENERAL
PAINLEVEL_OUTOF10: 10 - WORST POSSIBLE PAIN
PAINLEVEL_OUTOF10: 9
PAINLEVEL_OUTOF10: 8
PAINLEVEL_OUTOF10: 7

## 2024-11-15 ASSESSMENT — PAIN DESCRIPTION - LOCATION
LOCATION: BACK
LOCATION: BACK
LOCATION: ABDOMEN

## 2024-11-15 ASSESSMENT — PAIN DESCRIPTION - ORIENTATION: ORIENTATION: LEFT

## 2024-11-15 ASSESSMENT — PAIN DESCRIPTION - DESCRIPTORS: DESCRIPTORS: ACHING

## 2024-11-15 ASSESSMENT — PAIN DESCRIPTION - FREQUENCY: FREQUENCY: CONSTANT/CONTINUOUS

## 2024-11-15 ASSESSMENT — PAIN DESCRIPTION - PAIN TYPE
TYPE: ACUTE PAIN
TYPE: ACUTE PAIN

## 2024-11-15 ASSESSMENT — PAIN DESCRIPTION - ONSET: ONSET: ONGOING

## 2024-11-15 ASSESSMENT — LIFESTYLE VARIABLES
EVER HAD A DRINK FIRST THING IN THE MORNING TO STEADY YOUR NERVES TO GET RID OF A HANGOVER: NO
TOTAL SCORE: 0
HAVE YOU EVER FELT YOU SHOULD CUT DOWN ON YOUR DRINKING: NO
EVER FELT BAD OR GUILTY ABOUT YOUR DRINKING: NO
HAVE PEOPLE ANNOYED YOU BY CRITICIZING YOUR DRINKING: NO

## 2024-11-15 ASSESSMENT — PAIN - FUNCTIONAL ASSESSMENT: PAIN_FUNCTIONAL_ASSESSMENT: 0-10

## 2024-11-15 ASSESSMENT — PAIN DESCRIPTION - PROGRESSION: CLINICAL_PROGRESSION: NOT CHANGED

## 2024-11-16 LAB — HOLD SPECIMEN: NORMAL

## 2024-11-16 NOTE — DISCHARGE INSTRUCTIONS
Please follow up with your primary care provider within 7 days for hospital follow up. Please call to make this appointment.  Please follow-up with your urologist and nephrologist within 7 days for hospital follow-up.  Please call to make this appointment.  Please return to the ED if you experience worsening flank pain, hematuria, nausea, vomiting or any other concerning symptoms.      Please take your medications as prescribed.     If you have any new or worsening symptoms seek medical attention.    Thank you for allowing us to participate in your care!    -[facility].

## 2024-11-16 NOTE — ED PROVIDER NOTES
History of Present Illness     History provided by: Patient  Limitations to History: None  External Records Reviewed with Brief Summary:  Progress note    HPI:  Tor Patino is a 49 y.o. female patient with previous medical history of kidney stones, pyelonephritis, mild A1c history of PE/DVT, prior hysterectomy, hyperlipidemia presenting to the [facility] ED with a chief complaint of left flank pain.  Patient states the left flank pain started this morning, rated it 10/10 and radiates to the left groin.  Patient endorses nausea and 4 episodes of nonbloody nonbilious vomiting.  Otherwise she denies dysuria, hematuria, abdominal pain, chest pain, shortness of breath, cough, headache, blurring of vision or leg swelling.    Physical Exam   Triage vitals:  T 36.5 °C (97.7 °F)  HR 87  /82  RR 18  O2 96 % None (Room air)    Physical Exam  Vitals and nursing note reviewed.   Constitutional:       General: She is not in acute distress.     Appearance: She is well-developed.   HENT:      Head: Normocephalic and atraumatic.   Eyes:      Conjunctiva/sclera: Conjunctivae normal.   Cardiovascular:      Rate and Rhythm: Normal rate and regular rhythm.      Heart sounds: No murmur heard.  Pulmonary:      Effort: Pulmonary effort is normal. No respiratory distress.      Breath sounds: Normal breath sounds.   Abdominal:      Palpations: Abdomen is soft.      Tenderness: There is no abdominal tenderness.   Musculoskeletal:         General: No swelling.      Cervical back: Neck supple.   Skin:     General: Skin is warm and dry.      Capillary Refill: Capillary refill takes less than 2 seconds.   Neurological:      Mental Status: She is alert.   Psychiatric:         Mood and Affect: Mood normal.          Medical Decision Making & ED Course   Medical Decision Makin y.o. female patient with previous medical history of kidney stones, pyelonephritis, mild A1c history of PE/DVT, prior hysterectomy, hyperlipidemia presenting to  the [facility] ED with a chief complaint of left flank pain.  Patient states the left flank pain started this morning, rated it 10/10 and radiates to the left groin.  Patient endorses nausea and 4 episodes of nonbloody nonbilious vomiting.    Patient is hemodynamically stable with SpO2 of 99% on RA.  No positive pertinent findings on physical examination.    CBC wnl. CMP wnl. Lipase 82. UA negative for UTI.  CT abdomen pelvis with W IV contrast- 3 mm nonobstructive left renal calculus.  No hydronephrosis.No significant free abdominal or pelvic fluid.    I discussed the differential, results and discharge plan with the patient. I emphasized the importance of follow-up with her PCP, urologist and nephrologist. I explained reasons for the patient to return to the emergency department. Questions were addressed. They understand return precautions and discharge instructions. The patient  expressed understanding.    Oxycodone for 5 days ordered.    Patient discharged in stable condition.          ----      Differential diagnoses considered include but are not limited to: Nonobstructing left kidney stone.     Social Determinants of Health which Significantly Impact Care: None identified     EKG Independent Interpretation: EKG not obtained    Independent Result Review and Interpretation: Relevant laboratory and radiographic results were reviewed and independently interpreted by myself.  As necessary, they are commented on in the ED Course.    Chronic conditions affecting the patient's care: As documented above in [facility]    The patient was discussed with the following consultants/services: None    Care Considerations: As documented above in [facility]    ED Course:  ED Course as of 11/15/24 2119   Fri Nov 15, 2024   2104 CBC and Auto Differential  CBC wnl. [ZK]   2104 Lipase  Lipase 82. [ZK]   2105 Comprehensive metabolic panel  CMP wnl. [ZK]   2106 Urinalysis with Reflex Microscopic(!)  UA negative for UTI. [ZK]      ED Course User  Index  [ZK] Flynn Jean MD     Disposition   Discharged    Procedures   Procedures    Patient seen and discussed with ED attending physician.    Flynn Jean MD  Emergency Medicine       Flynn Jean MD  Resident  11/15/24 7551

## 2024-11-26 ENCOUNTER — APPOINTMENT (OUTPATIENT)
Dept: OBSTETRICS AND GYNECOLOGY | Facility: CLINIC | Age: 49
End: 2024-11-26
Payer: COMMERCIAL

## 2024-11-26 VITALS
WEIGHT: 235 LBS | BODY MASS INDEX: 40.12 KG/M2 | SYSTOLIC BLOOD PRESSURE: 118 MMHG | DIASTOLIC BLOOD PRESSURE: 78 MMHG | HEIGHT: 64 IN

## 2024-11-26 DIAGNOSIS — Z12.39 ENCOUNTER FOR SCREENING FOR MALIGNANT NEOPLASM OF BREAST, UNSPECIFIED SCREENING MODALITY: ICD-10-CM

## 2024-11-26 DIAGNOSIS — N95.2 VAGINAL ATROPHY: ICD-10-CM

## 2024-11-26 DIAGNOSIS — N89.8 VAGINAL ODOR: Primary | ICD-10-CM

## 2024-11-26 PROCEDURE — 3008F BODY MASS INDEX DOCD: CPT | Performed by: OBSTETRICS & GYNECOLOGY

## 2024-11-26 PROCEDURE — 1036F TOBACCO NON-USER: CPT | Performed by: OBSTETRICS & GYNECOLOGY

## 2024-11-26 PROCEDURE — 99386 PREV VISIT NEW AGE 40-64: CPT | Performed by: OBSTETRICS & GYNECOLOGY

## 2024-11-26 PROCEDURE — 87205 SMEAR GRAM STAIN: CPT

## 2024-11-26 RX ORDER — ESTRADIOL 10 UG/1
10 INSERT VAGINAL NIGHTLY
Qty: 18 TABLET | Refills: 3 | Status: SHIPPED | OUTPATIENT
Start: 2024-11-26

## 2024-11-26 ASSESSMENT — ENCOUNTER SYMPTOMS
CHILLS: 0
VOMITING: 0
DYSURIA: 0
COUGH: 0
FEVER: 0
FREQUENCY: 0
HEMATURIA: 0
DIARRHEA: 0
NAUSEA: 0
DIZZINESS: 0
CONSTIPATION: 0
WEAKNESS: 0
PALPITATIONS: 0
HEADACHES: 0
ABDOMINAL PAIN: 0
SHORTNESS OF BREATH: 0

## 2024-11-26 ASSESSMENT — PAIN SCALES - GENERAL: PAINLEVEL_OUTOF10: 0-NO PAIN

## 2024-11-26 NOTE — PROGRESS NOTES
Well Woman Visit    SUBJECTIVE  49 y.o.  female presents for well woman exam     OB/GYN History  OB History    Para Term  AB Living   3 1   1 2 1   SAB IAB Ectopic Multiple Live Births   1 1     1      # Outcome Date GA Lbr Justin/2nd Weight Sex Type Anes PTL Lv   3 SAB            2 IAB            1      M Vag-Spont   SHELTON       Menstrual status: Hysterectomy  No LMP recorded. Patient has had a hysterectomy.  Abnormal bleeding: No  Discharge: Yes has odor  Pelvic pain: No  Pelvic prolapse: No  Urinary/fecal incontinence or overactive bladder: No  Breast concerns: No    Vasomotor symptoms: Yes - hot flashes at night  Genitourinary symptoms: Yes - having vaginal dryness since hysterectomy  Sleep concerns: Sleeps well, does have   Mood concerns: No  Hormone therapy: No    Social History     Substance and Sexual Activity   Sexual Activity Not on file     Sexually transmitted infections:no past history  History of abnormal pap: No  Sexual concerns: No    Other: None     The following portions of the chart were reviewed this encounter and updated as appropriate:    Tobacco  Allergies  Meds  Problems  Med Hx  Surg Hx  Fam Hx          Screenings  Social Drivers of Health     Tobacco Use: Medium Risk (2024)    Patient History     Smoking Tobacco Use: Former     Smokeless Tobacco Use: Never     Passive Exposure: Not on file   Alcohol Use: Not on file   Financial Resource Strain: Not on File (2019)    Received from [facility]    Financial Resource Strain     Financial Resource Strain: 0   Food Insecurity: Not on File (2019)    Received from [facility]    Food Insecurity     Food: 0   Transportation Needs: Not on File (2019)    Received from [facility]    Transportation Needs     Transportation: 0   Physical Activity: Not on File (2019)    Received from [facility]    Physical Activity     Physical Activity: 0   Stress: Not on File (2019)    Received from  "[facility]    Stress     Stress: 0   Social Connections: Not on File (2019)    Received from [facility]    Social Connections     Social Connections and Isolation: 0   Intimate Partner Violence: Not on file   Depression: Not at risk (2024)    Received from [facility]    PHQ-2     PHQ-2 score: 0   Housing Stability: Not on File (2019)    Received from [facility]    Housing Stability     Housin   Utilities: Not on file   Digital Equity: Not on file   Health Literacy: Not on file         Hereditary Cancer Risk Assessment:   Family history of breast, ovarian, uterine, colon, pancreatic, and/or prostate cancer:  Family History   Problem Relation Name Age of Onset    Breast cancer Maternal Grandmother      Colon cancer Neg Hx      Ovarian cancer Neg Hx      Uterine cancer Neg Hx          Review of Systems  Review of Systems   Constitutional:  Negative for chills and fever.   Eyes:  Negative for visual disturbance.   Respiratory:  Negative for cough and shortness of breath.    Cardiovascular:  Negative for chest pain and palpitations.   Gastrointestinal:  Negative for abdominal pain, constipation, diarrhea, nausea and vomiting.   Genitourinary:  Negative for dyspareunia, dysuria, frequency, hematuria, urgency, vaginal bleeding and vaginal discharge.        Vaginal dryness   Neurological:  Negative for dizziness, weakness and headaches.         OBJECTIVE  Vitals:    24 1524   BP: 118/78   Weight: 107 kg (235 lb)   Height: 1.626 m (5' 4\")     Body mass index is 40.34 kg/m².      Physical Exam  Constitutional:       General: She is not in acute distress.     Appearance: Normal appearance.   Genitourinary:      Vulva and rectum normal.      Right Labia: No skin changes.     Left Labia: No skin changes.     Vaginal cuff intact.     Perineal sutures intact.     No vaginal discharge.      Mild vaginal atrophy present.       Right Adnexa: not tender and no mass present.     Left Adnexa: not " tender and no mass present.  Breasts:     Breasts are symmetrical.      Right: Normal.      Left: Normal.   HENT:      Head: Normocephalic and atraumatic.      Nose: Nose normal.      Mouth/Throat:      Mouth: Mucous membranes are moist.      Pharynx: Oropharynx is clear.   Eyes:      Extraocular Movements: Extraocular movements intact.      Conjunctiva/sclera: Conjunctivae normal.      Pupils: Pupils are equal, round, and reactive to light.   Cardiovascular:      Rate and Rhythm: Normal rate.      Pulses: Normal pulses.   Pulmonary:      Effort: Pulmonary effort is normal.   Abdominal:      General: Abdomen is flat. There is no distension.      Palpations: Abdomen is soft.      Tenderness: There is no abdominal tenderness. There is no guarding or rebound.   Musculoskeletal:         General: Normal range of motion.   Neurological:      General: No focal deficit present.      Mental Status: She is alert and oriented to person, place, and time.   Skin:     General: Skin is warm and dry.   Psychiatric:         Mood and Affect: Mood normal.         Behavior: Behavior normal.   Vitals reviewed.           Last pap:  status post hysterectomy  Last mammogram: approximate date 2023 and was normal  Last DEXA: not indicated   Last colonoscopy: normal     Immunization History   Administered Date(s) Administered    Pfizer COVID-19 vaccine, 12 years and older, (30mcg/0.3mL) (Comirnaty) 11/16/2024    Pfizer Gray Cap SARS-CoV-2 07/29/2022    Pfizer Purple Cap SARS-CoV-2 03/18/2021, 04/08/2021         ASSESSMENT & PLAN  -Well woman screenings performed today  -Reviewed cervical, breast, and colon cancer screening recommendations  -Discussed menopause expectations - reviewed option of Veozah as a non-hormonal medications for hot flashes  -Encouraged routine wellness exams with PCP Milind Montilla MD     -Issues identified and addressed today:   Problem List Items Addressed This Visit    None  Visit Diagnoses       Vaginal odor     -  Primary    Relevant Orders    Vaginitis Gram Stain For Bacterial Vaginosis + Yeast    Vaginal atrophy        Relevant Medications    estradiol (Vagifem) 10 mcg tablet vaginal tablet    Encounter for screening for malignant neoplasm of breast, unspecified screening modality        Relevant Orders    BI mammo bilateral screening tomosynthesis          Follow up 1 year, sooner if needed    Edna Yun MD  Obstetrics & Gynecology  11/26/24

## 2024-11-27 LAB
CLUE CELLS VAG LPF-#/AREA: NORMAL /[LPF]
NUGENT SCORE: 1
YEAST VAG WET PREP-#/AREA: NORMAL

## 2024-12-04 ENCOUNTER — APPOINTMENT (OUTPATIENT)
Dept: NEPHROLOGY | Facility: CLINIC | Age: 49
End: 2024-12-04
Payer: COMMERCIAL

## 2024-12-04 ENCOUNTER — LAB (OUTPATIENT)
Dept: LAB | Facility: LAB | Age: 49
End: 2024-12-04
Payer: COMMERCIAL

## 2024-12-04 VITALS
HEART RATE: 89 BPM | SYSTOLIC BLOOD PRESSURE: 127 MMHG | DIASTOLIC BLOOD PRESSURE: 85 MMHG | BODY MASS INDEX: 40.19 KG/M2 | HEIGHT: 64 IN | WEIGHT: 235.4 LBS

## 2024-12-04 DIAGNOSIS — N20.0 KIDNEY STONE: ICD-10-CM

## 2024-12-04 LAB
PTH-INTACT SERPL-MCNC: 44.5 PG/ML (ref 18.5–88)
URATE SERPL-MCNC: 5.7 MG/DL (ref 2.3–6.7)

## 2024-12-04 PROCEDURE — 84550 ASSAY OF BLOOD/URIC ACID: CPT

## 2024-12-04 PROCEDURE — 99203 OFFICE O/P NEW LOW 30 MIN: CPT | Performed by: INTERNAL MEDICINE

## 2024-12-04 PROCEDURE — 3008F BODY MASS INDEX DOCD: CPT | Performed by: INTERNAL MEDICINE

## 2024-12-04 PROCEDURE — 83970 ASSAY OF PARATHORMONE: CPT

## 2024-12-04 PROCEDURE — 36415 COLL VENOUS BLD VENIPUNCTURE: CPT

## 2024-12-04 PROCEDURE — 1036F TOBACCO NON-USER: CPT | Performed by: INTERNAL MEDICINE

## 2024-12-04 NOTE — PROGRESS NOTES
Tor Patino   49 y.o.      Vitals:    12/04/24 1329   Weight: 107 kg (235 lb 6.4 oz)      MRN/Room: [id number]/Room/bed info not found      History Of Present Illness  Tor Patino is a 49 y.o. female presenting with kidney stone.     Past Medical History  She has a past medical history of Endometriosis and Pulmonary embolism.    Surgical History  She has a past surgical history that includes Laparoscopic hysterectomy.     Social History  She reports that she has quit smoking. Her smoking use included cigarettes. She has never used smokeless tobacco. She reports that she does not drink alcohol and does not use drugs.    Family History  Family History   Problem Relation Name Age of Onset    Breast cancer Maternal Grandmother      Colon cancer Neg Hx      Ovarian cancer Neg Hx      Uterine cancer Neg Hx          Allergies  Bee pollen      Meds:         Current Outpatient Medications   Medication Sig Dispense Refill    estradiol (Vagifem) 10 mcg tablet vaginal tablet Insert 1 tablet (10 mcg) into the vagina once daily at bedtime. Insert 1 tablet into vagina at bedtime for 2 weeks, then at bedtime twice a week. 18 tablet 3     No current facility-administered medications for this visit.         ROS:  The patient is awake and oriented. No dizziness or lightheadedness. No chills and no fever. No headaches. No nausea and no vomiting. No shortness of breath. No cough. No sputum. No chest pain. No chest tightness. No abdominal pain. No diarrhea and no constipation. No hematemesis or hemoptysis. No hematuria. No rectal bleeding. No melena. No epistaxis. No urinary symptoms. No flank pain. No leg edema. No leg pain. No weakness. No itching. Overall, the rest of the review of systems is also negative.  12 point review of systems otherwise negative as stated in HPI.        Physical Exam:        Vitals:    12/04/24 1329   BP: 127/85   Pulse: 89     General: The patient is awake, oriented, and is not in any distress.  Head  and Neck: Normocephalic. No periorbital edema.  Eyes: Not icteric.   Respiratory: Symmetric air entry. Symmetric chest expansion.No respiratory distress.  Skin: No maculopapular rash.  Musculoskeletal: No peripheral edema in both left and right upper extremities.  No edema in either left or right lower extremities.  Neuro Exam: Speech is fluent. Moves extremities.        Blood Labs:  No results found for this or any previous visit (from the past 24 hours).   Lab Results   Component Value Date    GLUCOSE 94 11/15/2024    CALCIUM 9.1 11/15/2024     11/15/2024    K 4.0 11/15/2024    CO2 23 11/15/2024     11/15/2024    BUN 10 11/15/2024    CREATININE 0.69 11/15/2024       Imaging:  === 10/22/24 ===    US RENAL COMPLETE    - Impression -  Unremarkable sonographic appearance of the urinary bladder and right  kidney.    Possible 7 mm left renal calculus. There is no hydronephrosis.      MACRO:  None    Signed by: Ivan Anderson 10/23/2024 5:59 PM  Dictation workstation:   [id number]      Assessment and Plan:  #1 nephrolithiasis.  The patient has history of multiple kidney stones and she says she has passed several stones.  Recent CAT scan shows a 2 mm stone in left kidney.  She never had any procedure done.  She had a 24-hour urine collection back in June 2023 which shows hypercalciuria.  She also had high sodium in urine.  I ordered the new 24-hour urine collection for stone panel as well as PTH and uric acid level.    I will see her in about 1 months for follow-up.    Mauricio Bills MD  Senior Attending Physician  Director of Onco-Nephrology Program  Division of Nephrology & Hypertension  [facility]

## 2024-12-21 ENCOUNTER — APPOINTMENT (OUTPATIENT)
Dept: RADIOLOGY | Facility: CLINIC | Age: 49
End: 2024-12-21
Payer: COMMERCIAL

## 2024-12-28 ENCOUNTER — APPOINTMENT (OUTPATIENT)
Dept: RADIOLOGY | Facility: CLINIC | Age: 49
End: 2024-12-28
Payer: COMMERCIAL

## 2024-12-30 ENCOUNTER — LAB (OUTPATIENT)
Dept: LAB | Facility: LAB | Age: 49
End: 2024-12-30
Payer: COMMERCIAL

## 2024-12-30 DIAGNOSIS — N20.0 KIDNEY STONE: ICD-10-CM

## 2024-12-30 PROCEDURE — 83735 ASSAY OF MAGNESIUM: CPT

## 2024-12-30 PROCEDURE — 84300 ASSAY OF URINE SODIUM: CPT

## 2024-12-30 PROCEDURE — 83986 ASSAY PH BODY FLUID NOS: CPT

## 2024-12-30 PROCEDURE — 84133 ASSAY OF URINE POTASSIUM: CPT

## 2024-12-30 PROCEDURE — 82340 ASSAY OF CALCIUM IN URINE: CPT

## 2024-12-30 PROCEDURE — 82140 ASSAY OF AMMONIA: CPT

## 2024-12-30 PROCEDURE — 84392 ASSAY OF URINE SULFATE: CPT

## 2024-12-30 PROCEDURE — 84560 ASSAY OF URINE/URIC ACID: CPT

## 2024-12-30 PROCEDURE — 82570 ASSAY OF URINE CREATININE: CPT

## 2024-12-30 PROCEDURE — 82436 ASSAY OF URINE CHLORIDE: CPT

## 2024-12-30 PROCEDURE — 83945 ASSAY OF OXALATE: CPT

## 2024-12-30 PROCEDURE — 83935 ASSAY OF URINE OSMOLALITY: CPT

## 2024-12-30 PROCEDURE — 84540 ASSAY OF URINE/UREA-N: CPT

## 2024-12-30 PROCEDURE — 82507 ASSAY OF CITRATE: CPT

## 2024-12-31 LAB
ALBUMIN SERPL BCP-MCNC: 4.6 G/DL (ref 3.4–5)
ALBUMIN SERPL BCP-MCNC: 4.6 G/DL (ref 3.4–5)
ALBUMIN SERPL BCP-MCNC: 4.7 G/DL (ref 3.4–5)
ALBUMIN SERPL BCP-MCNC: 4.7 G/DL (ref 3.4–5)
ALBUMIN SERPL BCP-MCNC: 4.8 G/DL (ref 3.4–5)
ALBUMIN SERPL BCP-MCNC: 5.1 G/DL (ref 3.4–5)
ALP SERPL-CCNC: 48 U/L (ref 33–110)
ALP SERPL-CCNC: 53 U/L (ref 33–110)
ALP SERPL-CCNC: 56 U/L (ref 33–110)
ALP SERPL-CCNC: 58 U/L (ref 33–110)
ALP SERPL-CCNC: 58 U/L (ref 33–110)
ALP SERPL-CCNC: 74 U/L (ref 33–110)
ALT SERPL W P-5'-P-CCNC: 14 U/L (ref 7–45)
ALT SERPL W P-5'-P-CCNC: 15 U/L (ref 7–45)
ALT SERPL W P-5'-P-CCNC: 17 U/L (ref 7–45)
ALT SERPL W P-5'-P-CCNC: 18 U/L (ref 7–45)
ALT SERPL W P-5'-P-CCNC: 20 U/L (ref 7–45)
ALT SERPL W P-5'-P-CCNC: 23 U/L (ref 7–45)
ANION GAP SERPL CALC-SCNC: 12 MMOL/L
ANION GAP SERPL CALC-SCNC: 12 MMOL/L (ref 10–20)
ANION GAP SERPL CALC-SCNC: 12 MMOL/L (ref 10–20)
ANION GAP SERPL CALC-SCNC: 13 MMOL/L (ref 10–20)
ANION GAP SERPL CALC-SCNC: 13 MMOL/L (ref 10–20)
ANION GAP SERPL CALC-SCNC: 14 MMOL/L (ref 10–20)
AST SERPL W P-5'-P-CCNC: 12 U/L (ref 9–39)
AST SERPL W P-5'-P-CCNC: 14 U/L (ref 9–39)
AST SERPL W P-5'-P-CCNC: 15 U/L (ref 9–39)
AST SERPL W P-5'-P-CCNC: 16 U/L (ref 9–39)
AST SERPL W P-5'-P-CCNC: 16 U/L (ref 9–39)
AST SERPL W P-5'-P-CCNC: 21 U/L (ref 9–39)
BILIRUB SERPL-MCNC: 0.6 MG/DL (ref 0–1.2)
BILIRUB SERPL-MCNC: 0.7 MG/DL (ref 0–1.2)
BILIRUB SERPL-MCNC: 0.8 MG/DL (ref 0–1.2)
BILIRUB SERPL-MCNC: 0.8 MG/DL (ref 0–1.2)
BILIRUB SERPL-MCNC: 0.9 MG/DL (ref 0–1.2)
BILIRUB SERPL-MCNC: 1.1 MG/DL (ref 0–1.2)
BUN SERPL-MCNC: 10 MG/DL (ref 6–23)
BUN SERPL-MCNC: 10 MG/DL (ref 6–23)
BUN SERPL-MCNC: 12 MG/DL (ref 6–23)
BUN SERPL-MCNC: 15 MG/DL (ref 6–23)
BUN SERPL-MCNC: 6 MG/DL (ref 6–23)
BUN SERPL-MCNC: 9 MG/DL (ref 6–23)
CALCIUM SERPL-MCNC: 10.2 MG/DL (ref 8.6–10.3)
CALCIUM SERPL-MCNC: 9.1 MG/DL (ref 8.6–10.3)
CALCIUM SERPL-MCNC: 9.2 MG/DL (ref 8.6–10.3)
CALCIUM SERPL-MCNC: 9.6 MG/DL (ref 8.6–10.3)
CALCIUM SERPL-MCNC: 9.8 MG/DL (ref 8.6–10.3)
CALCIUM SERPL-MCNC: 9.9 MG/DL (ref 8.6–10.3)
CHLORIDE SERPL-SCNC: 101 MMOL/L (ref 98–107)
CHLORIDE SERPL-SCNC: 102 MMOL/L (ref 98–107)
CHLORIDE SERPL-SCNC: 103 MMOL/L (ref 98–107)
CHLORIDE SERPL-SCNC: 103 MMOL/L (ref 98–107)
CHLORIDE SERPL-SCNC: 106 MMOL/L (ref 98–107)
CHLORIDE SERPL-SCNC: 106 MMOL/L (ref 98–107)
CHOLEST SERPL-MCNC: 155 MG/DL (ref 0–199)
CHOLESTEROL/HDL RATIO: 3.1
CO2 SERPL-SCNC: 23 MMOL/L (ref 21–32)
CO2 SERPL-SCNC: 25 MMOL/L (ref 21–32)
CO2 SERPL-SCNC: 26 MMOL/L (ref 21–32)
CO2 SERPL-SCNC: 27 MMOL/L (ref 21–32)
CO2 SERPL-SCNC: 28 MMOL/L (ref 21–32)
CO2 SERPL-SCNC: 29 MMOL/L (ref 21–32)
CREAT SERPL-MCNC: 0.69 MG/DL (ref 0.5–1.05)
CREAT SERPL-MCNC: 0.75 MG/DL (ref 0.5–1.05)
CREAT SERPL-MCNC: 0.82 MG/DL (ref 0.5–1.05)
CREAT SERPL-MCNC: 0.83 MG/DL (ref 0.5–1.05)
CREAT SERPL-MCNC: 0.85 MG/DL (ref 0.5–1.05)
CREAT SERPL-MCNC: 0.96 MG/DL (ref 0.5–1.05)
EGFRCR SERPLBLD CKD-EPI 2021: 74 ML/MIN/1.73M*2
EGFRCR SERPLBLD CKD-EPI 2021: 85 ML/MIN/1.73M*2
EGFRCR SERPLBLD CKD-EPI 2021: 87 ML/MIN/1.73M*2
EGFRCR SERPLBLD CKD-EPI 2021: 88 ML/MIN/1.73M*2
EGFRCR SERPLBLD CKD-EPI 2021: >90 ML/MIN/1.73M*2
EGFRCR SERPLBLD CKD-EPI 2021: >90 ML/MIN/1.73M*2
GLUCOSE SERPL-MCNC: 150 MG/DL (ref 74–99)
GLUCOSE SERPL-MCNC: 81 MG/DL (ref 74–99)
GLUCOSE SERPL-MCNC: 89 MG/DL (ref 74–99)
GLUCOSE SERPL-MCNC: 94 MG/DL (ref 74–99)
GLUCOSE SERPL-MCNC: 94 MG/DL (ref 74–99)
GLUCOSE SERPL-MCNC: 99 MG/DL (ref 74–99)
HDLC SERPL-MCNC: 50.1 MG/DL
LDLC SERPL CALC-MCNC: 83 MG/DL
NON HDL CHOLESTEROL: 105 MG/DL (ref 0–149)
POTASSIUM SERPL-SCNC: 3.2 MMOL/L (ref 3.5–5.3)
POTASSIUM SERPL-SCNC: 3.7 MMOL/L (ref 3.5–5.3)
POTASSIUM SERPL-SCNC: 3.8 MMOL/L (ref 3.5–5.3)
POTASSIUM SERPL-SCNC: 3.8 MMOL/L (ref 3.5–5.3)
POTASSIUM SERPL-SCNC: 4 MMOL/L (ref 3.5–5.3)
POTASSIUM SERPL-SCNC: 4.2 MMOL/L (ref 3.5–5.3)
PROT SERPL-MCNC: 6.7 G/DL (ref 6.4–8.2)
PROT SERPL-MCNC: 7.1 G/DL (ref 6.4–8.2)
PROT SERPL-MCNC: 7.1 G/DL (ref 6.4–8.2)
PROT SERPL-MCNC: 7.5 G/DL (ref 6.4–8.2)
PROT SERPL-MCNC: 7.5 G/DL (ref 6.4–8.2)
PROT SERPL-MCNC: 7.9 G/DL (ref 6.4–8.2)
SODIUM SERPL-SCNC: 138 MMOL/L (ref 136–145)
SODIUM SERPL-SCNC: 138 MMOL/L (ref 136–145)
SODIUM SERPL-SCNC: 139 MMOL/L (ref 136–145)
TRIGL SERPL-MCNC: 109 MG/DL (ref 0–149)
VLDL: 22 MG/DL (ref 0–40)

## 2025-01-02 ENCOUNTER — APPOINTMENT (OUTPATIENT)
Dept: NEPHROLOGY | Facility: CLINIC | Age: 50
End: 2025-01-02
Payer: COMMERCIAL

## 2025-01-05 LAB
AMMONIA 24H UR-SRATE: 45 MMOL/24 H (ref 15–56)
BRUSHITE CRYSTAL(MAYO): 1.83 DG
BSA: ABNORMAL 1.73M(2)
CALCIUM 24H UR-MRATE: 558 MG/24 H
CAOX 24H ENGDIFF UR: 2.95 DG
CHLORIDE 24H UR-SRATE: 173 MMOL/24 H (ref 34–286)
CITRATE 24H UR-MRATE: 454 MG/24 H (ref 349–1191)
COLLECT DURATION TIME UR: 24 H
CREAT 24H UR-MRATE: 1818 MG/24 H (ref 603–1783)
HYDROXYAPATITE 24H ENGDIFF UR: 5.94 DG
MAGNESIUM 24H UR-MRATE: 90 MG/24 H (ref 51–269)
OSMOLALITY 24H UR: 565 MOSM/KG (ref 150–1150)
OXALATE 24H UR-MRATE: 33.4 MG/24 H (ref 9.7–40.5)
OXALATE 24H UR-SRATE: 0.38 MMOL/24 H (ref 0.11–0.46)
PH 24H UR: 6.1 [PH] (ref 4.5–8)
PHOSPHATE 24H UR-MRATE: 1152 MG/24 H (ref 226–1797)
POTASSIUM 24H UR-SRATE: 52 MMOL/24 H (ref 16–105)
PROTEIN CATABOLIC RATE 24H UR-MRATE: 98 G/24 H (ref 56–125)
SODIUM 24H UR-SRATE: 196 MMOL/24 H (ref 22–328)
SPECIMEN VOL 24H UR: 1800 ML
SULFATE 24H UR-SRATE: 23 MMOL/24 H (ref 7–47)
URATE 24H UR-MRATE: 648 MG/24 H (ref 250–750)
URIC ACID CRYSTAL(MAYO): 0.36 DG
URINALYSIS SPECIALIST REVIEW: ABNORMAL
UUN 24H UR-MRATE: 11.7 G/24 H (ref 7–42)

## 2025-01-06 DIAGNOSIS — R82.994 HYPERCALCIURIA: Primary | ICD-10-CM

## 2025-01-06 DIAGNOSIS — N20.0 KIDNEY STONE: ICD-10-CM

## 2025-01-06 RX ORDER — HYDROCHLOROTHIAZIDE 25 MG/1
25 TABLET ORAL DAILY
Qty: 90 TABLET | Refills: 3 | Status: SHIPPED | OUTPATIENT
Start: 2025-01-06 | End: 2026-01-06

## 2025-01-07 ENCOUNTER — HOSPITAL ENCOUNTER (OUTPATIENT)
Facility: HOSPITAL | Age: 50
Setting detail: OBSERVATION
Discharge: HOME | End: 2025-01-09
Attending: STUDENT IN AN ORGANIZED HEALTH CARE EDUCATION/TRAINING PROGRAM | Admitting: INTERNAL MEDICINE
Payer: COMMERCIAL

## 2025-01-07 ENCOUNTER — APPOINTMENT (OUTPATIENT)
Dept: CARDIOLOGY | Facility: HOSPITAL | Age: 50
End: 2025-01-07
Payer: COMMERCIAL

## 2025-01-07 ENCOUNTER — TELEPHONE (OUTPATIENT)
Dept: NEPHROLOGY | Facility: CLINIC | Age: 50
End: 2025-01-07

## 2025-01-07 ENCOUNTER — APPOINTMENT (OUTPATIENT)
Dept: RADIOLOGY | Facility: CLINIC | Age: 50
End: 2025-01-07
Payer: COMMERCIAL

## 2025-01-07 ENCOUNTER — APPOINTMENT (OUTPATIENT)
Dept: NEPHROLOGY | Facility: CLINIC | Age: 50
End: 2025-01-07
Payer: COMMERCIAL

## 2025-01-07 ENCOUNTER — APPOINTMENT (OUTPATIENT)
Dept: RADIOLOGY | Facility: HOSPITAL | Age: 50
End: 2025-01-07
Payer: COMMERCIAL

## 2025-01-07 VITALS
SYSTOLIC BLOOD PRESSURE: 114 MMHG | WEIGHT: 238.4 LBS | BODY MASS INDEX: 40.7 KG/M2 | DIASTOLIC BLOOD PRESSURE: 78 MMHG | HEART RATE: 73 BPM | HEIGHT: 64 IN

## 2025-01-07 DIAGNOSIS — R82.994 HYPERCALCIURIA: ICD-10-CM

## 2025-01-07 DIAGNOSIS — N20.0 KIDNEY STONE: Primary | ICD-10-CM

## 2025-01-07 DIAGNOSIS — K85.10 ACUTE BILIARY PANCREATITIS WITHOUT INFECTION OR NECROSIS (HHS-HCC): Primary | ICD-10-CM

## 2025-01-07 DIAGNOSIS — R74.01 TRANSAMINITIS: ICD-10-CM

## 2025-01-07 DIAGNOSIS — R17 ELEVATED BILIRUBIN: ICD-10-CM

## 2025-01-07 DIAGNOSIS — K85.90 ACUTE PANCREATITIS, UNSPECIFIED COMPLICATION STATUS, UNSPECIFIED PANCREATITIS TYPE (HHS-HCC): ICD-10-CM

## 2025-01-07 LAB
ALBUMIN SERPL BCP-MCNC: 2.9 G/DL (ref 3.4–5)
ALP SERPL-CCNC: 330 U/L (ref 33–110)
ALT SERPL W P-5'-P-CCNC: 149 U/L (ref 7–45)
ANION GAP SERPL CALC-SCNC: 12 MMOL/L (ref 10–20)
APPEARANCE UR: CLEAR
AST SERPL W P-5'-P-CCNC: 110 U/L (ref 9–39)
BASOPHILS # BLD AUTO: 0.05 X10*3/UL (ref 0–0.1)
BASOPHILS NFR BLD AUTO: 0.5 %
BILIRUB SERPL-MCNC: 4.1 MG/DL (ref 0–1.2)
BILIRUB UR STRIP.AUTO-MCNC: NEGATIVE MG/DL
BUN SERPL-MCNC: 23 MG/DL (ref 6–23)
CALCIUM SERPL-MCNC: 8 MG/DL (ref 8.6–10.3)
CARDIAC TROPONIN I PNL SERPL HS: 3 NG/L (ref 0–13)
CARDIAC TROPONIN I PNL SERPL HS: 3 NG/L (ref 0–13)
CHLORIDE SERPL-SCNC: 103 MMOL/L (ref 98–107)
CO2 SERPL-SCNC: 25 MMOL/L (ref 21–32)
COLOR UR: COLORLESS
CREAT SERPL-MCNC: 1.35 MG/DL (ref 0.5–1.05)
EGFRCR SERPLBLD CKD-EPI 2021: 49 ML/MIN/1.73M*2
EOSINOPHIL # BLD AUTO: 0.28 X10*3/UL (ref 0–0.7)
EOSINOPHIL NFR BLD AUTO: 2.8 %
ERYTHROCYTE [DISTWIDTH] IN BLOOD BY AUTOMATED COUNT: 14 % (ref 11.5–14.5)
GLUCOSE SERPL-MCNC: 125 MG/DL (ref 74–99)
GLUCOSE UR STRIP.AUTO-MCNC: NORMAL MG/DL
HCT VFR BLD AUTO: 38.6 % (ref 36–46)
HGB BLD-MCNC: 13.5 G/DL (ref 12–16)
IMM GRANULOCYTES # BLD AUTO: 0.05 X10*3/UL (ref 0–0.7)
IMM GRANULOCYTES NFR BLD AUTO: 0.5 % (ref 0–0.9)
KETONES UR STRIP.AUTO-MCNC: NEGATIVE MG/DL
LACTATE SERPL-SCNC: 1.2 MMOL/L (ref 0.4–2)
LEUKOCYTE ESTERASE UR QL STRIP.AUTO: NEGATIVE
LIPASE SERPL-CCNC: 875 U/L (ref 9–82)
LYMPHOCYTES # BLD AUTO: 3.43 X10*3/UL (ref 1.2–4.8)
LYMPHOCYTES NFR BLD AUTO: 34.4 %
MCH RBC QN AUTO: 30.2 PG (ref 26–34)
MCHC RBC AUTO-ENTMCNC: 35 G/DL (ref 32–36)
MCV RBC AUTO: 86 FL (ref 80–100)
MONOCYTES # BLD AUTO: 0.34 X10*3/UL (ref 0.1–1)
MONOCYTES NFR BLD AUTO: 3.4 %
NEUTROPHILS # BLD AUTO: 5.81 X10*3/UL (ref 1.2–7.7)
NEUTROPHILS NFR BLD AUTO: 58.4 %
NITRITE UR QL STRIP.AUTO: NEGATIVE
NRBC BLD-RTO: 0 /100 WBCS (ref 0–0)
PH UR STRIP.AUTO: 6.5 [PH]
PLATELET # BLD AUTO: 337 X10*3/UL (ref 150–450)
POTASSIUM SERPL-SCNC: 3.5 MMOL/L (ref 3.5–5.3)
PROT SERPL-MCNC: 5.6 G/DL (ref 6.4–8.2)
PROT UR STRIP.AUTO-MCNC: NEGATIVE MG/DL
RBC # BLD AUTO: 4.47 X10*6/UL (ref 4–5.2)
RBC # UR STRIP.AUTO: NEGATIVE /UL
SODIUM SERPL-SCNC: 136 MMOL/L (ref 136–145)
SP GR UR STRIP.AUTO: 1.01
UROBILINOGEN UR STRIP.AUTO-MCNC: NORMAL MG/DL
WBC # BLD AUTO: 10 X10*3/UL (ref 4.4–11.3)

## 2025-01-07 PROCEDURE — G0378 HOSPITAL OBSERVATION PER HR: HCPCS

## 2025-01-07 PROCEDURE — 71275 CT ANGIOGRAPHY CHEST: CPT | Performed by: RADIOLOGY

## 2025-01-07 PROCEDURE — 96375 TX/PRO/DX INJ NEW DRUG ADDON: CPT

## 2025-01-07 PROCEDURE — 83690 ASSAY OF LIPASE: CPT

## 2025-01-07 PROCEDURE — 99285 EMERGENCY DEPT VISIT HI MDM: CPT | Mod: 25 | Performed by: STUDENT IN AN ORGANIZED HEALTH CARE EDUCATION/TRAINING PROGRAM

## 2025-01-07 PROCEDURE — 36415 COLL VENOUS BLD VENIPUNCTURE: CPT | Performed by: STUDENT IN AN ORGANIZED HEALTH CARE EDUCATION/TRAINING PROGRAM

## 2025-01-07 PROCEDURE — 84484 ASSAY OF TROPONIN QUANT: CPT | Performed by: STUDENT IN AN ORGANIZED HEALTH CARE EDUCATION/TRAINING PROGRAM

## 2025-01-07 PROCEDURE — 93005 ELECTROCARDIOGRAM TRACING: CPT

## 2025-01-07 PROCEDURE — 96376 TX/PRO/DX INJ SAME DRUG ADON: CPT

## 2025-01-07 PROCEDURE — 85025 COMPLETE CBC W/AUTO DIFF WBC: CPT | Performed by: STUDENT IN AN ORGANIZED HEALTH CARE EDUCATION/TRAINING PROGRAM

## 2025-01-07 PROCEDURE — 3008F BODY MASS INDEX DOCD: CPT | Performed by: INTERNAL MEDICINE

## 2025-01-07 PROCEDURE — 99214 OFFICE O/P EST MOD 30 MIN: CPT | Performed by: INTERNAL MEDICINE

## 2025-01-07 PROCEDURE — 2500000001 HC RX 250 WO HCPCS SELF ADMINISTERED DRUGS (ALT 637 FOR MEDICARE OP): Performed by: STUDENT IN AN ORGANIZED HEALTH CARE EDUCATION/TRAINING PROGRAM

## 2025-01-07 PROCEDURE — 84075 ASSAY ALKALINE PHOSPHATASE: CPT | Performed by: STUDENT IN AN ORGANIZED HEALTH CARE EDUCATION/TRAINING PROGRAM

## 2025-01-07 PROCEDURE — 96372 THER/PROPH/DIAG INJ SC/IM: CPT | Performed by: NURSE PRACTITIONER

## 2025-01-07 PROCEDURE — 74174 CTA ABD&PLVS W/CONTRAST: CPT | Performed by: RADIOLOGY

## 2025-01-07 PROCEDURE — 2550000001 HC RX 255 CONTRASTS: Performed by: STUDENT IN AN ORGANIZED HEALTH CARE EDUCATION/TRAINING PROGRAM

## 2025-01-07 PROCEDURE — 71275 CT ANGIOGRAPHY CHEST: CPT

## 2025-01-07 PROCEDURE — 2500000004 HC RX 250 GENERAL PHARMACY W/ HCPCS (ALT 636 FOR OP/ED)

## 2025-01-07 PROCEDURE — 2500000004 HC RX 250 GENERAL PHARMACY W/ HCPCS (ALT 636 FOR OP/ED): Performed by: NURSE PRACTITIONER

## 2025-01-07 PROCEDURE — 83605 ASSAY OF LACTIC ACID: CPT

## 2025-01-07 PROCEDURE — 81003 URINALYSIS AUTO W/O SCOPE: CPT | Performed by: STUDENT IN AN ORGANIZED HEALTH CARE EDUCATION/TRAINING PROGRAM

## 2025-01-07 PROCEDURE — 96361 HYDRATE IV INFUSION ADD-ON: CPT

## 2025-01-07 RX ORDER — ONDANSETRON HYDROCHLORIDE 2 MG/ML
4 INJECTION, SOLUTION INTRAVENOUS EVERY 8 HOURS PRN
Status: DISCONTINUED | OUTPATIENT
Start: 2025-01-07 | End: 2025-01-08

## 2025-01-07 RX ORDER — ONDANSETRON HYDROCHLORIDE 2 MG/ML
4 INJECTION, SOLUTION INTRAVENOUS ONCE
Status: COMPLETED | OUTPATIENT
Start: 2025-01-07 | End: 2025-01-07

## 2025-01-07 RX ORDER — ONDANSETRON 4 MG/1
4 TABLET, FILM COATED ORAL EVERY 8 HOURS PRN
Status: DISCONTINUED | OUTPATIENT
Start: 2025-01-07 | End: 2025-01-08

## 2025-01-07 RX ORDER — HYDROMORPHONE HYDROCHLORIDE 0.2 MG/ML
0.2 INJECTION INTRAMUSCULAR; INTRAVENOUS; SUBCUTANEOUS
Status: DISCONTINUED | OUTPATIENT
Start: 2025-01-07 | End: 2025-01-09

## 2025-01-07 RX ORDER — SODIUM CHLORIDE, SODIUM LACTATE, POTASSIUM CHLORIDE, CALCIUM CHLORIDE 600; 310; 30; 20 MG/100ML; MG/100ML; MG/100ML; MG/100ML
100 INJECTION, SOLUTION INTRAVENOUS CONTINUOUS
Status: DISCONTINUED | OUTPATIENT
Start: 2025-01-07 | End: 2025-01-08

## 2025-01-07 RX ORDER — KETOROLAC TROMETHAMINE 15 MG/ML
15 INJECTION, SOLUTION INTRAMUSCULAR; INTRAVENOUS ONCE
Status: COMPLETED | OUTPATIENT
Start: 2025-01-07 | End: 2025-01-07

## 2025-01-07 RX ORDER — MORPHINE SULFATE 4 MG/ML
4 INJECTION, SOLUTION INTRAMUSCULAR; INTRAVENOUS ONCE
Status: COMPLETED | OUTPATIENT
Start: 2025-01-07 | End: 2025-01-07

## 2025-01-07 RX ORDER — HEPARIN SODIUM 5000 [USP'U]/ML
7500 INJECTION, SOLUTION INTRAVENOUS; SUBCUTANEOUS EVERY 8 HOURS SCHEDULED
Status: DISCONTINUED | OUTPATIENT
Start: 2025-01-07 | End: 2025-01-09 | Stop reason: HOSPADM

## 2025-01-07 RX ORDER — KETOROLAC TROMETHAMINE 15 MG/ML
15 INJECTION, SOLUTION INTRAMUSCULAR; INTRAVENOUS ONCE
Status: DISCONTINUED | OUTPATIENT
Start: 2025-01-07 | End: 2025-01-07

## 2025-01-07 RX ORDER — PANTOPRAZOLE SODIUM 40 MG/1
40 TABLET, DELAYED RELEASE ORAL
Status: DISCONTINUED | OUTPATIENT
Start: 2025-01-08 | End: 2025-01-09 | Stop reason: HOSPADM

## 2025-01-07 RX ORDER — POLYETHYLENE GLYCOL 3350 17 G/17G
17 POWDER, FOR SOLUTION ORAL DAILY PRN
Status: DISCONTINUED | OUTPATIENT
Start: 2025-01-07 | End: 2025-01-09 | Stop reason: HOSPADM

## 2025-01-07 RX ORDER — ACETAMINOPHEN 325 MG/1
975 TABLET ORAL ONCE
Status: COMPLETED | OUTPATIENT
Start: 2025-01-07 | End: 2025-01-07

## 2025-01-07 RX ORDER — LORAZEPAM 2 MG/ML
1 INJECTION INTRAMUSCULAR ONCE AS NEEDED
Status: COMPLETED | OUTPATIENT
Start: 2025-01-07 | End: 2025-01-08

## 2025-01-07 RX ORDER — PANTOPRAZOLE SODIUM 40 MG/10ML
40 INJECTION, POWDER, LYOPHILIZED, FOR SOLUTION INTRAVENOUS
Status: DISCONTINUED | OUTPATIENT
Start: 2025-01-08 | End: 2025-01-09 | Stop reason: HOSPADM

## 2025-01-07 RX ADMIN — MORPHINE SULFATE 4 MG: 4 INJECTION, SOLUTION INTRAMUSCULAR; INTRAVENOUS at 14:23

## 2025-01-07 RX ADMIN — HYDROMORPHONE HYDROCHLORIDE 0.5 MG: 1 INJECTION, SOLUTION INTRAMUSCULAR; INTRAVENOUS; SUBCUTANEOUS at 22:58

## 2025-01-07 RX ADMIN — HYDROMORPHONE HYDROCHLORIDE 0.5 MG: 1 INJECTION, SOLUTION INTRAMUSCULAR; INTRAVENOUS; SUBCUTANEOUS at 20:08

## 2025-01-07 RX ADMIN — ONDANSETRON 4 MG: 2 INJECTION INTRAMUSCULAR; INTRAVENOUS at 14:23

## 2025-01-07 RX ADMIN — HEPARIN SODIUM 7500 UNITS: 5000 INJECTION, SOLUTION INTRAVENOUS; SUBCUTANEOUS at 22:52

## 2025-01-07 RX ADMIN — ACETAMINOPHEN 975 MG: 325 TABLET ORAL at 15:19

## 2025-01-07 RX ADMIN — SODIUM CHLORIDE, POTASSIUM CHLORIDE, SODIUM LACTATE AND CALCIUM CHLORIDE 100 ML/HR: 600; 310; 30; 20 INJECTION, SOLUTION INTRAVENOUS at 22:50

## 2025-01-07 RX ADMIN — SODIUM CHLORIDE, POTASSIUM CHLORIDE, SODIUM LACTATE AND CALCIUM CHLORIDE 1000 ML: 600; 310; 30; 20 INJECTION, SOLUTION INTRAVENOUS at 16:46

## 2025-01-07 RX ADMIN — ONDANSETRON 4 MG: 2 INJECTION INTRAMUSCULAR; INTRAVENOUS at 16:45

## 2025-01-07 RX ADMIN — KETOROLAC TROMETHAMINE 15 MG: 15 INJECTION, SOLUTION INTRAMUSCULAR; INTRAVENOUS at 19:11

## 2025-01-07 RX ADMIN — IOHEXOL 90 ML: 350 INJECTION, SOLUTION INTRAVENOUS at 16:04

## 2025-01-07 RX ADMIN — ONDANSETRON 4 MG: 2 INJECTION INTRAMUSCULAR; INTRAVENOUS at 20:09

## 2025-01-07 RX ADMIN — HYDROMORPHONE HYDROCHLORIDE 0.5 MG: 1 INJECTION, SOLUTION INTRAMUSCULAR; INTRAVENOUS; SUBCUTANEOUS at 16:46

## 2025-01-07 RX ADMIN — HYDROMORPHONE HYDROCHLORIDE 0.5 MG: 1 INJECTION, SOLUTION INTRAMUSCULAR; INTRAVENOUS; SUBCUTANEOUS at 19:08

## 2025-01-07 SDOH — SOCIAL STABILITY: SOCIAL INSECURITY: ARE YOU OR HAVE YOU BEEN THREATENED OR ABUSED PHYSICALLY, EMOTIONALLY, OR SEXUALLY BY ANYONE?: NO

## 2025-01-07 SDOH — SOCIAL STABILITY: SOCIAL INSECURITY: DOES ANYONE TRY TO KEEP YOU FROM HAVING/CONTACTING OTHER FRIENDS OR DOING THINGS OUTSIDE YOUR HOME?: NO

## 2025-01-07 SDOH — SOCIAL STABILITY: SOCIAL INSECURITY: HAS ANYONE EVER THREATENED TO HURT YOUR FAMILY OR YOUR PETS?: NO

## 2025-01-07 SDOH — SOCIAL STABILITY: SOCIAL INSECURITY: WERE YOU ABLE TO COMPLETE ALL THE BEHAVIORAL HEALTH SCREENINGS?: YES

## 2025-01-07 SDOH — SOCIAL STABILITY: SOCIAL INSECURITY: HAVE YOU HAD THOUGHTS OF HARMING ANYONE ELSE?: NO

## 2025-01-07 SDOH — SOCIAL STABILITY: SOCIAL INSECURITY: HAVE YOU HAD ANY THOUGHTS OF HARMING ANYONE ELSE?: NO

## 2025-01-07 SDOH — SOCIAL STABILITY: SOCIAL INSECURITY: DO YOU FEEL UNSAFE GOING BACK TO THE PLACE WHERE YOU ARE LIVING?: NO

## 2025-01-07 SDOH — SOCIAL STABILITY: SOCIAL INSECURITY: ABUSE: ADULT

## 2025-01-07 SDOH — SOCIAL STABILITY: SOCIAL INSECURITY: ARE THERE ANY APPARENT SIGNS OF INJURIES/BEHAVIORS THAT COULD BE RELATED TO ABUSE/NEGLECT?: NO

## 2025-01-07 SDOH — SOCIAL STABILITY: SOCIAL INSECURITY: DO YOU FEEL ANYONE HAS EXPLOITED OR TAKEN ADVANTAGE OF YOU FINANCIALLY OR OF YOUR PERSONAL PROPERTY?: NO

## 2025-01-07 ASSESSMENT — COGNITIVE AND FUNCTIONAL STATUS - GENERAL
MOBILITY SCORE: 24
DAILY ACTIVITIY SCORE: 24
PATIENT BASELINE BEDBOUND: NO

## 2025-01-07 ASSESSMENT — PAIN - FUNCTIONAL ASSESSMENT
PAIN_FUNCTIONAL_ASSESSMENT: 0-10

## 2025-01-07 ASSESSMENT — PAIN DESCRIPTION - ORIENTATION
ORIENTATION: LEFT
ORIENTATION: LEFT
ORIENTATION: RIGHT

## 2025-01-07 ASSESSMENT — ENCOUNTER SYMPTOMS
CONSTIPATION: 0
MUSCULOSKELETAL NEGATIVE: 1
NAUSEA: 1
VOMITING: 1
ABDOMINAL PAIN: 1
CARDIOVASCULAR NEGATIVE: 1
PSYCHIATRIC NEGATIVE: 1
APPETITE CHANGE: 1
NEUROLOGICAL NEGATIVE: 1
CHILLS: 0
FEVER: 0
DIARRHEA: 1
RESPIRATORY NEGATIVE: 1
ABDOMINAL DISTENTION: 0

## 2025-01-07 ASSESSMENT — PAIN SCALES - GENERAL
PAINLEVEL_OUTOF10: 8
PAINLEVEL_OUTOF10: 4
PAINLEVEL_OUTOF10: 8
PAINLEVEL_OUTOF10: 6
PAINLEVEL_OUTOF10: 6
PAINLEVEL_OUTOF10: 7
PAINLEVEL_OUTOF10: 6
PAINLEVEL_OUTOF10: 8
PAINLEVEL_OUTOF10: 4
PAINLEVEL_OUTOF10: 0 - NO PAIN

## 2025-01-07 ASSESSMENT — LIFESTYLE VARIABLES
AUDIT-C TOTAL SCORE: 0
EVER HAD A DRINK FIRST THING IN THE MORNING TO STEADY YOUR NERVES TO GET RID OF A HANGOVER: NO
HOW OFTEN DO YOU HAVE 6 OR MORE DRINKS ON ONE OCCASION: NEVER
HAVE PEOPLE ANNOYED YOU BY CRITICIZING YOUR DRINKING: NO
AUDIT-C TOTAL SCORE: 0
SKIP TO QUESTIONS 9-10: 1
EVER FELT BAD OR GUILTY ABOUT YOUR DRINKING: NO
HOW OFTEN DO YOU HAVE A DRINK CONTAINING ALCOHOL: NEVER
HAVE YOU EVER FELT YOU SHOULD CUT DOWN ON YOUR DRINKING: NO
TOTAL SCORE: 0
HOW MANY STANDARD DRINKS CONTAINING ALCOHOL DO YOU HAVE ON A TYPICAL DAY: PATIENT DOES NOT DRINK

## 2025-01-07 ASSESSMENT — ACTIVITIES OF DAILY LIVING (ADL)
HEARING - RIGHT EAR: FUNCTIONAL
DRESSING YOURSELF: INDEPENDENT
PATIENT'S MEMORY ADEQUATE TO SAFELY COMPLETE DAILY ACTIVITIES?: YES
TOILETING: INDEPENDENT
JUDGMENT_ADEQUATE_SAFELY_COMPLETE_DAILY_ACTIVITIES: YES
ADEQUATE_TO_COMPLETE_ADL: YES
BATHING: INDEPENDENT
GROOMING: INDEPENDENT
HEARING - LEFT EAR: FUNCTIONAL
ASSISTIVE_DEVICE: DENTURES PARTIAL
FEEDING YOURSELF: INDEPENDENT
WALKS IN HOME: INDEPENDENT
LACK_OF_TRANSPORTATION: NO

## 2025-01-07 ASSESSMENT — PAIN DESCRIPTION - LOCATION
LOCATION: BACK
LOCATION: ABDOMEN

## 2025-01-07 ASSESSMENT — PAIN DESCRIPTION - PAIN TYPE
TYPE: ACUTE PAIN

## 2025-01-07 ASSESSMENT — COLUMBIA-SUICIDE SEVERITY RATING SCALE - C-SSRS
2. HAVE YOU ACTUALLY HAD ANY THOUGHTS OF KILLING YOURSELF?: NO
6. HAVE YOU EVER DONE ANYTHING, STARTED TO DO ANYTHING, OR PREPARED TO DO ANYTHING TO END YOUR LIFE?: NO
1. IN THE PAST MONTH, HAVE YOU WISHED YOU WERE DEAD OR WISHED YOU COULD GO TO SLEEP AND NOT WAKE UP?: NO

## 2025-01-07 ASSESSMENT — PAIN SCALES - WONG BAKER
WONGBAKER_NUMERICALRESPONSE: HURTS LITTLE BIT
WONGBAKER_NUMERICALRESPONSE: HURTS LITTLE BIT

## 2025-01-07 ASSESSMENT — PAIN DESCRIPTION - PROGRESSION: CLINICAL_PROGRESSION: NOT CHANGED

## 2025-01-07 ASSESSMENT — PAIN DESCRIPTION - FREQUENCY: FREQUENCY: CONSTANT/CONTINUOUS

## 2025-01-07 ASSESSMENT — PATIENT HEALTH QUESTIONNAIRE - PHQ9
SUM OF ALL RESPONSES TO PHQ9 QUESTIONS 1 & 2: 0
1. LITTLE INTEREST OR PLEASURE IN DOING THINGS: NOT AT ALL
2. FEELING DOWN, DEPRESSED OR HOPELESS: NOT AT ALL

## 2025-01-07 NOTE — H&P
"History Of Present Illness  Tor Patino is a 49 y.o. female with history of kidney stones, pyelonephritis, mild A1c history of PE/DVT, prior hysterectomy, shawn, hyperlipidemia presents to [facility] with complaints of left flank pain. She states her symptoms actually started a week ago on Sunday. She started having cramping in her abdomen accompanied by nausea and diarrhea. She states she thought she got a \"GI bug\" but the cramping never really went away. She states she has a history of kidney stones so she went to day to see her nephrologist because last night the left sided abd pain became worse accompanied by nausea and vomiting. She was seen earlier in the day at Dr. Bills's office, per his note   \"Her 24-hour urine collection shows hypercalciuria.  I put her on hydrochlorothiazide\". -CT imaging obtained today - Redemonstration of nonobstructing stone within the interpolar region of the left kidney measuring 2 mm.  Mild thickening of the rectum and sigmoid colon with mild surrounding hyperemia can be seen in the setting of proctocolitis. She states after the CT, she is having diffuse upper abdominal pain and nausea. She is able to urinate and not having any frequency urgency or burning. History of Shawn, possibility patient passed stone, -Bili 4.1-Alkaline phos 330, , , lipase 875, she will be admitted for symptom control with dilaudid and Zofran, MRCP is ordered and Gastroenterology placed on consult.      Past Medical History  Acquired keratoderma  Anxiety  Benign neoplasm of skin of lower limb  Carpal tunnel syndrome  Chronic urinary tract infection  Constipation  Endometriosis  Backache  Goiter  History of thromboembolism of vein  Kidney stone  Leukocytosis  Mixed hyperlipidemia  Neuropathy  Obesity due to excess calories  Opioid type dependence, abuse (Multi)  Pulmonary embolism (Multi)  Pelvic adhesive disease  SOB (shortness of breath)  Xerosis cutis  Small bowel " obstruction (Multi)  Patient reports that her PE was provoked by surgery and she was only anticoagulated for about 6 months     Surgical History  She has a past surgical history that includes Laparoscopic hysterectomy. Nae Appy     Social History  She reports that she has quit smoking. Her smoking use included cigarettes. She has never used smokeless tobacco. She reports that she does not drink alcohol and does not use drugs.    Family History  Family History   Problem Relation Name Age of Onset    Breast cancer Maternal Grandmother      Colon cancer Neg Hx      Ovarian cancer Neg Hx      Uterine cancer Neg Hx          Allergies  Bee pollen    Review of Systems   Constitutional:  Positive for appetite change. Negative for chills and fever.   HENT: Negative.     Respiratory: Negative.     Cardiovascular: Negative.    Gastrointestinal:  Positive for abdominal pain, diarrhea, nausea and vomiting. Negative for abdominal distention and constipation.   Genitourinary: Negative.    Musculoskeletal: Negative.    Skin: Negative.    Neurological: Negative.    Psychiatric/Behavioral: Negative.        ROS: 12 systems reviewed and negative except per HPI above     Physical Exam by System:     Constitutional: Well developed, awake/alert/oriented x3, no distress, alert and cooperative   ENMT: mucous membranes moist   Head/Neck: Neck supple   Respiratory/Thorax: Patent airways, CTAB, normal breath sounds with good chest expansion, thorax symmetric   Cardiovascular: Regular, rate and rhythm, no murmurs, 2+ equal pulses of the extremities, normal S 1and S 2   Gastrointestinal: Nondistended, soft, tender upper right and left quadrants, no rebound tenderness or guarding, no masses palpable, no organomegaly, +BS, no bruits   Musculoskeletal: ROM intact, no joint swelling, normal strength   Extremities: normal extremities, no cyanosis edema, contusions or wounds, no clubbing   Neurological: alert and oriented x3, intact senses, motor,  "response and reflexes, normal strength       Last Recorded Vitals  Blood pressure 117/65, pulse 78, temperature 36.6 °C (97.9 °F), temperature source Temporal, resp. rate 20, height 1.626 m (5' 4\"), weight 108 kg (238 lb), SpO2 97%.    Relevant Results  Results for orders placed or performed during the hospital encounter of 01/07/25 (from the past 24 hours)   Urinalysis with Reflex Culture and Microscopic   Result Value Ref Range    Color, Urine Colorless (N) Light-Yellow, Yellow, Dark-Yellow    Appearance, Urine Clear Clear    Specific Gravity, Urine 1.006 1.005 - 1.035    pH, Urine 6.5 5.0, 5.5, 6.0, 6.5, 7.0, 7.5, 8.0    Protein, Urine NEGATIVE NEGATIVE, 10 (TRACE), 20 (TRACE) mg/dL    Glucose, Urine Normal Normal mg/dL    Blood, Urine NEGATIVE NEGATIVE    Ketones, Urine NEGATIVE NEGATIVE mg/dL    Bilirubin, Urine NEGATIVE NEGATIVE    Urobilinogen, Urine Normal Normal mg/dL    Nitrite, Urine NEGATIVE NEGATIVE    Leukocyte Esterase, Urine NEGATIVE NEGATIVE   CBC with Differential   Result Value Ref Range    WBC 10.0 4.4 - 11.3 x10*3/uL    nRBC 0.0 0.0 - 0.0 /100 WBCs    RBC 4.47 4.00 - 5.20 x10*6/uL    Hemoglobin 13.5 12.0 - 16.0 g/dL    Hematocrit 38.6 36.0 - 46.0 %    MCV 86 80 - 100 fL    MCH 30.2 26.0 - 34.0 pg    MCHC 35.0 32.0 - 36.0 g/dL    RDW 14.0 11.5 - 14.5 %    Platelets 337 150 - 450 x10*3/uL    Neutrophils % 58.4 40.0 - 80.0 %    Immature Granulocytes %, Automated 0.5 0.0 - 0.9 %    Lymphocytes % 34.4 13.0 - 44.0 %    Monocytes % 3.4 2.0 - 10.0 %    Eosinophils % 2.8 0.0 - 6.0 %    Basophils % 0.5 0.0 - 2.0 %    Neutrophils Absolute 5.81 1.20 - 7.70 x10*3/uL    Immature Granulocytes Absolute, Automated 0.05 0.00 - 0.70 x10*3/uL    Lymphocytes Absolute 3.43 1.20 - 4.80 x10*3/uL    Monocytes Absolute 0.34 0.10 - 1.00 x10*3/uL    Eosinophils Absolute 0.28 0.00 - 0.70 x10*3/uL    Basophils Absolute 0.05 0.00 - 0.10 x10*3/uL   Comprehensive Metabolic Panel   Result Value Ref Range    Glucose 125 (H) 74 - " 99 mg/dL    Sodium 136 136 - 145 mmol/L    Potassium 3.5 3.5 - 5.3 mmol/L    Chloride 103 98 - 107 mmol/L    Bicarbonate 25 21 - 32 mmol/L    Anion Gap 12 10 - 20 mmol/L    Urea Nitrogen 23 6 - 23 mg/dL    Creatinine 1.35 (H) 0.50 - 1.05 mg/dL    eGFR 49 (L) >60 mL/min/1.73m*2    Calcium 8.0 (L) 8.6 - 10.3 mg/dL    Albumin 2.9 (L) 3.4 - 5.0 g/dL    Alkaline Phosphatase 330 (H) 33 - 110 U/L    Total Protein 5.6 (L) 6.4 - 8.2 g/dL     (H) 9 - 39 U/L    Bilirubin, Total 4.1 (H) 0.0 - 1.2 mg/dL     (H) 7 - 45 U/L   Lipase   Result Value Ref Range    Lipase 875 (H) 9 - 82 U/L   Troponin I, High Sensitivity, Initial   Result Value Ref Range    Troponin I, High Sensitivity 3 0 - 13 ng/L   Troponin, High Sensitivity, 1 Hour   Result Value Ref Range    Troponin I, High Sensitivity 3 0 - 13 ng/L      Scheduled medications  HYDROmorphone, 0.5 mg, intravenous, Once  ketorolac, 15 mg, intravenous, Once      Continuous medications     PRN medications       CT angio chest abdomen pelvis    Result Date: 1/7/2025  Interpreted By:  Balta Singh and Liller Gregory STUDY: CT ANGIO CHEST ABDOMEN PELVIS;  1/7/2025 4:23 pm   INDICATION: Signs/Symptoms:Bilateral flank pain, left greater than right with pain in her left shoulder blade, lower abdomen.  Rule out dissection. Assess for kidney stones..   COMPARISON: None.   ACCESSION NUMBER(S): [id number]   ORDERING CLINICIAN: MARY IBARRA   TECHNIQUE: CT abdomen pelvis 11/16/2024. CT angio chest abdomen pelvis 08/26/2024.   Axial CT images of the chest, abdomen and pelvis were obtained after the intravenous administration of 90 ML mL Omnipaque 350 using angiographic technique with coronal and sagittal reformatted images. MIP images and 3D reconstructions were created on an independent workstation and reviewed.   FINDINGS: VASCULATURE:   PULMONARY ARTERIES: Normal caliber. No acute pulmonary embolism.   THORACIC AORTA: Non-contrast images show no evidence of acute  intramural hematoma. No thoracic aortic aneurysm or dissection. No significant thoracic aortic atherosclerosis.   ABDOMINAL AORTA: No abdominal aortic aneurysm or dissection. No significant abdominal aortic atherosclerosis.   ABDOMINAL AND PELVIC ARTERIES:  No hemodynamically significant stenosis or occlusion.     CT CHEST:   MEDIASTINUM AND LYMPH NODES:  No enlarged intrathoracic or axillary lymph nodes. No pneumomediastinum. Thyroid gland is unremarkable. Esophagus is mildly patulous but otherwise unremarkable.   HEART: Normal size.  No coronary artery calcifications. No significant pericardial effusion.   LUNG, PLEURA, LARGE AIRWAYS: Large airways are widely patent. No consolidation, pulmonary edema, pleural effusion or pneumothorax.   OSSEOUS STRUCTURES: No acute osseous abnormality.   CHEST WALL SOFT TISSUES: No discernible abnormality.     CT ABDOMEN/PELVIS:   ABDOMINAL WALL: Small fat containing umbilical hernia.   LIVER: No significant parenchymal abnormality.   BILE DUCTS: No significant intrahepatic or extrahepatic dilatation.   GALLBLADDER: Surgically absent.   PANCREAS: No significant abnormality.   SPLEEN: No significant abnormality.   ADRENALS: No significant abnormality.   KIDNEYS, URETERS, BLADDER: The bilateral kidneys enhance symmetrically. There is no hydroureteronephrosis or obstructing radiopaque stone. Unchanged 2 mm nonobstructing stone within the interpolar region of the left kidney. The bladder is distended but otherwise unremarkable.   REPRODUCTIVE ORGANS: No significant abnormality.   VESSELS: (See above). Multiple phleboliths again noted within the pelvis.   RETROPERITONEUM/LYMPH NODES: No mesenteric or retroperitoneal lymphadenopathy by CT size criteria.   BOWEL/MESENTERY/PERITONEUM: The stomach is unremarkable. The small bowel is of normal caliber without dilation. There is mild wall thickening of the rectum and sigmoid colon without significant surrounding fat stranding. However, there  is mild hyperemia. The transverse colon, ascending colon, and cecum are unremarkable. Normal appendix.   No significant ascites, free air, or fluid collection.     OSSEOUS STRUCTURES: No suspicious osseous lesion or acute osseous injury.       1. No acute vascular pathology within the chest, abdomen, or pelvis. 2. Mild thickening of the rectum and sigmoid colon with mild surrounding hyperemia can be seen in the setting of proctocolitis in the appropriate clinical setting. No additional acute pathology within the chest, abdomen, or pelvis. 3. Redemonstration of nonobstructing stone within the interpolar region of the left kidney measuring 2 mm, similar to prior exam.   I personally reviewed the images/study and I agree with the findings as stated above by resident physician, Dr. Adria Swann.   MACRO: none   Signed by: Balta Singh 1/7/2025 4:44 PM Dictation workstation:   [id number]         Assessment/Plan     Non obstructing kidney stone, history of kidney stones  EZE  Likely passed gallstone, history of shawn  Pancreatitis  Transaminitis  Hyperbilirubinuria   Nausea  History of anxiety      Plan:    -Admit to observation  -CT  Redemonstration of nonobstructing stone within the interpolar region of the left kidney measuring 2 mm.  Mild thickening of the rectum and sigmoid colon with mild  surrounding hyperemia can be seen in the setting of proctocolitis   -will order MRCP  -Lipase 875 received 1000 ml LR in ED  -Cont LR at 100 ml hour  -Cont Dilaudid PRN for moderate and severe pain   -Zofran PRN for nausea and vomiting  -NPO except meds  -Bili 4.1  -Alkaline phos 330, , , trend with am labs   -WBC 10.0, lactate pending  -urinalysis no signs of acute infection  -resume patient medications   -Am labs including cbc, cmp, mag  -dispo-likely require less than 2 midnight stays to adequately treat and safe dc plan  -POC discussed with patient and attending    Code status Full Code      I spent  >50 minutes in the professional and overall care of this patient.    SIGNATURE: ANDREA Mcwilliams PATIENT NAME: Tor Patino   DATE: January 7, 2025 MRN: [id number]   TIME: 6:54 PM

## 2025-01-07 NOTE — ED PROVIDER NOTES
EMERGENCY DEPARTMENT ENCOUNTER      Pt Name: Tor Patino  MRN: [id number]  Birthdate 1975  Date of evaluation: 1/7/2025  Provider: Vishal Seaman DO    CHIEF COMPLAINT       Chief Complaint   Patient presents with    Back Pain    Abdominal Pain     Hx of kidney stones, sent from nephrologist's office.  Was just started oh HCTZ         HISTORY OF PRESENT ILLNESS    49-year-old female comes from kidney stones, comes emergency room for left flank pain and right flank pain, along with pain radiation to the left lower quadrant onset last night.  Started with some nausea pain is gone progressively worse.  Has seen a urologist who sent her to a nephrologist.  She was recently started on HCTZ.  She states this feels exactly like a kidney stone.  She has had some vomiting as well.  No other symptoms currently.  She says however the pain does radiate up to her left shoulder blade as well.      History provided by:  Patient      Nursing Notes were reviewed.    PAST MEDICAL HISTORY     Past Medical History:   Diagnosis Date    Endometriosis     Pulmonary embolism          SURGICAL HISTORY       Past Surgical History:   Procedure Laterality Date    LAPAROSCOPIC HYSTERECTOMY           CURRENT MEDICATIONS       Previous Medications    ESTRADIOL (VAGIFEM) 10 MCG TABLET VAGINAL TABLET    Insert 1 tablet (10 mcg) into the vagina once daily at bedtime. Insert 1 tablet into vagina at bedtime for 2 weeks, then at bedtime twice a week.    HYDROCHLOROTHIAZIDE (HYDRODIURIL) 25 MG TABLET    Take 1 tablet (25 mg) by mouth once daily.       ALLERGIES     Bee pollen    FAMILY HISTORY       Family History   Problem Relation Name Age of Onset    Breast cancer Maternal Grandmother      Colon cancer Neg Hx      Ovarian cancer Neg Hx      Uterine cancer Neg Hx            SOCIAL HISTORY       Social History     Socioeconomic History    Marital status: Single   Tobacco Use    Smoking status: Former     Types: Cigarettes    Smokeless tobacco:  Never   Vaping Use    Vaping status: Never Used   Substance and Sexual Activity    Alcohol use: Never    Drug use: Never    Sexual activity: Defer     Social Drivers of Health     Financial Resource Strain: Not on File (2019)    Received from [facility]    Financial Resource Strain     Financial Resource Strain: 0   Food Insecurity: Not on File (2019)    Received from [facility]    Food Insecurity     Food: 0   Transportation Needs: Not on File (2019)    Received from [facility]    Transportation Needs     Transportation: 0   Physical Activity: Not on File (2019)    Received from [facility]    Physical Activity     Physical Activity: 0   Stress: Not on File (2019)    Received from [facility]    Stress     Stress: 0   Social Connections: Not on File (2019)    Received from [facility]    Social Connections     Social Connections and Isolation: 0   Housing Stability: Not on File (2019)    Received from [facility]    Housing Stability     Housin       SCREENINGS                        PHYSICAL EXAM    (up to 7 for level 4, 8 or more for level 5)     ED Triage Vitals [25 1350]   Temperature Heart Rate Respirations BP   36.6 °C (97.9 °F) 83 20 136/79      Pulse Ox Temp Source Heart Rate Source Patient Position   100 % Temporal Monitor Sitting      BP Location FiO2 (%)     Right arm --       Physical Exam  Vitals and nursing note reviewed.   Constitutional:       General: She is not in acute distress.     Comments: Appears uncomfortable due to pain   HENT:      Head: Normocephalic and atraumatic.   Eyes:      General: No scleral icterus.        Right eye: No discharge.         Left eye: No discharge.      Conjunctiva/sclera: Conjunctivae normal.   Cardiovascular:      Rate and Rhythm: Normal rate and regular rhythm.      Pulses: Normal pulses.   Pulmonary:      Effort: Pulmonary effort is normal.   Abdominal:      General: Abdomen is flat.      Palpations: Abdomen  is soft.      Tenderness: There is abdominal tenderness in the left lower quadrant. There is left CVA tenderness. There is no right CVA tenderness, guarding or rebound.   Musculoskeletal:         General: No deformity.      Right lower leg: No edema.      Left lower leg: No edema.   Skin:     General: Skin is warm and dry.   Neurological:      Mental Status: She is alert and oriented to person, place, and time. Mental status is at baseline.   Psychiatric:         Mood and Affect: Mood normal.         Behavior: Behavior normal.          DIAGNOSTIC RESULTS     LABS:  Labs Reviewed   COMPREHENSIVE METABOLIC PANEL - Abnormal       Result Value    Glucose 125 (*)     Sodium 136      Potassium 3.5      Chloride 103      Bicarbonate 25      Anion Gap 12      Urea Nitrogen 23      Creatinine 1.35 (*)     eGFR 49 (*)     Calcium 8.0 (*)     Albumin 2.9 (*)     Alkaline Phosphatase 330 (*)     Total Protein 5.6 (*)      (*)     Bilirubin, Total 4.1 (*)      (*)    URINALYSIS WITH REFLEX CULTURE AND MICROSCOPIC - Abnormal    Color, Urine Colorless (*)     Appearance, Urine Clear      Specific Gravity, Urine 1.006      pH, Urine 6.5      Protein, Urine NEGATIVE      Glucose, Urine Normal      Blood, Urine NEGATIVE      Ketones, Urine NEGATIVE      Bilirubin, Urine NEGATIVE      Urobilinogen, Urine Normal      Nitrite, Urine NEGATIVE      Leukocyte Esterase, Urine NEGATIVE     LIPASE - Abnormal    Lipase 875 (*)     Narrative:     Venipuncture immediately after or during the administration of Metamizole may lead to falsely low results. Testing should be performed immediately prior to Metamizole dosing.   SERIAL TROPONIN-INITIAL - Normal    Troponin I, High Sensitivity 3      Narrative:     Less than 99th percentile of normal range cutoff-  Female and children under 18 years old <14 ng/L; Male <21 ng/L: Negative  Repeat testing should be performed if clinically indicated.     Female and children under 18 years old  14-50 ng/L; Male 21-50 ng/L:  Consistent with possible cardiac damage and possible increased clinical   risk. Serial measurements may help to assess extent of myocardial damage.     >50 ng/L: Consistent with cardiac damage, increased clinical risk and  myocardial infarction. Serial measurements may help assess extent of   myocardial damage.      NOTE: Children less than 1 year old may have higher baseline troponin   levels and results should be interpreted in conjunction with the overall   clinical context.     NOTE: Troponin I testing is performed using a different   testing methodology at [facility] than at other   [facility]. Direct result comparisons should only   be made within the same method.   SERIAL TROPONIN, 1 HOUR - Normal    Troponin I, High Sensitivity 3      Narrative:     Less than 99th percentile of normal range cutoff-  Female and children under 18 years old <14 ng/L; Male <21 ng/L: Negative  Repeat testing should be performed if clinically indicated.     Female and children under 18 years old 14-50 ng/L; Male 21-50 ng/L:  Consistent with possible cardiac damage and possible increased clinical   risk. Serial measurements may help to assess extent of myocardial damage.     >50 ng/L: Consistent with cardiac damage, increased clinical risk and  myocardial infarction. Serial measurements may help assess extent of   myocardial damage.      NOTE: Children less than 1 year old may have higher baseline troponin   levels and results should be interpreted in conjunction with the overall   clinical context.     NOTE: Troponin I testing is performed using a different   testing methodology at [facility] than at other   [facility]. Direct result comparisons should only   be made within the same method.   CBC WITH AUTO DIFFERENTIAL    WBC 10.0      nRBC 0.0      RBC 4.47      Hemoglobin 13.5      Hematocrit 38.6      MCV 86      MCH 30.2      MCHC 35.0      RDW 14.0       Platelets 337      Neutrophils % 58.4      Immature Granulocytes %, Automated 0.5      Lymphocytes % 34.4      Monocytes % 3.4      Eosinophils % 2.8      Basophils % 0.5      Neutrophils Absolute 5.81      Immature Granulocytes Absolute, Automated 0.05      Lymphocytes Absolute 3.43      Monocytes Absolute 0.34      Eosinophils Absolute 0.28      Basophils Absolute 0.05     TROPONIN SERIES- (INITIAL, 1 HR)    Narrative:     The following orders were created for panel order Troponin I Series, High Sensitivity (0, 1 HR).  Procedure                               Abnormality         Status                     ---------                               -----------         ------                     Troponin I, High Sensiti...[[id number]]  Normal              Final result               Troponin, High Sensitivi...[[id number]]  Normal              Final result                 Please view results for these tests on the individual orders.   URINALYSIS WITH REFLEX CULTURE AND MICROSCOPIC    Narrative:     The following orders were created for panel order Urinalysis with Reflex Culture and Microscopic.  Procedure                               Abnormality         Status                     ---------                               -----------         ------                     Urinalysis with Reflex C...[[id number]]  Abnormal            Final result               Extra Urine Gray Tube[[id number]]                            In process                   Please view results for these tests on the individual orders.   EXTRA URINE GRAY TUBE   LACTATE       All other labs were within normal range or not returned as of this dictation.    Imaging  CT angio chest abdomen pelvis   Final Result   1. No acute vascular pathology within the chest, abdomen, or pelvis.   2. Mild thickening of the rectum and sigmoid colon with mild   surrounding hyperemia can be seen in the setting of proctocolitis in   the appropriate clinical setting. No additional  acute pathology   within the chest, abdomen, or pelvis.   3. Redemonstration of nonobstructing stone within the interpolar   region of the left kidney measuring 2 mm, similar to prior exam.        I personally reviewed the images/study and I agree with the findings   as stated above by resident physician, Dr. Adria Swann.        MACRO:   none        Signed by: Balta Singh 1/7/2025 4:44 PM   Dictation workstation:   [id number]           Procedures  Procedures     EMERGENCY DEPARTMENT COURSE/MDM:     ED Course as of 01/07/25 1903   Tue Jan 07, 2025 1900 Independently interpreted EKG shows sinus rhythm 78 bpm, QTc 440, no acute injury pattern [RD]      ED Course User Index  [RD] Vishal Seaman,          Diagnoses as of 01/07/25 1903   Acute biliary pancreatitis without infection or necrosis (HHS-HCC)   Elevated bilirubin   Transaminitis        Medical Decision Making  49-year-old female comes emergency room with abdominal pain and back pain, differentials for obstructing kidney stone, pancreatitis, she is having pain above and below the diaphragm and said her pain is sharp to her left shoulder so I did order a CTA chest abdomen and pelvis to rule out possible aortic dissection.  Other differentials for ACS, pneumonia, pneumothorax, as well.    On my independent review of labs, CBC is normal white count, hemoglobin and platelets, chemistry does show acute kidney injury, treated with a 1 L IV bolus of lactated Ringer's.  Chemistry also shows transaminitis, elevated bilirubin suggestive obstructive biliary pathology.  Urinalysis negative, troponins are negative, EKG shows sinus rhythm 78 bpm, QTc 440, no acute finding.  Lipase level is elevated suggestive of pancreatitis, CT does not show definitive acute pancreatitis however she is having abdominal pain and back pain, elevated lipase levels clinically does have pancreatitis.  No signs of pneumonia, pneumothorax on CT.  High suspicion for biliary  pancreatitis at this time given transaminase and bilirubin elevation along with lipase level elevation.  Did order a lactic level as well.    Patient will be admitted for an MRCP.  Patient was given 4 mg IV morphine, 9 7 5 mg oral Tylenol, 4 mg IV Zofran x 2, half a milligram of Dilaudid x 2 along with 50 mg IV Toradol for symptomatic pain relief.  She was admitted to the hospital for further management.        Patient and or family in agreement and understanding of treatment plan.  All questions answered.      I reviewed the case with the attending ED physician. The attending ED physician agrees with the plan. Patient and/or patient´s representative was counseled regarding labs, imaging, likely diagnosis, and plan. All questions were answered.    ED Medications administered this visit:    Medications   HYDROmorphone (Dilaudid) injection 0.5 mg (has no administration in time range)   ketorolac (Toradol) injection 15 mg (has no administration in time range)   ondansetron (Zofran) injection 4 mg (4 mg intravenous Given 1/7/25 1423)   morphine injection 4 mg (4 mg intravenous Given 1/7/25 1423)   acetaminophen (Tylenol) tablet 975 mg (975 mg oral Given 1/7/25 1519)   iohexol (OMNIPaque) 350 mg iodine/mL solution 90 mL (90 mL intravenous Given 1/7/25 1604)   HYDROmorphone (Dilaudid) injection 0.5 mg (0.5 mg intravenous Given 1/7/25 1646)   ondansetron (Zofran) injection 4 mg (4 mg intravenous Given 1/7/25 1645)   lactated Ringer's bolus 1,000 mL (0 mL intravenous Stopped 1/7/25 1819)       Final Impression:   1. Acute biliary pancreatitis without infection or necrosis (HHS-HCC)    2. Elevated bilirubin    3. Transaminitis          (Please note that portions of this note were completed with a voice recognition program.  Efforts were made to edit the dictations but occasionally words are mis-transcribed.)     Vishal Seaman DO  Resident  01/07/25 1903       Vishal Seaman DO  Resident  01/07/25 1903

## 2025-01-07 NOTE — TELEPHONE ENCOUNTER
----- Message from Mauricio Bills sent at 1/6/2025  9:54 AM EST -----  24-hour urine volume is just 1.8 L.  It means she is not drinking enough water.  This puts her at high risk for new stone formation.  To minimize the risk of stone formation she needs to drink enough water to make at least 2 to 2.5 L of urine per day.  There is also also shows hypercalciuria.  I added hydrochlorothiazide to her medications.

## 2025-01-07 NOTE — PROGRESS NOTES
Tor Patino   49 y.o.    @@  N/Room: [id number]/Room/bed info not found    Subjective:   The patient is being seen for a routine clinic follow-up of chronic kidney disease. Recently, the disease has been stable. Disease complications:  No hyperkalemia, no hypocalcemia, no hyperphosphatemia, no metabolic acidosis, no coagulopathy, no uremic encephalopathy, no neuropathy and no renal osteodystrophy. The patient is currently asymptomatic. No associated symptoms are reported.       Meds:   Current Outpatient Medications   Medication Sig Dispense Refill    estradiol (Vagifem) 10 mcg tablet vaginal tablet Insert 1 tablet (10 mcg) into the vagina once daily at bedtime. Insert 1 tablet into vagina at bedtime for 2 weeks, then at bedtime twice a week. 18 tablet 3    hydroCHLOROthiazide (HYDRODiuril) 25 mg tablet Take 1 tablet (25 mg) by mouth once daily. 90 tablet 3     No current facility-administered medications for this visit.              Physical Examination:        Vitals:    01/07/25 1310   BP: 114/78   Pulse: 73     General: The patient is awake, oriented, and is not in any distress.  Head and Neck: Normocephalic. No periorbital edema.  Eyes: non-icteric  Respiratory: Symmetric chest expansion. No respiratory distress.  Skin: No maculopapular rash.  Musculoskeletal: No peripheral edema.  Neuro Exam: Speech is fluent. Moves extremities.    Imaging:  === 10/22/24 ===    US RENAL COMPLETE    - Impression -  Unremarkable sonographic appearance of the urinary bladder and right  kidney.    Possible 7 mm left renal calculus. There is no hydronephrosis.      MACRO:  None    Signed by: Ivan Anderson 10/23/2024 5:59 PM  Dictation workstation:   [id number]       Blood Labs:  No results found for this or any previous visit (from the past 24 hours).   Lab Results   Component Value Date    PTH 44.5 12/04/2024    PROTUR NEGATIVE 11/15/2024    PROTUR 30 (1+) 10/07/2024      Lab Results   Component Value Date    GLUCOSE  94 11/15/2024    CALCIUM 9.1 11/15/2024     11/15/2024    K 4.0 11/15/2024    CO2 23 11/15/2024     11/15/2024    BUN 10 11/15/2024    CREATININE 0.69 11/15/2024         Assessment and Plan:  #1 nephrolithiasis.  The patient has history of multiple kidney stones and she says she has passed several stones.  Her 24-hour urine collection shows hypercalciuria.  I put her on hydrochlorothiazide.  She also had just 1.8 L of urine in 24 hours.  We discussed about importance of water intake.  Normal PTH and uric acid level.  The patient is experiencing pain because of the existing stones.  I advised her to get in touch with her urologist.  24-hour urine collection will be repeated in about 6 months.    I will see her in about 6 months for follow-up.        Mauricio Bills MD  Senior Attending Physician  Director of Onco-Nephrology Program  Division of Nephrology & Hypertension  [facility]

## 2025-01-08 ENCOUNTER — APPOINTMENT (OUTPATIENT)
Dept: RADIOLOGY | Facility: HOSPITAL | Age: 50
End: 2025-01-08
Payer: COMMERCIAL

## 2025-01-08 PROBLEM — E66.01 CLASS 3 SEVERE OBESITY DUE TO EXCESS CALORIES WITHOUT SERIOUS COMORBIDITY WITH BODY MASS INDEX (BMI) OF 40.0 TO 44.9 IN ADULT: Status: ACTIVE | Noted: 2025-01-08

## 2025-01-08 PROBLEM — E66.813 CLASS 3 SEVERE OBESITY DUE TO EXCESS CALORIES WITHOUT SERIOUS COMORBIDITY WITH BODY MASS INDEX (BMI) OF 40.0 TO 44.9 IN ADULT: Status: ACTIVE | Noted: 2025-01-08

## 2025-01-08 LAB
ALBUMIN SERPL BCP-MCNC: 4.1 G/DL (ref 3.4–5)
ALP SERPL-CCNC: 41 U/L (ref 33–110)
ALT SERPL W P-5'-P-CCNC: 26 U/L (ref 7–45)
ANION GAP SERPL CALC-SCNC: 13 MMOL/L (ref 10–20)
AST SERPL W P-5'-P-CCNC: 22 U/L (ref 9–39)
ATRIAL RATE: 78 BPM
BILIRUB SERPL-MCNC: 1.3 MG/DL (ref 0–1.2)
BUN SERPL-MCNC: 10 MG/DL (ref 6–23)
CALCIUM SERPL-MCNC: 9 MG/DL (ref 8.6–10.3)
CHLORIDE SERPL-SCNC: 104 MMOL/L (ref 98–107)
CO2 SERPL-SCNC: 28 MMOL/L (ref 21–32)
CREAT SERPL-MCNC: 0.8 MG/DL (ref 0.5–1.05)
EGFRCR SERPLBLD CKD-EPI 2021: >90 ML/MIN/1.73M*2
ERYTHROCYTE [DISTWIDTH] IN BLOOD BY AUTOMATED COUNT: 13.8 % (ref 11.5–14.5)
GLUCOSE SERPL-MCNC: 95 MG/DL (ref 74–99)
HCT VFR BLD AUTO: 34.7 % (ref 36–46)
HGB BLD-MCNC: 12 G/DL (ref 12–16)
HOLD SPECIMEN: NORMAL
MAGNESIUM SERPL-MCNC: 2 MG/DL (ref 1.6–2.4)
MCH RBC QN AUTO: 30.4 PG (ref 26–34)
MCHC RBC AUTO-ENTMCNC: 34.6 G/DL (ref 32–36)
MCV RBC AUTO: 88 FL (ref 80–100)
NRBC BLD-RTO: 0 /100 WBCS (ref 0–0)
P AXIS: 57 DEGREES
P OFFSET: 194 MS
P ONSET: 135 MS
PLATELET # BLD AUTO: 277 X10*3/UL (ref 150–450)
POTASSIUM SERPL-SCNC: 3.6 MMOL/L (ref 3.5–5.3)
PR INTERVAL: 172 MS
PROT SERPL-MCNC: 6.1 G/DL (ref 6.4–8.2)
Q ONSET: 221 MS
QRS COUNT: 13 BEATS
QRS DURATION: 90 MS
QT INTERVAL: 386 MS
QTC CALCULATION(BAZETT): 440 MS
QTC FREDERICIA: 421 MS
R AXIS: 2 DEGREES
RBC # BLD AUTO: 3.95 X10*6/UL (ref 4–5.2)
SODIUM SERPL-SCNC: 141 MMOL/L (ref 136–145)
T AXIS: 43 DEGREES
T OFFSET: 414 MS
VENTRICULAR RATE: 78 BPM
WBC # BLD AUTO: 7.2 X10*3/UL (ref 4.4–11.3)

## 2025-01-08 PROCEDURE — 2500000004 HC RX 250 GENERAL PHARMACY W/ HCPCS (ALT 636 FOR OP/ED)

## 2025-01-08 PROCEDURE — 85027 COMPLETE CBC AUTOMATED: CPT | Performed by: NURSE PRACTITIONER

## 2025-01-08 PROCEDURE — 96375 TX/PRO/DX INJ NEW DRUG ADDON: CPT

## 2025-01-08 PROCEDURE — 80053 COMPREHEN METABOLIC PANEL: CPT | Performed by: NURSE PRACTITIONER

## 2025-01-08 PROCEDURE — 74181 MRI ABDOMEN W/O CONTRAST: CPT | Performed by: RADIOLOGY

## 2025-01-08 PROCEDURE — 2500000004 HC RX 250 GENERAL PHARMACY W/ HCPCS (ALT 636 FOR OP/ED): Performed by: NURSE PRACTITIONER

## 2025-01-08 PROCEDURE — 96361 HYDRATE IV INFUSION ADD-ON: CPT

## 2025-01-08 PROCEDURE — 2500000004 HC RX 250 GENERAL PHARMACY W/ HCPCS (ALT 636 FOR OP/ED): Performed by: INTERNAL MEDICINE

## 2025-01-08 PROCEDURE — 36415 COLL VENOUS BLD VENIPUNCTURE: CPT | Performed by: NURSE PRACTITIONER

## 2025-01-08 PROCEDURE — 83735 ASSAY OF MAGNESIUM: CPT | Performed by: NURSE PRACTITIONER

## 2025-01-08 PROCEDURE — 96372 THER/PROPH/DIAG INJ SC/IM: CPT | Performed by: NURSE PRACTITIONER

## 2025-01-08 PROCEDURE — 76377 3D RENDER W/INTRP POSTPROCES: CPT | Performed by: RADIOLOGY

## 2025-01-08 PROCEDURE — 96365 THER/PROPH/DIAG IV INF INIT: CPT

## 2025-01-08 PROCEDURE — 96376 TX/PRO/DX INJ SAME DRUG ADON: CPT

## 2025-01-08 PROCEDURE — G0378 HOSPITAL OBSERVATION PER HR: HCPCS

## 2025-01-08 PROCEDURE — 2500000001 HC RX 250 WO HCPCS SELF ADMINISTERED DRUGS (ALT 637 FOR MEDICARE OP): Performed by: INTERNAL MEDICINE

## 2025-01-08 PROCEDURE — 99232 SBSQ HOSP IP/OBS MODERATE 35: CPT | Performed by: INTERNAL MEDICINE

## 2025-01-08 PROCEDURE — 80321 ALCOHOLS BIOMARKERS 1OR 2: CPT | Performed by: NURSE PRACTITIONER

## 2025-01-08 PROCEDURE — 74181 MRI ABDOMEN W/O CONTRAST: CPT

## 2025-01-08 RX ORDER — PROCHLORPERAZINE EDISYLATE 5 MG/ML
5 INJECTION INTRAMUSCULAR; INTRAVENOUS EVERY 6 HOURS PRN
Status: DISCONTINUED | OUTPATIENT
Start: 2025-01-08 | End: 2025-01-09 | Stop reason: HOSPADM

## 2025-01-08 RX ORDER — ACETAMINOPHEN 325 MG/1
975 TABLET ORAL EVERY 8 HOURS PRN
Status: DISCONTINUED | OUTPATIENT
Start: 2025-01-08 | End: 2025-01-09 | Stop reason: HOSPADM

## 2025-01-08 RX ORDER — ONDANSETRON 4 MG/1
4 TABLET, FILM COATED ORAL EVERY 6 HOURS PRN
Status: DISCONTINUED | OUTPATIENT
Start: 2025-01-08 | End: 2025-01-08

## 2025-01-08 RX ORDER — ONDANSETRON HYDROCHLORIDE 2 MG/ML
4 INJECTION, SOLUTION INTRAVENOUS EVERY 6 HOURS PRN
Status: DISCONTINUED | OUTPATIENT
Start: 2025-01-08 | End: 2025-01-08

## 2025-01-08 RX ORDER — SODIUM CHLORIDE, SODIUM LACTATE, POTASSIUM CHLORIDE, CALCIUM CHLORIDE 600; 310; 30; 20 MG/100ML; MG/100ML; MG/100ML; MG/100ML
200 INJECTION, SOLUTION INTRAVENOUS CONTINUOUS
Status: DISCONTINUED | OUTPATIENT
Start: 2025-01-08 | End: 2025-01-09

## 2025-01-08 RX ORDER — ACETAMINOPHEN 500 MG
5 TABLET ORAL NIGHTLY PRN
Status: DISCONTINUED | OUTPATIENT
Start: 2025-01-08 | End: 2025-01-09 | Stop reason: HOSPADM

## 2025-01-08 RX ADMIN — HYDROMORPHONE HYDROCHLORIDE 0.5 MG: 1 INJECTION, SOLUTION INTRAMUSCULAR; INTRAVENOUS; SUBCUTANEOUS at 15:25

## 2025-01-08 RX ADMIN — HEPARIN SODIUM 7500 UNITS: 5000 INJECTION, SOLUTION INTRAVENOUS; SUBCUTANEOUS at 05:18

## 2025-01-08 RX ADMIN — SODIUM CHLORIDE, POTASSIUM CHLORIDE, SODIUM LACTATE AND CALCIUM CHLORIDE 100 ML/HR: 600; 310; 30; 20 INJECTION, SOLUTION INTRAVENOUS at 08:50

## 2025-01-08 RX ADMIN — HYDROMORPHONE HYDROCHLORIDE 0.5 MG: 1 INJECTION, SOLUTION INTRAMUSCULAR; INTRAVENOUS; SUBCUTANEOUS at 22:29

## 2025-01-08 RX ADMIN — HYDROMORPHONE HYDROCHLORIDE 0.5 MG: 1 INJECTION, SOLUTION INTRAMUSCULAR; INTRAVENOUS; SUBCUTANEOUS at 05:17

## 2025-01-08 RX ADMIN — LORAZEPAM 1 MG: 2 INJECTION INTRAMUSCULAR; INTRAVENOUS at 12:18

## 2025-01-08 RX ADMIN — Medication 5 MG: at 22:30

## 2025-01-08 RX ADMIN — SODIUM CHLORIDE, POTASSIUM CHLORIDE, SODIUM LACTATE AND CALCIUM CHLORIDE 200 ML/HR: 600; 310; 30; 20 INJECTION, SOLUTION INTRAVENOUS at 17:18

## 2025-01-08 RX ADMIN — PROMETHAZINE HYDROCHLORIDE 12.5 MG: 25 INJECTION INTRAMUSCULAR; INTRAVENOUS at 02:53

## 2025-01-08 RX ADMIN — HYDROMORPHONE HYDROCHLORIDE 0.5 MG: 1 INJECTION, SOLUTION INTRAMUSCULAR; INTRAVENOUS; SUBCUTANEOUS at 18:45

## 2025-01-08 RX ADMIN — HYDROMORPHONE HYDROCHLORIDE 0.5 MG: 1 INJECTION, SOLUTION INTRAMUSCULAR; INTRAVENOUS; SUBCUTANEOUS at 08:47

## 2025-01-08 RX ADMIN — PROCHLORPERAZINE EDISYLATE 5 MG: 5 INJECTION INTRAMUSCULAR; INTRAVENOUS at 22:29

## 2025-01-08 RX ADMIN — SODIUM CHLORIDE, POTASSIUM CHLORIDE, SODIUM LACTATE AND CALCIUM CHLORIDE 200 ML/HR: 600; 310; 30; 20 INJECTION, SOLUTION INTRAVENOUS at 14:12

## 2025-01-08 RX ADMIN — PANTOPRAZOLE SODIUM 40 MG: 40 INJECTION, POWDER, FOR SOLUTION INTRAVENOUS at 06:21

## 2025-01-08 RX ADMIN — HEPARIN SODIUM 7500 UNITS: 5000 INJECTION, SOLUTION INTRAVENOUS; SUBCUTANEOUS at 14:14

## 2025-01-08 RX ADMIN — HYDROMORPHONE HYDROCHLORIDE 0.5 MG: 1 INJECTION, SOLUTION INTRAMUSCULAR; INTRAVENOUS; SUBCUTANEOUS at 11:57

## 2025-01-08 RX ADMIN — HYDROMORPHONE HYDROCHLORIDE 0.5 MG: 1 INJECTION, SOLUTION INTRAMUSCULAR; INTRAVENOUS; SUBCUTANEOUS at 02:06

## 2025-01-08 RX ADMIN — HEPARIN SODIUM 7500 UNITS: 5000 INJECTION, SOLUTION INTRAVENOUS; SUBCUTANEOUS at 22:29

## 2025-01-08 SDOH — SOCIAL STABILITY: SOCIAL INSECURITY
WITHIN THE LAST YEAR, HAVE YOU BEEN RAPED OR FORCED TO HAVE ANY KIND OF SEXUAL ACTIVITY BY YOUR PARTNER OR EX-PARTNER?: NO

## 2025-01-08 SDOH — ECONOMIC STABILITY: FOOD INSECURITY: WITHIN THE PAST 12 MONTHS, YOU WORRIED THAT YOUR FOOD WOULD RUN OUT BEFORE YOU GOT THE MONEY TO BUY MORE.: NEVER TRUE

## 2025-01-08 SDOH — SOCIAL STABILITY: SOCIAL INSECURITY: WITHIN THE LAST YEAR, HAVE YOU BEEN HUMILIATED OR EMOTIONALLY ABUSED IN OTHER WAYS BY YOUR PARTNER OR EX-PARTNER?: NO

## 2025-01-08 SDOH — SOCIAL STABILITY: SOCIAL INSECURITY
WITHIN THE LAST YEAR, HAVE YOU BEEN KICKED, HIT, SLAPPED, OR OTHERWISE PHYSICALLY HURT BY YOUR PARTNER OR EX-PARTNER?: NO

## 2025-01-08 SDOH — ECONOMIC STABILITY: FOOD INSECURITY: WITHIN THE PAST 12 MONTHS, THE FOOD YOU BOUGHT JUST DIDN'T LAST AND YOU DIDN'T HAVE MONEY TO GET MORE.: NEVER TRUE

## 2025-01-08 SDOH — ECONOMIC STABILITY: INCOME INSECURITY: IN THE PAST 12 MONTHS HAS THE ELECTRIC, GAS, OIL, OR WATER COMPANY THREATENED TO SHUT OFF SERVICES IN YOUR HOME?: NO

## 2025-01-08 SDOH — SOCIAL STABILITY: SOCIAL INSECURITY: WITHIN THE LAST YEAR, HAVE YOU BEEN AFRAID OF YOUR PARTNER OR EX-PARTNER?: NO

## 2025-01-08 ASSESSMENT — COGNITIVE AND FUNCTIONAL STATUS - GENERAL
DAILY ACTIVITIY SCORE: 24
MOBILITY SCORE: 24

## 2025-01-08 ASSESSMENT — PAIN DESCRIPTION - LOCATION
LOCATION: ABDOMEN

## 2025-01-08 ASSESSMENT — PAIN SCALES - GENERAL
PAINLEVEL_OUTOF10: 7
PAINLEVEL_OUTOF10: 8
PAINLEVEL_OUTOF10: 9
PAINLEVEL_OUTOF10: 8
PAINLEVEL_OUTOF10: 7
PAINLEVEL_OUTOF10: 3
PAINLEVEL_OUTOF10: 6
PAINLEVEL_OUTOF10: 8
PAINLEVEL_OUTOF10: 2

## 2025-01-08 ASSESSMENT — PAIN - FUNCTIONAL ASSESSMENT
PAIN_FUNCTIONAL_ASSESSMENT: 0-10
PAIN_FUNCTIONAL_ASSESSMENT: PAINAD (PAIN ASSESSMENT IN ADVANCED DEMENTIA SCALE)
PAIN_FUNCTIONAL_ASSESSMENT: 0-10

## 2025-01-08 ASSESSMENT — PAIN DESCRIPTION - ORIENTATION: ORIENTATION: UPPER

## 2025-01-08 NOTE — CARE PLAN
The patient's goals for the shift include      The clinical goals for the shift include pt will have pain controlled this shift    Goals progressing, safety maintained. Pt medicated for pain and nausea this night, NPO for MRCP today.

## 2025-01-08 NOTE — CONSULTS
Reason for consult  Elevated enzymes, pancreatitis    HPI  Tor Patino is a 49 y.o. female with PMH of PE/DVT (not on AC), cholecystectomy, HLD presenting with left flank pain that began a week and a half ago. Initially she noted cramping with nausea and diarrhea.  She was taking Tylenol at home as well as her Percocet for previous kidney stones with no improvement.  This pain was different than when she has had kidney stones in the past however.  At that time pain would be a stabbing sensation. So she thought she might have some type of GI virus.  Pain was alternating across her upper abdomen from left to right.  She did endorse some dysuria so, she did drink plenty of fluids in case it was a kidney stone.  She did contact her nephrologist who ordered a 24-hour urine noted hypercalciuria so she was placed on hydrochlorothiazide which she took 1 dose of.  She did see her nephrologist in the office on day of admission.  Leaving the appointment she did come to the ED due to significant abdominal pain and nausea. Pain radiating to left shoulder. Pain is currently a little better but is constant and increases with movement.  She continues to have nausea.  She endorses normally having regular bowel movement up to 3-4 times per day.  Of late, she has been having smaller bowel movements with last BM yesterday.  She denies alcohol history.  No previous pancreatitis.    Patient is HDS on RA.  Labs note no leukocytosis.  Lipase 875.  Liver enzymes noting alk phos 330-> 41, -> 26, -> 22, T. bili 4.1-> 1.3. Imaging obtained noting nonobstructing left kidney stone, no pancreatic abnormalities, no biliary dilation.  MRCP pending.    PMH  She has a past medical history of Endometriosis and Pulmonary embolism.  Nephrolithiasis, HLD    PSH  She has a past surgical history that includes Laparoscopic hysterectomy.  CCY ~2013    Family  Family History   Problem Relation Name Age of Onset    Breast cancer Maternal  "Grandmother      Colon cancer Neg Hx      Ovarian cancer Neg Hx      Uterine cancer Neg Hx         Social  She reports that she has quit smoking. Her smoking use included cigarettes. She has never used smokeless tobacco. She reports that she does not drink alcohol and does not use drugs.      Review of Systems  ROS negative unless stated otherwise in HPI     Objective  BP 99/57 (BP Location: Right arm, Patient Position: Lying)   Pulse 77   Temp 36.5 °C (97.7 °F) (Temporal)   Resp 18   Ht 1.626 m (5' 4\")   Wt 110 kg (241 lb 6.5 oz)   SpO2 93%   BMI 41.44 kg/m²     Physical Exam  Constitutional: Alert, pleasant and interactive, wincing in discomfort  Eyes: PERRL, sclera clear, no conjunctival injection  Skin: Warm and dry, no rash or ecchymosis  ENMT: Mucous membranes moist, no lesions noted  Resp: CTAB, even and unlabored  CV: RRR, normal S1, S2, no m,r,g  GI: +BS, soft, round, significant upper abdominal TTP, no rebound tenderness or guarding, no palpable masses or organomegaly  Extremities: Extremities warm, no edema, contusions, wounds or cyanosis  Neuro: Alert and oriented x3  Psych: Appropriate mood and behavior    Medications  Scheduled medications  heparin (porcine), 7,500 Units, subcutaneous, q8h TENZIN  pantoprazole, 40 mg, oral, Daily before breakfast   Or  pantoprazole, 40 mg, intravenous, Daily before breakfast      Continuous medications  lactated Ringer's, 100 mL/hr, Last Rate: 100 mL/hr (01/08/25 0619)      PRN medications  PRN medications: HYDROmorphone, HYDROmorphone, LORazepam, polyethylene glycol, promethazine     Labs  Lab Results   Component Value Date    WBC 7.2 01/08/2025    HGB 12.0 01/08/2025    HCT 34.7 (L) 01/08/2025    MCV 88 01/08/2025     01/08/2025     Lab Results   Component Value Date    GLUCOSE 95 01/08/2025    CALCIUM 9.0 01/08/2025     01/08/2025    K 3.6 01/08/2025    CO2 28 01/08/2025     01/08/2025    BUN 10 01/08/2025    CREATININE 0.80 01/08/2025     Lab " "Results   Component Value Date    ALT 26 01/08/2025    AST 22 01/08/2025    ALKPHOS 41 01/08/2025    BILITOT 1.3 (H) 01/08/2025     No results found for: \"IRON\", \"TIBC\", \"FERRITIN\"  Lab Results   Component Value Date    INR 0.9 08/26/2024    INR 0.9 09/05/2022    PROTIME 10.3 08/26/2024    PROTIME 10.9 09/05/2022       Radiology  CT angio chest A/P 1/7/2025 noting:  IMPRESSION:  1. No acute vascular pathology within the chest, abdomen, or pelvis.  2. Mild thickening of the rectum and sigmoid colon with mild  surrounding hyperemia can be seen in the setting of proctocolitis in  the appropriate clinical setting. No additional acute pathology  within the chest, abdomen, or pelvis.  3. Redemonstration of nonobstructing stone within the interpolar  region of the left kidney measuring 2 mm, similar to prior exam.      I personally reviewed the images/study and I agree with the findings  as stated above by resident physician, Dr. Adria Swann.    Signed by: Balta Singh 1/7/2025 4:44 PM  Dictation workstation:   [id number]    Assessment:  Tor Patino is a 49 y.o. female with PMH of PE/DVT, cholecystectomy, cholelithiasis, nephrolithiasis, HLD presenting with upper abdominal discomfort and nausea.  Lipase noted to be 875.  Liver enzymes noting alk phos 330-> 41, -> 26, -> 22, T. bili 4.1->1.3. Imaging obtained noting nonobstructing left kidney stone, no pancreatic abnormalities, no biliary dilation.  MRCP pending.    # acute pancreatitis  # transaminitis/ hyperbilirubinemia-gallstone pancreatitis v  autoimmune  # nausea  # upper abd discomfort  # proctocolitis on imaging     Plan:  - continue supportive care  - keep NPO until pain improves then can trial clears  - continue to trend liver enzymes daily  - increase LR to 200 ml/hr for today  - continue analgesics and antiemetics as needed  - follow up Peth and IgG4    Plan has been discussed with Dr. Spencer. GI will continue to follow.     Deborah " DAVID Grant/CNP

## 2025-01-08 NOTE — PROGRESS NOTES
Attempt to meet with patient at bedside. Medical team currently at bedside. Will attempt again at a later time.

## 2025-01-08 NOTE — PROGRESS NOTES
"Tor Patino is a 49 y.o. female on day 0 of admission presenting with Acute pancreatitis, unspecified complication status, unspecified pancreatitis type (HHS-HCC).    Subjective   The patient was admitted overnight, she was laying in bed noting that she really did not have any major change in her symptoms despite the improvement in her labs; she was seen and evaluated prior to the MRCP being completed.  She notes that she continues to have a little bit of upper quadrant tenderness bilaterally, she was having a little bit of nausea, but denied any fevers or chills.    Objective     Physical Exam  Constitutional: Well developed, awake/alert/oriented x3, no distress, alert and cooperative   ENMT: mucous membranes moist   Head/Neck: Neck supple   Respiratory/Thorax: Patent airways, CTAB, normal breath sounds with good chest expansion, thorax symmetric   Cardiovascular: Regular, rate and rhythm, no murmurs, 2+ equal pulses of the extremities, normal S 1and S 2   Gastrointestinal: Nondistended, soft, tender upper right and left quadrants, no rebound tenderness or guarding, no masses palpable, no organomegaly, +BS, no bruits   Musculoskeletal: ROM intact, no joint swelling, normal strength   Extremities: normal extremities, no cyanosis edema, contusions or wounds, no clubbing   Neurological: alert and oriented x3, intact senses, motor, response and reflexes, normal strength      Last Recorded Vitals  Blood pressure 100/59, pulse 72, temperature 37.5 °C (99.5 °F), resp. rate 17, height 1.626 m (5' 4\"), weight 110 kg (241 lb 6.5 oz), SpO2 94%.  Intake/Output last 3 Shifts:  I/O last 3 completed shifts:  In: 1798.3 (16.4 mL/kg) [I.V.:748.3 (6.8 mL/kg); IV Piggyback:1050]  Out: 500 (4.6 mL/kg) [Urine:500 (0.1 mL/kg/hr)]  Weight: 109.5 kg     Relevant Results  Scheduled medications  heparin (porcine), 7,500 Units, subcutaneous, q8h TENZIN  pantoprazole, 40 mg, oral, Daily before breakfast   Or  pantoprazole, 40 mg, " intravenous, Daily before breakfast      Continuous medications  lactated Ringer's, 200 mL/hr, Last Rate: 200 mL/hr (01/08/25 1516)      PRN medications  PRN medications: acetaminophen, HYDROmorphone, HYDROmorphone, polyethylene glycol, prochlorperazine    Results for orders placed or performed during the hospital encounter of 01/07/25 (from the past 24 hours)   Lactate   Result Value Ref Range    Lactate 1.2 0.4 - 2.0 mmol/L   CBC   Result Value Ref Range    WBC 7.2 4.4 - 11.3 x10*3/uL    nRBC 0.0 0.0 - 0.0 /100 WBCs    RBC 3.95 (L) 4.00 - 5.20 x10*6/uL    Hemoglobin 12.0 12.0 - 16.0 g/dL    Hematocrit 34.7 (L) 36.0 - 46.0 %    MCV 88 80 - 100 fL    MCH 30.4 26.0 - 34.0 pg    MCHC 34.6 32.0 - 36.0 g/dL    RDW 13.8 11.5 - 14.5 %    Platelets 277 150 - 450 x10*3/uL   Comprehensive metabolic panel   Result Value Ref Range    Glucose 95 74 - 99 mg/dL    Sodium 141 136 - 145 mmol/L    Potassium 3.6 3.5 - 5.3 mmol/L    Chloride 104 98 - 107 mmol/L    Bicarbonate 28 21 - 32 mmol/L    Anion Gap 13 10 - 20 mmol/L    Urea Nitrogen 10 6 - 23 mg/dL    Creatinine 0.80 0.50 - 1.05 mg/dL    eGFR >90 >60 mL/min/1.73m*2    Calcium 9.0 8.6 - 10.3 mg/dL    Albumin 4.1 3.4 - 5.0 g/dL    Alkaline Phosphatase 41 33 - 110 U/L    Total Protein 6.1 (L) 6.4 - 8.2 g/dL    AST 22 9 - 39 U/L    Bilirubin, Total 1.3 (H) 0.0 - 1.2 mg/dL    ALT 26 7 - 45 U/L   Magnesium   Result Value Ref Range    Magnesium 2.00 1.60 - 2.40 mg/dL     MRCP pancreas wo IV contrast    Result Date: 1/8/2025  Interpreted By:  Prosper Campos, STUDY: MRCP PANCREAS WO IV CONTRAST;  1/8/2025 1:19 pm   INDICATION: Signs/Symptoms:tranaminitis, hyperbili, abd pain.     COMPARISON: CTA chest, abdomen and pelvis 7 January 2025; CT abdomen and pelvis with contrast 7 October 2024, 9 September 2024, 26 August 2024 and no fewer than 31 other, older CTs back through the oldest available, 16 December 2010   ACCESSION NUMBER(S): [id number]   ORDERING CLINICIAN: NILAY PAN    TECHNIQUE: Multi-planar, multi-pulse sequence MRI abdomen without intravenous contrast, including MRCP with coronal thick slab volume-rendered reformatted images.   Three dimensional maximum intensity projections (3-D MIPs) image/s were created on a separate dedicated workstation, reviewed and saved   FINDINGS: LIVER: Size: Within normal limits Cirrhotic change: Negative Fatty change: Positive Significant iron deposition: Negative Solid mass: No gross evidence of solid mass. Other: n/a   GALLBLADDER: Cholecystectomy   BILE DUCTS: Intrahepatic dilation: Negative Extrahepatic dilation: Negative Stricture: Negative Stone/s: Negative Other: n/a   PANCREAS: Acute inflammatory change: Negative Morphologic changes of chronic pancreatitis: Negative Peripancreatic collection: Negative Main duct dilation: Negative Pancreas divisum: Negative Solid mass: No obvious solid mass, noting sensitivity and specificity are both limited without IV contrast Cyst / cystic lesion: Negative   SPLEEN: The spleen is normal in size, without focal lesion.   RIGHT ADRENAL GLAND: No mass or hyperplasia.   LEFT ADRENAL GLAND: No mass or hyperplasia.   RIGHT KIDNEY AND INCLUDED URETER: No hydronephrosis or any other acute findings within the limitations of an unenhanced exam. Evaluation for a solid mass is very limited without IV contrast.   LEFT KIDNEY AND INCLUDED URETER: No hydronephrosis or any other acute findings within the limitations of an unenhanced exam.  Evaluation for a solid mass is very limited without IV contrast.   LYMPH NODES: No abdominal adenopathy, intraperitoneal, retroperitoneal or otherwise.   INCLUDED BOWEL: Normal in caliber and wall thickness.   RETROPERITONEUM: Normal.  No hemorrhage or inflammatory change. (lymph nodes in dedicated section)   OMENTUM, MESENTERY AND PERITONEAL SPACES: No fluid collection or free fluid.  Grossly normal omentum and mesentery.  (lymph nodes in dedicated section)   VASCULATURE: No abdominal  aortic aneurysm.   LOWER CHEST: No pleural effusion.   BONES: Normal marrow signal intensity in the included skeleton.       Extensive patient motion artifact throughout the exam   Allowing for an element of physiologic ectasia following cholecystectomy, I do not suspect pathologic biliary duct dilation. The common hepatic duct measures no greater than 3-4 mm. The common bile duct, 5-6 mm   No choledocholithiasis   No demonstrable stricture, noting no noninvasive imaging can definitively exclude ampullary stenosis/stricture or or mass   The liver is fatty but not enlarged or cirrhotic on CTA yesterday, IV contrast confirmed no acute thrombosis in the main or intrahepatic portal veins   No acute pancreatitis, hydronephrosis, abdominal free fluid or fluid collection   MACRO: None   Signed by: Prosper Campos 1/8/2025 1:52 PM Dictation workstation:   [id number]    ECG 12 lead    Result Date: 1/8/2025  Normal sinus rhythm Normal ECG When compared with ECG of 07-OCT-2024 22:13, No significant change was found    CT angio chest abdomen pelvis    Result Date: 1/7/2025  Interpreted By:  Balta Singh and Liller Gregory STUDY: CT ANGIO CHEST ABDOMEN PELVIS;  1/7/2025 4:23 pm   INDICATION: Signs/Symptoms:Bilateral flank pain, left greater than right with pain in her left shoulder blade, lower abdomen.  Rule out dissection. Assess for kidney stones..   COMPARISON: None.   ACCESSION NUMBER(S): [id number]   ORDERING CLINICIAN: MARY IBARRA   TECHNIQUE: CT abdomen pelvis 11/16/2024. CT angio chest abdomen pelvis 08/26/2024.   Axial CT images of the chest, abdomen and pelvis were obtained after the intravenous administration of 90 ML mL Omnipaque 350 using angiographic technique with coronal and sagittal reformatted images. MIP images and 3D reconstructions were created on an independent workstation and reviewed.   FINDINGS: VASCULATURE:   PULMONARY ARTERIES: Normal caliber. No acute pulmonary embolism.   THORACIC AORTA:  Non-contrast images show no evidence of acute intramural hematoma. No thoracic aortic aneurysm or dissection. No significant thoracic aortic atherosclerosis.   ABDOMINAL AORTA: No abdominal aortic aneurysm or dissection. No significant abdominal aortic atherosclerosis.   ABDOMINAL AND PELVIC ARTERIES:  No hemodynamically significant stenosis or occlusion.     CT CHEST:   MEDIASTINUM AND LYMPH NODES:  No enlarged intrathoracic or axillary lymph nodes. No pneumomediastinum. Thyroid gland is unremarkable. Esophagus is mildly patulous but otherwise unremarkable.   HEART: Normal size.  No coronary artery calcifications. No significant pericardial effusion.   LUNG, PLEURA, LARGE AIRWAYS: Large airways are widely patent. No consolidation, pulmonary edema, pleural effusion or pneumothorax.   OSSEOUS STRUCTURES: No acute osseous abnormality.   CHEST WALL SOFT TISSUES: No discernible abnormality.     CT ABDOMEN/PELVIS:   ABDOMINAL WALL: Small fat containing umbilical hernia.   LIVER: No significant parenchymal abnormality.   BILE DUCTS: No significant intrahepatic or extrahepatic dilatation.   GALLBLADDER: Surgically absent.   PANCREAS: No significant abnormality.   SPLEEN: No significant abnormality.   ADRENALS: No significant abnormality.   KIDNEYS, URETERS, BLADDER: The bilateral kidneys enhance symmetrically. There is no hydroureteronephrosis or obstructing radiopaque stone. Unchanged 2 mm nonobstructing stone within the interpolar region of the left kidney. The bladder is distended but otherwise unremarkable.   REPRODUCTIVE ORGANS: No significant abnormality.   VESSELS: (See above). Multiple phleboliths again noted within the pelvis.   RETROPERITONEUM/LYMPH NODES: No mesenteric or retroperitoneal lymphadenopathy by CT size criteria.   BOWEL/MESENTERY/PERITONEUM: The stomach is unremarkable. The small bowel is of normal caliber without dilation. There is mild wall thickening of the rectum and sigmoid colon without  significant surrounding fat stranding. However, there is mild hyperemia. The transverse colon, ascending colon, and cecum are unremarkable. Normal appendix.   No significant ascites, free air, or fluid collection.     OSSEOUS STRUCTURES: No suspicious osseous lesion or acute osseous injury.       1. No acute vascular pathology within the chest, abdomen, or pelvis. 2. Mild thickening of the rectum and sigmoid colon with mild surrounding hyperemia can be seen in the setting of proctocolitis in the appropriate clinical setting. No additional acute pathology within the chest, abdomen, or pelvis. 3. Redemonstration of nonobstructing stone within the interpolar region of the left kidney measuring 2 mm, similar to prior exam.   I personally reviewed the images/study and I agree with the findings as stated above by resident physician, Dr. Adria Swann.   MACRO: none   Signed by: Balta Singh 1/7/2025 4:44 PM Dictation workstation:   [id number]     Assessment/Plan   This is a pleasant 49-year-old lady with a known history of DVT and PE, status postcholecystectomy  who presented with bilateral upper quadrant abdominal tenderness with elevated lipase and transaminase levels as well as hyperbilirubinemia that have all improved; imaging with MRCP had been completed showing no concern for obstruction, her presentation is congruent with acute pancreatitis by the elevated lipase and characteristic pain, crystalloid fluid has been increased by rate and she has been advance to clear liquid diet as tolerated, appreciate the assistance from the GI consulting team.  Assessment & Plan  Acute pancreatitis, unspecified complication status, unspecified pancreatitis type (HHS-HCC)    Class 3 severe obesity due to excess calories without serious comorbidity with body mass index (BMI) of 40.0 to 44.9 in adult    Mixed hyperlipidemia    Plan:  - Patient stable to remain at current level of care  -Appreciate GI input, continue the  crystalloids at increased fluid rate today of 200 mL/h  - Advance diet to clear liquid as tolerated  - Continue analgesia and antiemetics as ordered  - Will follow-up on IgG4, trend liver enzymes daily  - Plan of care discussed in detail with the patient at the bedside during rounds  - Anticipate home with no needs in 1 to 2 days    Diet: Clear  VTE PPx: Subcu heparin  Code: Full           I spent 35 minutes in the professional and overall care of this patient.    Hebert Kearns MD

## 2025-01-08 NOTE — PROGRESS NOTES
Spiritual Care Visit  Spiritual Care Request    Reason for Visit:  Routine Visit: Introduction     Request Received From:       Focus of Care:  Visited With: Patient and family together         Refer to :          Spiritual Care Assessment    Spiritual Assessment:                      Care Provided:  Intended Effects: Promote sense of peace, Preserve dignity and respect, Meaning-making  Methods: Offer spiritual/Temple support  Interventions: Share words of hope and inspiration, [place]    Sense of Community and or Moravian Affiliation:  Non-Mormonism         Addressed Needs/Concerns and/or Eligio Through:          Outcome:        Advance Directives:         Spiritual Care Annotation    Annotation:  Patient was with two visitors.   listened to her talk about her jeancarlos.  Patient had some jeancarlos books with her to read.   prayed at her request.

## 2025-01-08 NOTE — CARE PLAN
The patient's goals for the shift include      The clinical goals for the shift include pt will have pain controlled this shift      Problem: Pain - Adult  Goal: Verbalizes/displays adequate comfort level or baseline comfort level  Outcome: Progressing     Problem: Safety - Adult  Goal: Free from fall injury  Outcome: Progressing     Problem: Discharge Planning  Goal: Discharge to home or other facility with appropriate resources  Outcome: Progressing     Problem: Chronic Conditions and Co-morbidities  Goal: Patient's chronic conditions and co-morbidity symptoms are monitored and maintained or improved  Outcome: Progressing

## 2025-01-09 VITALS
WEIGHT: 241.4 LBS | RESPIRATION RATE: 19 BRPM | DIASTOLIC BLOOD PRESSURE: 58 MMHG | BODY MASS INDEX: 41.21 KG/M2 | HEIGHT: 64 IN | HEART RATE: 86 BPM | TEMPERATURE: 95.9 F | SYSTOLIC BLOOD PRESSURE: 118 MMHG | OXYGEN SATURATION: 95 %

## 2025-01-09 LAB
ALBUMIN SERPL BCP-MCNC: 3.9 G/DL (ref 3.4–5)
ALP SERPL-CCNC: 40 U/L (ref 33–110)
ALT SERPL W P-5'-P-CCNC: 21 U/L (ref 7–45)
ANION GAP SERPL CALC-SCNC: 12 MMOL/L (ref 10–20)
AST SERPL W P-5'-P-CCNC: 16 U/L (ref 9–39)
BILIRUB DIRECT SERPL-MCNC: 0.2 MG/DL (ref 0–0.3)
BILIRUB SERPL-MCNC: 1.1 MG/DL (ref 0–1.2)
BUN SERPL-MCNC: 7 MG/DL (ref 6–23)
CALCIUM SERPL-MCNC: 8.7 MG/DL (ref 8.6–10.3)
CHLORIDE SERPL-SCNC: 104 MMOL/L (ref 98–107)
CO2 SERPL-SCNC: 27 MMOL/L (ref 21–32)
CREAT SERPL-MCNC: 0.7 MG/DL (ref 0.5–1.05)
EGFRCR SERPLBLD CKD-EPI 2021: >90 ML/MIN/1.73M*2
ERYTHROCYTE [DISTWIDTH] IN BLOOD BY AUTOMATED COUNT: 13.5 % (ref 11.5–14.5)
GLUCOSE SERPL-MCNC: 112 MG/DL (ref 74–99)
HCT VFR BLD AUTO: 33.3 % (ref 36–46)
HGB BLD-MCNC: 11.6 G/DL (ref 12–16)
IGG4 SER-MCNC: 31 MG/DL (ref 3–200)
MAGNESIUM SERPL-MCNC: 1.94 MG/DL (ref 1.6–2.4)
MCH RBC QN AUTO: 30.4 PG (ref 26–34)
MCHC RBC AUTO-ENTMCNC: 34.8 G/DL (ref 32–36)
MCV RBC AUTO: 87 FL (ref 80–100)
NRBC BLD-RTO: 0 /100 WBCS (ref 0–0)
PLATELET # BLD AUTO: 277 X10*3/UL (ref 150–450)
POTASSIUM SERPL-SCNC: 3.6 MMOL/L (ref 3.5–5.3)
PROT SERPL-MCNC: 6.2 G/DL (ref 6.4–8.2)
RBC # BLD AUTO: 3.82 X10*6/UL (ref 4–5.2)
SODIUM SERPL-SCNC: 139 MMOL/L (ref 136–145)
WBC # BLD AUTO: 7.9 X10*3/UL (ref 4.4–11.3)

## 2025-01-09 PROCEDURE — 96372 THER/PROPH/DIAG INJ SC/IM: CPT | Performed by: NURSE PRACTITIONER

## 2025-01-09 PROCEDURE — 2500000004 HC RX 250 GENERAL PHARMACY W/ HCPCS (ALT 636 FOR OP/ED): Performed by: NURSE PRACTITIONER

## 2025-01-09 PROCEDURE — 85027 COMPLETE CBC AUTOMATED: CPT | Performed by: INTERNAL MEDICINE

## 2025-01-09 PROCEDURE — 96361 HYDRATE IV INFUSION ADD-ON: CPT

## 2025-01-09 PROCEDURE — 82787 IGG 1 2 3 OR 4 EACH: CPT | Mod: STJLAB | Performed by: NURSE PRACTITIONER

## 2025-01-09 PROCEDURE — 2500000001 HC RX 250 WO HCPCS SELF ADMINISTERED DRUGS (ALT 637 FOR MEDICARE OP): Performed by: NURSE PRACTITIONER

## 2025-01-09 PROCEDURE — 82248 BILIRUBIN DIRECT: CPT | Performed by: INTERNAL MEDICINE

## 2025-01-09 PROCEDURE — 36415 COLL VENOUS BLD VENIPUNCTURE: CPT | Performed by: INTERNAL MEDICINE

## 2025-01-09 PROCEDURE — 96376 TX/PRO/DX INJ SAME DRUG ADON: CPT

## 2025-01-09 PROCEDURE — 80053 COMPREHEN METABOLIC PANEL: CPT | Performed by: INTERNAL MEDICINE

## 2025-01-09 PROCEDURE — G0378 HOSPITAL OBSERVATION PER HR: HCPCS

## 2025-01-09 PROCEDURE — 83735 ASSAY OF MAGNESIUM: CPT | Performed by: INTERNAL MEDICINE

## 2025-01-09 PROCEDURE — 99238 HOSP IP/OBS DSCHRG MGMT 30/<: CPT | Performed by: INTERNAL MEDICINE

## 2025-01-09 PROCEDURE — 2500000004 HC RX 250 GENERAL PHARMACY W/ HCPCS (ALT 636 FOR OP/ED)

## 2025-01-09 PROCEDURE — 2500000001 HC RX 250 WO HCPCS SELF ADMINISTERED DRUGS (ALT 637 FOR MEDICARE OP): Performed by: INTERNAL MEDICINE

## 2025-01-09 RX ORDER — AMOXICILLIN 250 MG
1 CAPSULE ORAL 2 TIMES DAILY
Status: DISCONTINUED | OUTPATIENT
Start: 2025-01-09 | End: 2025-01-09 | Stop reason: HOSPADM

## 2025-01-09 RX ORDER — OXYCODONE HYDROCHLORIDE 5 MG/1
5 TABLET ORAL EVERY 6 HOURS PRN
Status: DISCONTINUED | OUTPATIENT
Start: 2025-01-09 | End: 2025-01-09 | Stop reason: HOSPADM

## 2025-01-09 RX ORDER — AMOXICILLIN 250 MG
1 CAPSULE ORAL 2 TIMES DAILY
Qty: 4 TABLET | Refills: 0 | Status: SHIPPED | OUTPATIENT
Start: 2025-01-09 | End: 2025-01-11

## 2025-01-09 RX ORDER — OXYCODONE HYDROCHLORIDE 5 MG/1
5 TABLET ORAL EVERY 6 HOURS PRN
Qty: 8 TABLET | Refills: 0 | Status: SHIPPED | OUTPATIENT
Start: 2025-01-09 | End: 2025-01-11

## 2025-01-09 RX ADMIN — HYDROMORPHONE HYDROCHLORIDE 0.2 MG: 0.2 INJECTION, SOLUTION INTRAMUSCULAR; INTRAVENOUS; SUBCUTANEOUS at 03:04

## 2025-01-09 RX ADMIN — SODIUM CHLORIDE, POTASSIUM CHLORIDE, SODIUM LACTATE AND CALCIUM CHLORIDE 200 ML/HR: 600; 310; 30; 20 INJECTION, SOLUTION INTRAVENOUS at 05:36

## 2025-01-09 RX ADMIN — HEPARIN SODIUM 7500 UNITS: 5000 INJECTION, SOLUTION INTRAVENOUS; SUBCUTANEOUS at 05:38

## 2025-01-09 RX ADMIN — PROCHLORPERAZINE EDISYLATE 5 MG: 5 INJECTION INTRAMUSCULAR; INTRAVENOUS at 11:22

## 2025-01-09 RX ADMIN — OXYCODONE HYDROCHLORIDE 5 MG: 5 TABLET ORAL at 11:26

## 2025-01-09 RX ADMIN — HEPARIN SODIUM 7500 UNITS: 5000 INJECTION, SOLUTION INTRAVENOUS; SUBCUTANEOUS at 14:02

## 2025-01-09 RX ADMIN — PANTOPRAZOLE SODIUM 40 MG: 40 TABLET, DELAYED RELEASE ORAL at 05:38

## 2025-01-09 RX ADMIN — SODIUM CHLORIDE, POTASSIUM CHLORIDE, SODIUM LACTATE AND CALCIUM CHLORIDE 200 ML/HR: 600; 310; 30; 20 INJECTION, SOLUTION INTRAVENOUS at 00:15

## 2025-01-09 RX ADMIN — HYDROMORPHONE HYDROCHLORIDE 0.2 MG: 0.2 INJECTION, SOLUTION INTRAMUSCULAR; INTRAVENOUS; SUBCUTANEOUS at 06:36

## 2025-01-09 ASSESSMENT — PAIN SCALES - GENERAL
PAINLEVEL_OUTOF10: 8
PAINLEVEL_OUTOF10: 7
PAINLEVEL_OUTOF10: 4
PAINLEVEL_OUTOF10: 7

## 2025-01-09 ASSESSMENT — COGNITIVE AND FUNCTIONAL STATUS - GENERAL
MOBILITY SCORE: 24
DAILY ACTIVITIY SCORE: 24

## 2025-01-09 ASSESSMENT — PAIN DESCRIPTION - ORIENTATION: ORIENTATION: RIGHT;LEFT

## 2025-01-09 ASSESSMENT — PAIN DESCRIPTION - LOCATION
LOCATION: ABDOMEN

## 2025-01-09 ASSESSMENT — ACTIVITIES OF DAILY LIVING (ADL): LACK_OF_TRANSPORTATION: NO

## 2025-01-09 NOTE — CARE PLAN
The patient's goals for the shift include      The clinical goals for the shift include Pt will report improved nausea

## 2025-01-09 NOTE — SIGNIFICANT EVENT
To Whom It May Concern:     Tor Patino has been under our care at [facility] since 1/9/2025.  Tor is permitted to return to work on Monday, 1/13/25 without restrictions.     Thank you,   Hebert Kearns MD  [facility]  [id number] [place], [place] [id number]  Tel: [phone]  Fax: [phone]

## 2025-01-09 NOTE — CARE PLAN
Problem: Pain - Adult  Goal: Verbalizes/displays adequate comfort level or baseline comfort level  Outcome: Progressing     Problem: Safety - Adult  Goal: Free from fall injury  Outcome: Progressing     Problem: Discharge Planning  Goal: Discharge to home or other facility with appropriate resources  Outcome: Progressing     Problem: Chronic Conditions and Co-morbidities  Goal: Patient's chronic conditions and co-morbidity symptoms are monitored and maintained or improved  Outcome: Progressing   The patient's goals for the shift include      The clinical goals for the shift include Pt will report improved nausea

## 2025-01-09 NOTE — DISCHARGE SUMMARY
Discharge Diagnosis  Acute pancreatitis, unspecified complication status, unspecified pancreatitis type (HHS-HCC)    Issues Requiring Follow-Up  It was a pleasure to meet you in the hospital  You were diagnosed as likely having pancreatitis, this was likely due to having a possible gall stone that was retained that passed  The results of an MRCP showed there was no actual stone in place; the labs normalized and you were [id number] improved  You were seen by the GI, or stomach doctor team, while you were in the hospital  You had lab tests sent that were still pending to rule out auto-immune pancreatitis  These tests can be followed up by your primary care physician  You should plan on following up from this hospital stay in the next 2 weeks  None of your other medications were changed or altered  You are free to discharge home from the hospital  A Rx for a small amount of pain medication will be called to your local pharmacy for the ongoing abdominal pain that is improving  You should also plan on taking senna-docusate if you are using the narcotic for pain control    Test Results Pending At Discharge  Pending Labs       Order Current Status    IgG 4 In process    Phosphatidylethanol (PEth), Whole Blood, Quantitative In process          Hospital Course  This is a pleasant 49-year-old lady with a known history of DVT and PE status postcholecystectomy who presented with bilateral upper quadrant abdominal pain with elevated lipase and transaminase levels as well as Hyper bilirubinemia that all improved with symptomatic treatment; gastroenterology was consulted and imaging with MRCP had been completed that showed no concern for any obstructive process, her presentation was congruent with acute pancreatitis given the elevated lipase and characteristic pain, her symptoms improved and resolved with crystalloid fluid and her diet was slowly advanced from clear liquids to regular diet; the GI team did send a IgG4 and PEth to  rule out  autoimmune pancreatitis, although this was thought to be less likely; given that the patient was feeling improved, she can be discharged and follow-up with her primary care physician within the next 2 weeks of discharge from the hospital stay.    It was thought that she may have had a retained stone that passed given the resolution of her hyperbilirubinemia, transaminase levels and improvement with fluids and symptomatic treatment.    Hebert Kearns MD  [facility] [place]  Internal Medicine    This document was generated in whole or in part using the Dragon One medical voice recognition software and there may be some incorrect words/wording, spelling, or punctuation errors that were not corrected prior to finalization in the medical record.    Pertinent Physical Exam At Time of Discharge  Physical Exam  Constitutional: Well developed, awake/alert/oriented x3, no distress, alert and cooperative   ENMT: mucous membranes moist   Head/Neck: Neck supple   Respiratory/Thorax: Patent airways, CTAB, normal breath sounds with good chest expansion, thorax symmetric   Cardiovascular: Regular, rate and rhythm, no murmurs, 2+ equal pulses of the extremities, normal S 1and S 2   Gastrointestinal: Nondistended, soft, tender upper right and left quadrants has resolved, no rebound tenderness or guarding, no masses palpable, no organomegaly, +BS, no bruits   Musculoskeletal: ROM intact, no joint swelling, normal strength   Extremities: normal extremities, no cyanosis edema, contusions or wounds, no clubbing   Neurological: alert and oriented x3, intact senses, motor, response and reflexes, normal strength     Home Medications     Medication List      ASK your doctor about these medications     estradiol 10 mcg tablet vaginal tablet; Commonly known as: Vagifem;   Insert 1 tablet (10 mcg) into the vagina once daily at bedtime. Insert 1   tablet into vagina at bedtime for 2 weeks, then at bedtime twice a week.    hydroCHLOROthiazide 25 mg tablet; Commonly known as: HYDRODiuril; Take 1   tablet (25 mg) by mouth once daily.       Outpatient Follow-Up  Future Appointments   Date Time Provider Department Center   7/8/2025  4:40 PM Mauricio Bills MD [id number] [place]       Hebert Kearns MD

## 2025-01-09 NOTE — PROGRESS NOTES
"Subjective  Patient sitting up on side of the bed with much improvement in abdominal discomfort though she does continue to have mild RUQ discomfort.  MRCP noting no biliary dilation, choledocholithiasis or pancreatic abnormalities.  Tolerating clear liquids.  Likely gallstone pancreatitis with stone that has passed.  Plan for outpatient follow-up in GI clinic.    Objective  Blood pressure 120/67, pulse 86, temperature 36.9 °C (98.4 °F), temperature source Temporal, resp. rate 14, height 1.626 m (5' 4\"), weight 110 kg (241 lb 6.5 oz), SpO2 94%.    Physical Exam  Constitutional: Alert, pleasant and interactive, in NAD  Eyes: PERRL, sclera clear, no conjunctival injection  Skin: Warm and dry, no rash or ecchymosis  ENMT: Mucous membranes moist, no lesions noted  Resp: CTAB, even and unlabored  CV: RRR, normal S1, S2, no m,r,g  GI: +BS, soft, round, mild RUQ TTP, no rebound tenderness or guarding, no palpable masses or organomegaly  Extremities: Extremities warm, no edema, contusions, wounds or cyanosis  Neuro: Alert and oriented x3  Psych: Appropriate mood and behavior    Medications  Scheduled medications  heparin (porcine), 7,500 Units, subcutaneous, q8h TENZIN  pantoprazole, 40 mg, oral, Daily before breakfast   Or  pantoprazole, 40 mg, intravenous, Daily before breakfast  sennosides-docusate sodium, 1 tablet, oral, BID      Continuous medications     PRN medications  PRN medications: acetaminophen, melatonin, oxyCODONE, polyethylene glycol, prochlorperazine     Labs  Lab Results   Component Value Date    WBC 7.9 01/09/2025    HGB 11.6 (L) 01/09/2025    HCT 33.3 (L) 01/09/2025    MCV 87 01/09/2025     01/09/2025     Lab Results   Component Value Date    GLUCOSE 112 (H) 01/09/2025    CALCIUM 8.7 01/09/2025     01/09/2025    K 3.6 01/09/2025    CO2 27 01/09/2025     01/09/2025    BUN 7 01/09/2025    CREATININE 0.70 01/09/2025     Lab Results   Component Value Date    ALT 21 01/09/2025    AST 16 " "01/09/2025    ALKPHOS 40 01/09/2025    BILITOT 1.1 01/09/2025     No results found for: \"IRON\", \"TIBC\", \"FERRITIN\"  Lab Results   Component Value Date    INR 0.9 08/26/2024    INR 0.9 09/05/2022    PROTIME 10.3 08/26/2024    PROTIME 10.9 09/05/2022     Radiology  MRCP pancreas without IV contrast 1/8/2025 noting:  Impression:     Extensive patient motion artifact throughout the exam      Allowing for an element of physiologic ectasia following  cholecystectomy, I do not suspect pathologic biliary duct dilation.  The common hepatic duct measures no greater than 3-4 mm. The common  bile duct, 5-6 mm      No choledocholithiasis      No demonstrable stricture, noting no noninvasive imaging can  definitively exclude ampullary stenosis/stricture or or mass      The liver is fatty but not enlarged or cirrhotic on CTA yesterday, IV  contrast confirmed no acute thrombosis in the main or intrahepatic  portal veins      No acute pancreatitis, hydronephrosis, abdominal free fluid or fluid  collection    Signed by: Prosper Campos 1/8/2025 1:52 PM  Dictation workstation:   [id number]     CT angio chest A/P 1/7/2025 noting:  IMPRESSION:  1. No acute vascular pathology within the chest, abdomen, or pelvis.  2. Mild thickening of the rectum and sigmoid colon with mild  surrounding hyperemia can be seen in the setting of proctocolitis in  the appropriate clinical setting. No additional acute pathology  within the chest, abdomen, or pelvis.  3. Redemonstration of nonobstructing stone within the interpolar  region of the left kidney measuring 2 mm, similar to prior exam.      I personally reviewed the images/study and I agree with the findings  as stated above by resident physician, Dr. Adria Swann.     Signed by: Balta Singh 1/7/2025 4:44 PM  Dictation workstation:   [id number]     Assessment:  Tor Patino is a 49 y.o. female with PMH of PE/DVT, cholecystectomy, cholelithiasis, nephrolithiasis, HLD presenting with " upper abdominal discomfort and nausea.  Lipase noted to be 875.  Liver enzymes noting alk phos 330-> 41, -> 26, -> 22, T. bili 4.1->1.3. MRCP with no indication of choledocholithiasis, biliary dilation or pancreatic abnormalities.    Patient feeling much better today with residual RUQ discomfort.  Tolerating clear liquids.  Liver enzymes normalized.     # acute pancreatitis-likely gallstone pancreatitis with stone which has passed  # transaminitis/ hyperbilirubinemia-gallstone pancreatitis v  autoimmune-normalized  # nausea- improved  # upper abd discomfort- improving  # proctocolitis on imaging      Plan:  - continue supportive care  - ok to advance to low-fat diet as tolerated  - continue to trend liver enzymes daily  - continue analgesics and antiemetics as needed  - follow up Peth and IgG4  - pt can follow up in GI clinic     Plan has been discussed with Dr. Spencer. GI will sign off.      Deborah Grant, APRN/CNP

## 2025-01-09 NOTE — PROGRESS NOTES
01/09/25 1017   Discharge Planning   Living Arrangements Alone   Support Systems Family members;Friends/neighbors   Assistance Needed none   Type of Residence Private residence   Home or Post Acute Services None   Expected Discharge Disposition Home   Financial Resource Strain   How hard is it for you to pay for the very basics like food, housing, medical care, and heating? Not hard   Housing Stability   In the last 12 months, was there a time when you were not able to pay the mortgage or rent on time? N   At any time in the past 12 months, were you homeless or living in a shelter (including now)? N   Transportation Needs   In the past 12 months, has lack of transportation kept you from medical appointments or from getting medications? no   In the past 12 months, has lack of transportation kept you from meetings, work, or from getting things needed for daily living? No   Intensity of Service   Intensity of Service 0-30 min     Spoke to patient at bedside to explain my role in discharge planning. Patient states she lives at home alone. PCP is Dr Montilla. Patient denies any concerns with transportation. Patient states she feels she effectively manages her health at home and plans to return home when medically ready.

## 2025-01-10 ENCOUNTER — PATIENT OUTREACH (OUTPATIENT)
Dept: CARE COORDINATION | Facility: CLINIC | Age: 50
End: 2025-01-10
Payer: COMMERCIAL

## 2025-01-10 SDOH — ECONOMIC STABILITY: GENERAL: WOULD YOU LIKE HELP WITH ANY OF THE FOLLOWING NEEDS?: I DONT NEED HELP WITH ANY OF THESE

## 2025-01-10 SDOH — ECONOMIC STABILITY: FOOD INSECURITY
ARE ANY OF YOUR NEEDS URGENT? FOR EXAMPLE, UNCERTAINTY OF WHERE YOU WILL GET YOUR NEXT MEAL OR NOT HAVING THE MEDICATIONS YOU NEED TO TAKE TOMORROW.: NO

## 2025-01-10 NOTE — PROGRESS NOTES
Outreach call to patient to support a smooth transition of care from recent admission.  Spoke with patient, reviewed discharge medications, discharge instructions, assessed social needs, and provided education on importance of follow-up appointment with provider.  Will continue to monitor through transition period.  Discharge Diagnosis  Acute pancreatitis, unspecified complication status, unspecified pancreatitis type ([facility]-HCC)     Issues Requiring Follow-Up  It was a pleasure to meet you in the hospital  You were diagnosed as likely having pancreatitis, this was likely due to having a possible gall stone that was retained that passed  The results of an MRCP showed there was no actual stone in place; the labs normalized and you were [id number] improved  You were seen by the GI, or stomach doctor team, while you were in the hospital  You had lab tests sent that were still pending to rule out auto-immune pancreatitis  These tests can be followed up by your primary care physician  You should plan on following up from this hospital stay in the next 2 weeks  None of your other medications were changed or altered  You are free to discharge home from the hospital  A Rx for a small amount of pain medication will be called to your local pharmacy for the ongoing abdominal pain that is improving  You should also plan on taking senna-docusate if you are using the narcotic for pain control        Thank you,   Shala Mai , RN   Nurse Care Manager   [facility] Accountable Care Department   [phone]

## 2025-01-11 LAB
LABORATORY REPORT: NORMAL
PETH INTERPRETATION: NORMAL
PLPETH BLD-MCNC: <10 NG/ML
POPETH BLD-MCNC: <10 NG/ML

## 2025-01-14 ENCOUNTER — LAB (OUTPATIENT)
Dept: LAB | Facility: LAB | Age: 50
End: 2025-01-14
Payer: COMMERCIAL

## 2025-01-14 ENCOUNTER — APPOINTMENT (OUTPATIENT)
Dept: NEPHROLOGY | Facility: CLINIC | Age: 50
End: 2025-01-14
Payer: COMMERCIAL

## 2025-01-14 DIAGNOSIS — N20.0 KIDNEY STONE: ICD-10-CM

## 2025-01-14 DIAGNOSIS — R82.994 HYPERCALCIURIA: ICD-10-CM

## 2025-01-14 LAB
ANION GAP SERPL CALC-SCNC: 16 MMOL/L (ref 10–20)
BUN SERPL-MCNC: 11 MG/DL (ref 6–23)
CALCIUM SERPL-MCNC: 9.8 MG/DL (ref 8.6–10.3)
CHLORIDE SERPL-SCNC: 99 MMOL/L (ref 98–107)
CO2 SERPL-SCNC: 27 MMOL/L (ref 21–32)
CREAT SERPL-MCNC: 0.92 MG/DL (ref 0.5–1.05)
EGFRCR SERPLBLD CKD-EPI 2021: 77 ML/MIN/1.73M*2
GLUCOSE SERPL-MCNC: 120 MG/DL (ref 74–99)
POTASSIUM SERPL-SCNC: 3.5 MMOL/L (ref 3.5–5.3)
SODIUM SERPL-SCNC: 138 MMOL/L (ref 136–145)

## 2025-01-14 PROCEDURE — 80048 BASIC METABOLIC PNL TOTAL CA: CPT

## 2025-01-15 ENCOUNTER — TELEPHONE (OUTPATIENT)
Dept: NEPHROLOGY | Facility: CLINIC | Age: 50
End: 2025-01-15
Payer: COMMERCIAL

## 2025-01-20 ENCOUNTER — APPOINTMENT (OUTPATIENT)
Dept: GASTROENTEROLOGY | Facility: CLINIC | Age: 50
End: 2025-01-20
Payer: COMMERCIAL

## 2025-01-20 ENCOUNTER — LAB (OUTPATIENT)
Dept: LAB | Facility: LAB | Age: 50
End: 2025-01-20
Payer: COMMERCIAL

## 2025-01-20 VITALS
HEIGHT: 64 IN | SYSTOLIC BLOOD PRESSURE: 116 MMHG | DIASTOLIC BLOOD PRESSURE: 80 MMHG | BODY MASS INDEX: 40.75 KG/M2 | HEART RATE: 74 BPM | OXYGEN SATURATION: 94 % | WEIGHT: 238.7 LBS | RESPIRATION RATE: 18 BRPM

## 2025-01-20 DIAGNOSIS — R10.30 LOWER ABDOMINAL PAIN: ICD-10-CM

## 2025-01-20 DIAGNOSIS — K85.90 ACUTE PANCREATITIS, UNSPECIFIED COMPLICATION STATUS, UNSPECIFIED PANCREATITIS TYPE (HHS-HCC): ICD-10-CM

## 2025-01-20 DIAGNOSIS — R93.89 ABNORMAL CT SCAN: ICD-10-CM

## 2025-01-20 DIAGNOSIS — K85.90 ACUTE PANCREATITIS, UNSPECIFIED COMPLICATION STATUS, UNSPECIFIED PANCREATITIS TYPE (HHS-HCC): Primary | ICD-10-CM

## 2025-01-20 LAB
ALBUMIN SERPL BCP-MCNC: 4.8 G/DL (ref 3.4–5)
ALP SERPL-CCNC: 64 U/L (ref 33–110)
ALT SERPL W P-5'-P-CCNC: 23 U/L (ref 7–45)
AST SERPL W P-5'-P-CCNC: 19 U/L (ref 9–39)
BILIRUB DIRECT SERPL-MCNC: 0.1 MG/DL (ref 0–0.3)
BILIRUB SERPL-MCNC: 0.5 MG/DL (ref 0–1.2)
PROT SERPL-MCNC: 7.3 G/DL (ref 6.4–8.2)

## 2025-01-20 PROCEDURE — 99215 OFFICE O/P EST HI 40 MIN: CPT | Performed by: STUDENT IN AN ORGANIZED HEALTH CARE EDUCATION/TRAINING PROGRAM

## 2025-01-20 PROCEDURE — 83013 H PYLORI (C-13) BREATH: CPT

## 2025-01-20 PROCEDURE — 80076 HEPATIC FUNCTION PANEL: CPT

## 2025-01-20 PROCEDURE — 1036F TOBACCO NON-USER: CPT | Performed by: STUDENT IN AN ORGANIZED HEALTH CARE EDUCATION/TRAINING PROGRAM

## 2025-01-20 PROCEDURE — 3008F BODY MASS INDEX DOCD: CPT | Performed by: STUDENT IN AN ORGANIZED HEALTH CARE EDUCATION/TRAINING PROGRAM

## 2025-01-20 RX ORDER — OXYCODONE AND ACETAMINOPHEN 5; 325 MG/1; MG/1
1 TABLET ORAL EVERY 8 HOURS PRN
COMMUNITY
Start: 2025-01-14 | End: 2025-01-21

## 2025-01-20 RX ORDER — POLYETHYLENE GLYCOL 3350, SODIUM SULFATE ANHYDROUS, SODIUM BICARBONATE, SODIUM CHLORIDE, POTASSIUM CHLORIDE 236; 22.74; 6.74; 5.86; 2.97 G/4L; G/4L; G/4L; G/4L; G/4L
4000 POWDER, FOR SOLUTION ORAL ONCE
Qty: 4000 ML | Refills: 0 | Status: SHIPPED | OUTPATIENT
Start: 2025-01-20 | End: 2025-01-20

## 2025-01-20 RX ORDER — OMEPRAZOLE 40 MG/1
40 CAPSULE, DELAYED RELEASE ORAL
Qty: 60 CAPSULE | Refills: 11 | Status: SHIPPED | OUTPATIENT
Start: 2025-01-20 | End: 2026-01-20

## 2025-01-20 NOTE — PROGRESS NOTES
Subjective     History of Present Illness:   Tor Patino is a 49 y.o. female w with history of DVT and pulmonary embolism provoked after ovarian cyst surgery (2016), former smoker (quit in 2016) who presents to clinic for follow-up from recent hospital admission for acute pancreatitis attributed to biliary colic.    Patient reports she had a cholecystectomy years ago however over the past 6 months she has been having pain in her left and right lower flank region.  She suspected this was due to renal stones as it has been an issue she was dealing with.  The pain waxes and wanes and is rated anywhere from a 4 to an 8.  It affects her ability to sleep.  She also notices new abdominal pain in her mid abdomen that worsens after meals.  She states that it is a burning sensation.  She does have nausea but no vomiting.  She does take Zofran for her symptoms.  She also takes Percocet for her back pain.    Patient denies any history of NSAID use.  She does report family history of ulcers in her father.    She denies any family history of GI malignancy.    She states she had a colonoscopy last year and was told she is good for 10 years.    Review of records show that patient was admitted earlier this month at [facility] for acute pancreatitis based on lipase elevation to the 800s in the setting of deranged liver enzymes that rapidly decreased over the course of her admission along with improvement in her abdominal pain.  MRI completed at that time did not show any obstructive process.  Past testing was negative.  IgG4 was negative.      Past Medical History   has a past medical history of Endometriosis and Pulmonary embolism.     Social History   reports that she has quit smoking. Her smoking use included cigarettes. She has never used smokeless tobacco. She reports that she does not drink alcohol and does not use drugs.     Family History  family history includes Breast cancer in her maternal grandmother.      Allergies  Allergies   Allergen Reactions   • Bee Pollen Unknown     Other reaction(s): Note:, Other: See Comments   hayfever like symptoms pt states, noise running, eyes watering. (only with new cut grass)       Medications  Current Outpatient Medications   Medication Instructions   • hydroCHLOROthiazide (HYDRODIURIL) 25 mg, oral, Daily   • omeprazole (PRILOSEC) 40 mg, oral, 2 times daily before meals, Do not crush or chew.   • oxyCODONE-acetaminophen (Percocet) 5-325 mg tablet 1 tablet, Every 8 hours PRN   • polyethylene glycol (Golytely) 236-22.74-6.74 -5.86 gram solution 4,000 mL, oral, Once        Objective   Visit Vitals  /80 (BP Location: Right arm, Patient Position: Sitting, BP Cuff Size: Large adult)   Pulse 74   Resp 18      Physical Exam  Constitutional:       General: She is not in acute distress.     Appearance: Normal appearance.   HENT:      Head: Normocephalic and atraumatic.      Mouth/Throat:      Mouth: Mucous membranes are moist.   Eyes:      Extraocular Movements: Extraocular movements intact.   Pulmonary:      Effort: Pulmonary effort is normal.      Breath sounds: No stridor. No wheezing.   Abdominal:      General: Abdomen is flat. There is no distension.   Musculoskeletal:         General: Normal range of motion.   Skin:     General: Skin is warm and dry.   Neurological:      General: No focal deficit present.      Mental Status: She is alert.   Psychiatric:         Mood and Affect: Mood normal.         Judgment: Judgment normal.       Assessment/Plan   Tor Patino is a 49 y.o. female w with history of DVT and pulmonary embolism provoked after ovarian cyst surgery (2016), former smoker (quit in 2016) who presents to clinic for follow-up from recent hospital admission for acute pancreatitis attributed to biliary colic.  She continues to experience abdominal discomfort however it is atypical for a biliary process given location in the lower flank regions.  Superimposed on this is  an element of dyspepsia with burning abdominal pain after meals.  At this time, differential for her symptoms include peptic ulcer disease, gastritis, biliary sphincter disorder, ?IBD.  will repeat liver enzymes to eval for recurrent biliary process.  If they have increased since normalization we will plan to obtain serologic workup as well.  Will trial PPI after ruling out H. pylori.  Will plan upper endoscopy as well as colonoscopy given findings of  possible rectal inflammation on CT. patient pending workup above.      Discussed with patient that if she does not have symptomatic improvement with PPI therapy will trial Bentyl pending workup above.  She states Bentyl is a medication she had tried in the past with some relief    Problem List Items Addressed This Visit       Acute pancreatitis, unspecified complication status, unspecified pancreatitis type ([facility]-H[facility]) - Primary    Relevant Medications    polyethylene glycol (Golytely) 236-22.74-6.74 -5.86 gram solution    Other Relevant Orders    Hepatic function panel     Other Visit Diagnoses       Abnormal CT scan        Relevant Medications    polyethylene glycol (Golytely) 236-22.74-6.74 -5.86 gram solution    Other Relevant Orders    Colonoscopy Diagnostic (abnormal CT)    Lower abdominal pain        Relevant Medications    omeprazole (PriLOSEC) 40 mg DR capsule    polyethylene glycol (Golytely) 236-22.74-6.74 -5.86 gram solution    Other Relevant Orders    Esophagogastroduodenoscopy (EGD)    H. Pylori Breath Test                   Jelly Rivera MD         My final recommendations will be communicated back to the requesting physician by way of shared Medical record or letter to requesting physician via fax.

## 2025-01-21 ENCOUNTER — ANESTHESIA EVENT (OUTPATIENT)
Dept: GASTROENTEROLOGY | Facility: EXTERNAL LOCATION | Age: 50
End: 2025-01-21

## 2025-01-21 LAB — UREA BREATH TEST QL: NEGATIVE

## 2025-01-23 ENCOUNTER — PATIENT OUTREACH (OUTPATIENT)
Dept: CARE COORDINATION | Facility: CLINIC | Age: 50
End: 2025-01-23
Payer: COMMERCIAL

## 2025-01-28 ENCOUNTER — ANESTHESIA (OUTPATIENT)
Dept: GASTROENTEROLOGY | Facility: EXTERNAL LOCATION | Age: 50
End: 2025-01-28

## 2025-01-28 ENCOUNTER — APPOINTMENT (OUTPATIENT)
Dept: GASTROENTEROLOGY | Facility: EXTERNAL LOCATION | Age: 50
End: 2025-01-28
Payer: COMMERCIAL

## 2025-01-28 VITALS
OXYGEN SATURATION: 98 % | SYSTOLIC BLOOD PRESSURE: 121 MMHG | BODY MASS INDEX: 40.97 KG/M2 | TEMPERATURE: 97.2 F | RESPIRATION RATE: 21 BRPM | DIASTOLIC BLOOD PRESSURE: 83 MMHG | HEART RATE: 73 BPM | HEIGHT: 64 IN | WEIGHT: 240 LBS

## 2025-01-28 DIAGNOSIS — R10.30 LOWER ABDOMINAL PAIN: ICD-10-CM

## 2025-01-28 DIAGNOSIS — R93.89 ABNORMAL CT SCAN: ICD-10-CM

## 2025-01-28 PROCEDURE — 43239 EGD BIOPSY SINGLE/MULTIPLE: CPT | Performed by: STUDENT IN AN ORGANIZED HEALTH CARE EDUCATION/TRAINING PROGRAM

## 2025-01-28 PROCEDURE — 45380 COLONOSCOPY AND BIOPSY: CPT | Performed by: STUDENT IN AN ORGANIZED HEALTH CARE EDUCATION/TRAINING PROGRAM

## 2025-01-28 RX ORDER — PROPOFOL 10 MG/ML
INJECTION, EMULSION INTRAVENOUS AS NEEDED
Status: DISCONTINUED | OUTPATIENT
Start: 2025-01-28 | End: 2025-01-28

## 2025-01-28 RX ORDER — LIDOCAINE HYDROCHLORIDE 20 MG/ML
INJECTION, SOLUTION INFILTRATION; PERINEURAL AS NEEDED
Status: DISCONTINUED | OUTPATIENT
Start: 2025-01-28 | End: 2025-01-28

## 2025-01-28 RX ORDER — SODIUM CHLORIDE 9 MG/ML
INJECTION, SOLUTION INTRAVENOUS CONTINUOUS PRN
Status: DISCONTINUED | OUTPATIENT
Start: 2025-01-28 | End: 2025-01-28

## 2025-01-28 RX ORDER — ONDANSETRON HYDROCHLORIDE 2 MG/ML
4 INJECTION, SOLUTION INTRAVENOUS ONCE AS NEEDED
Status: DISCONTINUED | OUTPATIENT
Start: 2025-01-28 | End: 2025-01-29 | Stop reason: HOSPADM

## 2025-01-28 RX ADMIN — PROPOFOL 50 MG: 10 INJECTION, EMULSION INTRAVENOUS at 09:47

## 2025-01-28 RX ADMIN — SODIUM CHLORIDE: 9 INJECTION, SOLUTION INTRAVENOUS at 09:38

## 2025-01-28 RX ADMIN — PROPOFOL 50 MG: 10 INJECTION, EMULSION INTRAVENOUS at 09:46

## 2025-01-28 RX ADMIN — PROPOFOL 100 MG: 10 INJECTION, EMULSION INTRAVENOUS at 09:44

## 2025-01-28 RX ADMIN — PROPOFOL 50 MG: 10 INJECTION, EMULSION INTRAVENOUS at 09:50

## 2025-01-28 RX ADMIN — PROPOFOL 50 MG: 10 INJECTION, EMULSION INTRAVENOUS at 09:55

## 2025-01-28 RX ADMIN — PROPOFOL 50 MG: 10 INJECTION, EMULSION INTRAVENOUS at 09:52

## 2025-01-28 RX ADMIN — PROPOFOL 50 MG: 10 INJECTION, EMULSION INTRAVENOUS at 10:06

## 2025-01-28 RX ADMIN — PROPOFOL 50 MG: 10 INJECTION, EMULSION INTRAVENOUS at 10:00

## 2025-01-28 RX ADMIN — LIDOCAINE HYDROCHLORIDE 3 ML: 20 INJECTION, SOLUTION INFILTRATION; PERINEURAL at 09:44

## 2025-01-28 SDOH — HEALTH STABILITY: MENTAL HEALTH: CURRENT SMOKER: 0

## 2025-01-28 ASSESSMENT — COLUMBIA-SUICIDE SEVERITY RATING SCALE - C-SSRS
2. HAVE YOU ACTUALLY HAD ANY THOUGHTS OF KILLING YOURSELF?: NO
1. IN THE PAST MONTH, HAVE YOU WISHED YOU WERE DEAD OR WISHED YOU COULD GO TO SLEEP AND NOT WAKE UP?: NO
6. HAVE YOU EVER DONE ANYTHING, STARTED TO DO ANYTHING, OR PREPARED TO DO ANYTHING TO END YOUR LIFE?: NO

## 2025-01-28 ASSESSMENT — PAIN SCALES - GENERAL
PAINLEVEL_OUTOF10: 2
PAINLEVEL_OUTOF10: 2
PAINLEVEL_OUTOF10: 0 - NO PAIN
PAIN_LEVEL: 0
PAINLEVEL_OUTOF10: 2

## 2025-01-28 ASSESSMENT — PAIN - FUNCTIONAL ASSESSMENT
PAIN_FUNCTIONAL_ASSESSMENT: 0-10

## 2025-01-28 NOTE — H&P
Procedure H&P    Patient Profile-Procedures  Name Tor Patino  Date of Birth 1975  MRN [id number]  Address   [address]  [facility] [id number]  [facility] [id number]    Primary Phone Number [phone]  Secondary Phone Number    Milind Magallon    Procedure(s):  Procedures: EGD and Colonoscopy  Primary contact name and number   Extended Emergency Contact Information  Primary Emergency Contact: JUAN LUIS PATINO   [facility]  Home Phone: [phone]  Relation: Father  Secondary Emergency Contact: Shashank Patino  Home Phone: [phone]  Relation: None    General Health  Weight There were no vitals filed for this visit.  BMI There is no height or weight on file to calculate BMI.    Allergies  Allergies   Allergen Reactions    Bee Pollen Unknown     Other reaction(s): Note:, Other: See Comments   hayfever like symptoms pt states, noise running, eyes watering. (only with new cut grass)       Past Medical History   Past Medical History:   Diagnosis Date    Endometriosis     Pulmonary embolism        Provider assessment  Diagnosis: Abdominal Pain  Medication Reviewed - yes  Prior to Admission medications    Medication Sig Start Date End Date Taking? Authorizing Provider   hydroCHLOROthiazide (HYDRODiuril) 25 mg tablet Take 1 tablet (25 mg) by mouth once daily. 1/6/25 1/6/26  Mauricio Bills MD   omeprazole (PriLOSEC) 40 mg DR capsule Take 1 capsule (40 mg) by mouth 2 times a day before meals. Do not crush or chew. 1/20/25 1/20/26  Jelly Rivera MD   oxyCODONE-acetaminophen (Percocet) 5-325 mg tablet Take 1 tablet by mouth every 8 hours if needed. 1/14/25 1/21/25  Historical Provider, MD       Physical Exam  There were no vitals filed for this visit.     General: A&Ox3, NAD.  HEENT: AT/NC.   CV: RRR. No murmur.  Resp: CTA bilaterally. No wheezing, rhonchi or rales.   GI: Soft, NT/ND. BSx4.  Extrem: No edema. Pulses intact.  Neuro: No focal deficits.   Psych: Normal mood  and affect.      Procedure Plan - pre-procedural (re)assesment completed by physician:  discharge/transfer patient when discharge criteria met    Jelly Rivera MD  1/28/2025 9:00 AM

## 2025-01-28 NOTE — ANESTHESIA PREPROCEDURE EVALUATION
Patient: Tor Patino    Procedure Information       Date/Time: 01/28/25 0930    Scheduled providers: Jelly Rivera MD    Procedures:       COLONOSCOPY      EGD    Location: [facility]            Relevant Problems   Cardiac   (+) Mixed hyperlipidemia      Pulmonary   (+) Pulmonary embolism      Neuro   (+) Anxiety   (+) Carpal tunnel syndrome      /Renal   (+) Chronic urinary tract infection   (+) Kidney stone      Liver   (+) Acute pancreatitis, unspecified complication status, unspecified pancreatitis type ([facility])      Endocrine   (+) Class 3 severe obesity due to excess calories without serious comorbidity with body mass index (BMI) of 40.0 to 44.9 in adult   (+) Goiter   (+) Obesity due to excess calories      Musculoskeletal   (+) Carpal tunnel syndrome      ID   (+) Chronic urinary tract infection      GYN   (+) Endometriosis       Clinical information reviewed:   Tobacco  Allergies  Meds   Med Hx  Surg Hx  OB Status  Fam Hx  Soc   Hx        NPO Detail:  NPO/Void Status  Date of Last Liquid: 01/28/25  Time of Last Liquid: 0500  Date of Last Solid: 01/26/25  Last Intake Type: Clear fluids         Physical Exam    Airway  Mallampati: III  TM distance: >3 FB  Neck ROM: full     Cardiovascular - normal exam  Rhythm: regular  Rate: normal     Dental - normal exam  (+) upper dentures, lower dentures     Pulmonary - normal exam  Breath sounds clear to auscultation     Abdominal - normal exam  (+) obese  Abdomen: soft         Anesthesia Plan    History of general anesthesia?: yes  History of complications of general anesthesia?: no    ASA 3     MAC     The patient is not a current smoker.    intravenous induction   Anesthetic plan and risks discussed with patient.    Plan discussed with CRNA.

## 2025-01-28 NOTE — DISCHARGE INSTRUCTIONS

## 2025-01-28 NOTE — ANESTHESIA POSTPROCEDURE EVALUATION
Patient: Tor Patino    Procedure Summary       Date: 01/28/25 Room / Location: [facility]    Anesthesia Start: 0941 Anesthesia Stop: 1025    Procedures:       COLONOSCOPY      EGD Diagnosis:       Abnormal CT scan      Lower abdominal pain    Scheduled Providers: Jelly Rivera MD Responsible Provider: VIN Joshi    Anesthesia Type: MAC ASA Status: 3            Anesthesia Type: MAC    Vitals Value Taken Time   /65 01/28/25 1027   Temp 36.2 01/28/25 1027   Pulse 82 01/28/25 1027   Resp 18 `   SpO2 95 01/28/25 1027       Anesthesia Post Evaluation    Patient location during evaluation: bedside  Patient participation: complete - patient participated  Level of consciousness: awake  Pain score: 0  Pain management: adequate  Airway patency: patent  Cardiovascular status: acceptable  Respiratory status: acceptable  Hydration status: acceptable  Postoperative Nausea and Vomiting: none      There were no known notable events for this encounter.

## 2025-02-03 LAB
LABORATORY COMMENT REPORT: NORMAL
PATH REPORT.COMMENTS IMP SPEC: NORMAL
PATH REPORT.FINAL DX SPEC: NORMAL
PATH REPORT.GROSS SPEC: NORMAL
PATH REPORT.RELEVANT HX SPEC: NORMAL
PATH REPORT.TOTAL CANCER: NORMAL

## 2025-02-10 ENCOUNTER — TELEPHONE (OUTPATIENT)
Dept: GASTROENTEROLOGY | Facility: CLINIC | Age: 50
End: 2025-02-10
Payer: COMMERCIAL

## 2025-02-10 DIAGNOSIS — R93.3 ABNORMAL ENDOSCOPY OF UPPER GASTROINTESTINAL TRACT: Primary | ICD-10-CM

## 2025-02-10 NOTE — TELEPHONE ENCOUNTER
I called patient to relay results of small intestine biopsies demonstrated mild increase in lymphocytes however  no distortion of villous architecture.  I discussed with patient the broad differential.  She reports she has not been on any NSAID medication for about 6 to 8 weeks.  She was previously on Toradol and ibuprofen which may be a culprit.  I discussed with her that we will test for celiac disease and if she does not respond to trial of PPI by upcoming visit will test for bacterial overgrowth as well.        Jelly Rivera MD

## 2025-02-17 ENCOUNTER — APPOINTMENT (OUTPATIENT)
Dept: ORTHOPEDIC SURGERY | Facility: CLINIC | Age: 50
End: 2025-02-17
Payer: COMMERCIAL

## 2025-02-17 DIAGNOSIS — M25.552 BILATERAL HIP PAIN: ICD-10-CM

## 2025-02-17 DIAGNOSIS — Z96.653 PRESENCE OF BILATERAL TOTAL KNEE JOINT PROSTHESES: Primary | ICD-10-CM

## 2025-02-17 DIAGNOSIS — M25.551 BILATERAL HIP PAIN: ICD-10-CM

## 2025-02-18 ENCOUNTER — APPOINTMENT (OUTPATIENT)
Dept: ORTHOPEDIC SURGERY | Facility: CLINIC | Age: 50
End: 2025-02-18
Payer: COMMERCIAL

## 2025-02-18 ENCOUNTER — APPOINTMENT (OUTPATIENT)
Dept: OBSTETRICS AND GYNECOLOGY | Facility: CLINIC | Age: 50
End: 2025-02-18
Payer: COMMERCIAL

## 2025-02-18 VITALS
BODY MASS INDEX: 40.97 KG/M2 | DIASTOLIC BLOOD PRESSURE: 76 MMHG | HEIGHT: 64 IN | SYSTOLIC BLOOD PRESSURE: 120 MMHG | WEIGHT: 240 LBS

## 2025-02-18 DIAGNOSIS — N95.1 VASOMOTOR SYMPTOMS DUE TO MENOPAUSE: ICD-10-CM

## 2025-02-18 DIAGNOSIS — Z86.718 HISTORY OF THROMBOEMBOLISM OF VEIN: Primary | ICD-10-CM

## 2025-02-18 PROCEDURE — 99214 OFFICE O/P EST MOD 30 MIN: CPT | Performed by: OBSTETRICS & GYNECOLOGY

## 2025-02-18 PROCEDURE — 3008F BODY MASS INDEX DOCD: CPT | Performed by: OBSTETRICS & GYNECOLOGY

## 2025-02-18 PROCEDURE — 1036F TOBACCO NON-USER: CPT | Performed by: OBSTETRICS & GYNECOLOGY

## 2025-02-18 RX ORDER — ESTRADIOL 0.5 MG/1
0.5 TABLET ORAL DAILY
COMMUNITY

## 2025-02-18 ASSESSMENT — ENCOUNTER SYMPTOMS
NAUSEA: 0
DYSURIA: 0
ABDOMINAL PAIN: 0
SHORTNESS OF BREATH: 0
CHILLS: 0
HEADACHES: 0
FEVER: 0
COUGH: 0
CONSTIPATION: 0
DIARRHEA: 0
PALPITATIONS: 0
HEMATURIA: 0
DIZZINESS: 0
WEAKNESS: 0
VOMITING: 0
FREQUENCY: 0

## 2025-02-18 ASSESSMENT — PAIN SCALES - GENERAL: PAINLEVEL_OUTOF10: 0-NO PAIN

## 2025-02-18 NOTE — PROGRESS NOTES
SUBJECTIVE    49 y.o.  Hysterectomy presents for   Chief Complaint   Patient presents with    Follow-up     Discuss hot flashes         Patient reports hot flashes have continued and are severe and affecting her sleep and quality of life.     OB/GYN History  No LMP recorded. Patient has had a hysterectomy.    Social History     Substance and Sexual Activity   Sexual Activity Not Currently    Partners: Male    Birth control/protection: Abstinence    Comment: I’m practicing celibacy       Sexually transmitted infections:no past history    OB History    Para Term  AB Living   3 1   1 2 1   SAB IAB Ectopic Multiple Live Births   1 1     1      # Outcome Date GA Lbr Justin/2nd Weight Sex Type Anes PTL Lv   3 SAB            2 IAB            1      M Vag-Spont   SHELTON       The following portions of the chart were reviewed this encounter and updated as appropriate:           Screenings  Social Drivers of Health     Tobacco Use: Medium Risk (2025)    Patient History     Smoking Tobacco Use: Former     Smokeless Tobacco Use: Never     Passive Exposure: Not on file   Alcohol Use: Not At Risk (2025)    AUDIT-C     Frequency of Alcohol Consumption: Never     Average Number of Drinks: Patient does not drink     Frequency of Binge Drinking: Never   Financial Resource Strain: Low Risk  (2025)    Overall Financial Resource Strain (CARDIA)     Difficulty of Paying Living Expenses: Not hard at all   Food Insecurity: No Food Insecurity (2025)    Hunger Vital Sign     Worried About Running Out of Food in the Last Year: Never true     Ran Out of Food in the Last Year: Never true   Transportation Needs: No Transportation Needs (2025)    PRAPARE - Transportation     Lack of Transportation (Medical): No     Lack of Transportation (Non-Medical): No   Physical Activity: Not on File (2019)    Received from [facility]    Physical Activity     Physical Activity: 0   Stress: Not on File  "(9/26/2019)    Received from [facility]    Stress     Stress: 0   Social Connections: Not on File (9/26/2019)    Received from [facility]    Social Connections     Social Connections and Isolation: 0   Intimate Partner Violence: Not At Risk (1/8/2025)    Humiliation, Afraid, Rape, and Kick questionnaire     Fear of Current or Ex-Partner: No     Emotionally Abused: No     Physically Abused: No     Sexually Abused: No   Depression: Not at risk (1/7/2025)    PHQ-2     PHQ-2 Score: 0   Housing Stability: Low Risk  (1/9/2025)    Housing Stability Vital Sign     Unable to Pay for Housing in the Last Year: No     Number of Times Moved in the Last Year: 0     Homeless in the Last Year: No   Utilities: Not At Risk (1/8/2025)    [facility] Utilities     Threatened with loss of utilities: No   Digital Equity: Not on file   Health Literacy: Not on file         Review of Systems  Review of Systems   Constitutional:  Negative for chills and fever.   Eyes:  Negative for visual disturbance.   Respiratory:  Negative for cough and shortness of breath.    Cardiovascular:  Negative for chest pain and palpitations.   Gastrointestinal:  Negative for abdominal pain, constipation, diarrhea, nausea and vomiting.   Endocrine: Positive for heat intolerance.   Genitourinary:  Negative for dyspareunia, dysuria, frequency, hematuria, urgency, vaginal bleeding and vaginal discharge.   Neurological:  Negative for dizziness, weakness and headaches.        OBJECTIVE  Vitals:    02/18/25 1435   BP: 120/76   Weight: 109 kg (240 lb)   Height: 1.626 m (5' 4\")     Body mass index is 41.2 kg/m².     Physical Exam  Constitutional:       Appearance: Normal appearance.   HENT:      Head: Normocephalic and atraumatic.      Nose: Nose normal.      Mouth/Throat:      Mouth: Mucous membranes are moist.   Eyes:      Extraocular Movements: Extraocular movements intact.      Pupils: Pupils are equal, round, and reactive to light.   Cardiovascular:      Rate and " Rhythm: Normal rate.   Pulmonary:      Effort: Pulmonary effort is normal.   Musculoskeletal:         General: Normal range of motion.      Cervical back: Normal range of motion.   Neurological:      General: No focal deficit present.      Mental Status: She is alert and oriented to person, place, and time.   Skin:     General: Skin is warm and dry.   Psychiatric:         Mood and Affect: Mood normal.         Behavior: Behavior normal.   Vitals and nursing note reviewed.          Last Pap:  status post hysterectomy    Immunization History   Administered Date(s) Administered    COVID-19, mRNA, LNP-S, PF, 30 mcg/0.3 mL dose 03/18/2021, 04/08/2021    Pfizer COVID-19 vaccine, 12 years and older, (30mcg/0.3mL) (Comirnaty) 11/16/2024    Pfizer Gray Cap SARS-CoV-2 07/29/2022      ASSESSMENT & PLAN  Problem List Items Addressed This Visit          Ob-Gyn Problems    History of thromboembolism of vein - Primary    Relevant Medications    fezolinetant 45 mg tablet    Vasomotor symptoms due to menopause    Overview     - Patient not a candidate for estrogen given VTE history  - Reviewed option for Veozah - recent liver function tests normal. Discussed must be tested every 3 months in the first year if use  - Reviewed SSRIs/SNRIs as next line. To trial paroxetine. Reviewed that IR paroxetine, gabapentin are other options         Relevant Medications    fezolinetant 45 mg tablet     Follow up: 3 months    Edna Yun MD  Obstetrics & Gynecology  02/18/25

## 2025-02-18 NOTE — PATIENT INSTRUCTIONS
Here is the link to get a savings card if your insurance:    [email or web address]_source=google&utm_medium=[email or web address]&utm_campaign=[employer]_VEOZ_[email or web address]_Search_DTC_Brand_BrandGeneral_Conversion_6994&utm_term=veozah+savings&utm_content=[employer]_B_BrandFinancialSupportB_Phrase_7161&gclsrc=aw.ds&&[id number]&gad_source=1   

## 2025-02-19 ENCOUNTER — OFFICE VISIT (OUTPATIENT)
Dept: ORTHOPEDIC SURGERY | Facility: CLINIC | Age: 50
End: 2025-02-19
Payer: COMMERCIAL

## 2025-02-19 ENCOUNTER — HOSPITAL ENCOUNTER (OUTPATIENT)
Dept: RADIOLOGY | Facility: EXTERNAL LOCATION | Age: 50
Discharge: HOME | End: 2025-02-19

## 2025-02-19 ENCOUNTER — HOSPITAL ENCOUNTER (OUTPATIENT)
Dept: RADIOLOGY | Facility: CLINIC | Age: 50
Discharge: HOME | End: 2025-02-19
Payer: COMMERCIAL

## 2025-02-19 VITALS — WEIGHT: 246 LBS | HEIGHT: 65 IN | BODY MASS INDEX: 40.98 KG/M2

## 2025-02-19 DIAGNOSIS — M70.61 GREATER TROCHANTERIC BURSITIS OF RIGHT HIP: ICD-10-CM

## 2025-02-19 DIAGNOSIS — M25.551 BILATERAL HIP PAIN: ICD-10-CM

## 2025-02-19 DIAGNOSIS — M54.59 OTHER LOW BACK PAIN: Primary | ICD-10-CM

## 2025-02-19 DIAGNOSIS — M25.552 BILATERAL HIP PAIN: ICD-10-CM

## 2025-02-19 PROCEDURE — 3008F BODY MASS INDEX DOCD: CPT | Performed by: FAMILY MEDICINE

## 2025-02-19 PROCEDURE — 99204 OFFICE O/P NEW MOD 45 MIN: CPT | Performed by: FAMILY MEDICINE

## 2025-02-19 PROCEDURE — 2500000004 HC RX 250 GENERAL PHARMACY W/ HCPCS (ALT 636 FOR OP/ED): Performed by: FAMILY MEDICINE

## 2025-02-19 PROCEDURE — 73521 X-RAY EXAM HIPS BI 2 VIEWS: CPT

## 2025-02-19 PROCEDURE — 1036F TOBACCO NON-USER: CPT | Performed by: FAMILY MEDICINE

## 2025-02-19 PROCEDURE — 99214 OFFICE O/P EST MOD 30 MIN: CPT | Mod: 25 | Performed by: FAMILY MEDICINE

## 2025-02-19 PROCEDURE — 20611 DRAIN/INJ JOINT/BURSA W/US: CPT | Mod: RT | Performed by: FAMILY MEDICINE

## 2025-02-19 RX ORDER — TRIAMCINOLONE ACETONIDE 40 MG/ML
40 INJECTION, SUSPENSION INTRA-ARTICULAR; INTRAMUSCULAR
Status: COMPLETED | OUTPATIENT
Start: 2025-02-19 | End: 2025-02-19

## 2025-02-19 RX ORDER — CYCLOBENZAPRINE HCL 5 MG
5 TABLET ORAL NIGHTLY PRN
Qty: 30 TABLET | Refills: 0 | Status: SHIPPED | OUTPATIENT
Start: 2025-02-19

## 2025-02-19 RX ORDER — LIDOCAINE HYDROCHLORIDE 20 MG/ML
2 INJECTION, SOLUTION INFILTRATION; PERINEURAL
Status: COMPLETED | OUTPATIENT
Start: 2025-02-19 | End: 2025-02-19

## 2025-02-19 RX ADMIN — TRIAMCINOLONE ACETONIDE 40 MG: 40 INJECTION, SUSPENSION INTRA-ARTICULAR; INTRAMUSCULAR at 10:15

## 2025-02-19 RX ADMIN — LIDOCAINE HYDROCHLORIDE 2 ML: 20 INJECTION, SOLUTION INFILTRATION; PERINEURAL at 10:15

## 2025-02-19 NOTE — PROGRESS NOTES
History of Present Illness   Chief Complaint   Patient presents with    Right Hip - Pain     X3WKS  NKI  RT GREATER THAN LT    Left Hip - Pain     X3WKS  NKI       The patient is 49 y.o. female  here with a complaint of low back pain as well as some bilateral hip pain.  Worsening symptoms over the past 1 month.  Pain is most prominent in the right low back with some pain extending into the more lateral aspect of her right hip.  Patient admits to consistent pain throughout the day, feels worse when she is bending forward, going from sitting to standing, standing to sitting, laying down, difficulty sleeping at night secondary to pain.  She denies any history of any back problems in the past.  She has been using Tylenol for pain control with minimal improvements, 1000 mg twice a day.  She would in the past use NSAIDs for pain but was diagnosed with pancreatitis in January of this past year and recommended that she stop using NSAIDs for pain control.  She admits to some pain in her left groin at times, less bothersome than her current symptoms in the right low back and posterior lateral hip.  She does have some chronic bilateral knee pain secondary to arthritis, she is status post patellofemoral right knee joint replacement, no surgery on her left knee.  She did have surgery done through the [facility], insurance no longer covers providers there.  She denies any lower extremity weakness, she does admit to some numbness in the lateral aspect of both hips.  No bowel or bladder dysfunction, no saddle anesthesia.    Past Medical History:   Diagnosis Date    Abnormal Pap smear of cervix 1990    CTS (carpal tunnel syndrome) 2016    Diverticulosis Unsure    Endometriosis     Fibroid 2017    GERD (gastroesophageal reflux disease)     Hyperlipidemia 01/13/1998    Hypertension     Irritable bowel syndrome Unsure    Kidney stone 2017    Liver disease     Pancreatitis ([facility]) 1/7/2025    Pulmonary embolism   "      Medication Documentation Review Audit       Reviewed by Inocencia Atwood LPN (Licensed Nurse) on 25 at 0819      Medication Order Taking? Sig Documenting Provider Last Dose Status   estradiol (Estrace) 0.5 mg tablet [id number] Yes Take 1 tablet (0.5 mg) by mouth once daily. Historical Provider, MD  Active   fezolinetant 45 mg tablet [id number]  Take 45 mg by mouth once daily. Edna Yun MD  Active   hydroCHLOROthiazide (HYDRODiuril) 25 mg tablet [id number] Yes Take 1 tablet (25 mg) by mouth once daily. Mauricio Bills MD 2025 Active   omeprazole (PriLOSEC) 40 mg DR capsule [id number]  Take 1 capsule (40 mg) by mouth 2 times a day before meals. Do not crush or chew. Jelly Rivera MD  Active                    Allergies   Allergen Reactions    Bee Pollen Unknown     Other reaction(s): Note:, Other: See Comments   hayfever like symptoms pt states, noise running, eyes watering. (only with new cut grass)       Social History     Socioeconomic History    Marital status: Single     Spouse name: Not on file    Number of children: Not on file    Years of education: Not on file    Highest education level: Not on file   Occupational History    Not on file   Tobacco Use    Smoking status: Former     Current packs/day: 0.00     Average packs/day: 1.5 packs/day for 15.0 years (22.5 ttl pk-yrs)     Types: Cigarettes     Quit date: 2017     Years since quittin.4    Smokeless tobacco: Never    Tobacco comments:     I quit   Vaping Use    Vaping status: Never Used   Substance and Sexual Activity    Alcohol use: Not Currently     Comment: I rarely drank    Drug use: Not Currently     Types: \"Crack\" cocaine, Heroin     Comment: 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    Sexual activity: Not Currently     Partners: Male     Birth control/protection: Abstinence     Comment: I’m practicing celibacy   Other Topics Concern    Not on file   Social History Narrative    Not on file     Social Drivers of Health "     Financial Resource Strain: Low Risk  (1/9/2025)    Overall Financial Resource Strain (CARDIA)     Difficulty of Paying Living Expenses: Not hard at all   Food Insecurity: No Food Insecurity (1/8/2025)    Hunger Vital Sign     Worried About Running Out of Food in the Last Year: Never true     Ran Out of Food in the Last Year: Never true   Transportation Needs: No Transportation Needs (1/9/2025)    PRAPARE - Transportation     Lack of Transportation (Medical): No     Lack of Transportation (Non-Medical): No   Physical Activity: Not on File (9/26/2019)    Received from [facility]    Physical Activity     Physical Activity: 0   Stress: Not on File (9/26/2019)    Received from [facility]    Stress     Stress: 0   Social Connections: Not on File (9/26/2019)    Received from [facility]    Social Connections     Social Connections and Isolation: 0   Intimate Partner Violence: Not At Risk (1/8/2025)    Humiliation, Afraid, Rape, and Kick questionnaire     Fear of Current or Ex-Partner: No     Emotionally Abused: No     Physically Abused: No     Sexually Abused: No   Housing Stability: Low Risk  (1/9/2025)    Housing Stability Vital Sign     Unable to Pay for Housing in the Last Year: No     Number of Times Moved in the Last Year: 0     Homeless in the Last Year: No       Past Surgical History:   Procedure Laterality Date    APPENDECTOMY      CHOLECYSTECTOMY      COLPOSCOPY  01/28/2025    HYSTERECTOMY  02/2019    LAPAROSCOPIC HYSTERECTOMY      TOTAL KNEE ARTHROPLASTY            Review of Systems   GENERAL: Negative  GI: Negative  MUSCULOSKELETAL: See HPI  SKIN: Negative  NEURO:  Negative     Physical Exam:    General/Constitutional: well appearing, no distress, appears stated age  HEENT: sclera clear  Respiratory: non labored breathing  Vascular: No edema, swelling or tenderness, except as noted in detailed exam.  Integumentary: No impressive skin lesions present, except as noted in detailed exam.  Neurological:   Alert and oriented   Psychological:  Normal mood and affect.  Musculoskeletal: Normal, except as noted in detailed exam and in HPI    Bilateral hips are normal in appearance, there is no rotational deformity or leg length discrepancy.  Patient has good passive range of motion with flexion, internal ex rotation on the right with exacerbation of posterior lateral hip and low back pain.  Similar for range of motion of the left hip without significant exacerbation of pain, mild pain in the groin with left hip internal rotation.  On the right she has notable tenderness palpation at the greater trochanter and along the gluteus medius muscle belly and myotendinous junction, there is some mild left lateral hip tenderness to palpation at the greater trochanter.  There is some pain and weakness with resisted right hip flexion, mild weakness with resisted hip abduction with pain laterally.  She has posterior lateral hip pain with both AMPARO and FADIR maneuver on the right, negative on the left.    Lumbar spine: There is some bilateral lower lumbar paraspinal muscle tenderness, no significant midline tenderness.  She has relatively preserved mobility with flexion, extension, bilateral twisting and sidebending, there is pain with flexion, extension and sidebending to the right.  There is some mild weakness somewhat diffusely throughout the lower extremity including ankle dorsiflexion, plantarflexion, knee flexion and extension as well as hip flexion, all 4 to 4+ out of 5 with exacerbation of her pain.  No sensory deficits are present bilaterally.  Straight leg raise test exacerbates her low back pain as does slump test on the right.       Imaging: X-rays of bilateral hips obtained today and independently viewed, joint spaces are well-preserved, no acute abnormalities are seen, limited view of lumbar spine shows minimal degenerative changes      L Inj/Asp: R greater trochanteric bursa on 2/19/2025 10:15 AM  Indications:  pain  Details: 22 G needle, ultrasound-guided lateral approach  Medications: 40 mg triamcinolone acetonide 40 mg/mL; 2 mL lidocaine 20 mg/mL (2 %)  Outcome: tolerated well, no immediate complications    Right hip bursa and surrounding structures were appropriately visualized before and during injection    Ultrasound images were permanently uploaded to the medical record/PACS  Procedure, treatment alternatives, risks and benefits explained, specific risks discussed. Consent was given by the patient. Immediately prior to procedure a time out was called to verify the correct patient, procedure, equipment, support staff and site/side marked as required. Patient was prepped and draped in the usual sterile fashion.             Assessment   1. Other low back pain  Referral to Physical Therapy      2. Greater trochanteric bursitis of right hip  Point of Care Ultrasound      3. Bilateral hip pain  XR hips bilateral 2 VW w pelvis when performed    cyclobenzaprine (Flexeril) 5 mg tablet            Plan: Patient with atraumatic low back pain more prominent on the right with some associated bilateral lateral hip pain.  Majority of symptoms do seem to be coming from her low back, likely some component of some functional low back pain, no red flag signs or symptoms, does have some degree of greater trochanteric pain syndrome contributing.  We discussed further workup and treatment.  I did place referral for physical therapy for her back and hip pain.  We did also proceed with ultrasound-guided right hip trochanteric bursa injection with Kenalog, hopeful this will write some relief on the right side.  I did send a prescription in for Flexeril to assist in pain control, she can use 1000 mg of Tylenol 3 times a day as needed for pain.  We discussed that if her back pain persist she may benefit from pain management evaluation.

## 2025-02-20 ENCOUNTER — APPOINTMENT (OUTPATIENT)
Dept: ENDOCRINOLOGY | Facility: CLINIC | Age: 50
End: 2025-02-20
Payer: COMMERCIAL

## 2025-02-20 VITALS
DIASTOLIC BLOOD PRESSURE: 88 MMHG | BODY MASS INDEX: 41.45 KG/M2 | TEMPERATURE: 97.5 F | WEIGHT: 248.8 LBS | HEART RATE: 93 BPM | HEIGHT: 65 IN | SYSTOLIC BLOOD PRESSURE: 121 MMHG

## 2025-02-20 DIAGNOSIS — Z00.00 HEALTH MAINTENANCE EXAMINATION: ICD-10-CM

## 2025-02-20 DIAGNOSIS — Z87.898 HISTORY OF PREDIABETES: ICD-10-CM

## 2025-02-20 DIAGNOSIS — E11.65 CONTROLLED TYPE 2 DIABETES MELLITUS WITH HYPERGLYCEMIA, WITHOUT LONG-TERM CURRENT USE OF INSULIN: ICD-10-CM

## 2025-02-20 DIAGNOSIS — E66.01 MORBID OBESITY WITH BMI OF 40.0-44.9, ADULT (MULTI): Primary | ICD-10-CM

## 2025-02-20 LAB
IGA SERPL-MCNC: 197 MG/DL (ref 47–310)
POC HEMOGLOBIN A1C: 6.9 % (ref 4.2–6.5)
TTG IGA SER-ACNC: <1 U/ML

## 2025-02-20 PROCEDURE — 3079F DIAST BP 80-89 MM HG: CPT | Performed by: NURSE PRACTITIONER

## 2025-02-20 PROCEDURE — 1036F TOBACCO NON-USER: CPT | Performed by: NURSE PRACTITIONER

## 2025-02-20 PROCEDURE — 83036 HEMOGLOBIN GLYCOSYLATED A1C: CPT | Performed by: NURSE PRACTITIONER

## 2025-02-20 PROCEDURE — 3008F BODY MASS INDEX DOCD: CPT | Performed by: NURSE PRACTITIONER

## 2025-02-20 PROCEDURE — 3074F SYST BP LT 130 MM HG: CPT | Performed by: NURSE PRACTITIONER

## 2025-02-20 PROCEDURE — 99215 OFFICE O/P EST HI 40 MIN: CPT | Performed by: NURSE PRACTITIONER

## 2025-02-20 RX ORDER — BLOOD-GLUCOSE SENSOR
EACH MISCELLANEOUS
Qty: 6 EACH | Refills: 3 | Status: SHIPPED | OUTPATIENT
Start: 2025-02-20

## 2025-02-20 RX ORDER — METFORMIN HYDROCHLORIDE 500 MG/1
500 TABLET ORAL
Qty: 30 TABLET | Refills: 2 | Status: SHIPPED | OUTPATIENT
Start: 2025-02-20 | End: 2025-05-21

## 2025-02-20 RX ORDER — PHENTERMINE HYDROCHLORIDE 37.5 MG/1
37.5 TABLET ORAL
Qty: 30 TABLET | Refills: 1 | Status: SHIPPED | OUTPATIENT
Start: 2025-02-20 | End: 2025-04-21

## 2025-02-20 ASSESSMENT — ENCOUNTER SYMPTOMS
DYSPHORIC MOOD: 0
PALPITATIONS: 0
NAUSEA: 0
WHEEZING: 0
NERVOUS/ANXIOUS: 0
FATIGUE: 0
ARTHRALGIAS: 0
POLYPHAGIA: 0
CONSTIPATION: 0
NUMBNESS: 0
POLYDIPSIA: 0
SHORTNESS OF BREATH: 0
FREQUENCY: 0

## 2025-02-20 ASSESSMENT — PATIENT HEALTH QUESTIONNAIRE - PHQ9
1. LITTLE INTEREST OR PLEASURE IN DOING THINGS: NOT AT ALL
SUM OF ALL RESPONSES TO PHQ9 QUESTIONS 1 AND 2: 0
2. FEELING DOWN, DEPRESSED OR HOPELESS: NOT AT ALL

## 2025-02-20 NOTE — PROGRESS NOTES
"Tor Patino presents for weight loss management and obesity consult today. She is a self referral into the weight management program.  She was initially interested in the gastric sleeve but has decided that she want a different     Patient notes she had recent pancreatitis 1/2/2025 cause was gallstone  Patient notes she is unaware of having SBO int he past  She is currently followed by GI and has her next consul on 3/3/25    Obesity History:  Childhood or teen obesity history: yes  Age of Onset: adolescence  Lowest adult weight: 200  Highest adult weight: 259  Current weight: 248     Family history of obesity: yes    Biggest challenge with weight management: \"changing my mindset, trying to keep it off\"  Goal\" \"I want to be around my grand kids, I want to be happy self\"     History of Weight Loss Efforts: yes  \"I could write a book on losing the weight, my problem is what I put in my mouth\"  Successful weight loss techniques attempted: prescription appetite suppressants: loss of 50 pounds and self-directed dieting  Unsuccessful weight loss techniques attempted:  Contrave    Current typical daily diet:  Typical Breakfast: skips  Typical Lunch: salad, vegetable, meat  Typical Dinner: varies, \"I don't always eat dinner\", recently banan, apple, humza seed smoothie  Soda/latte weekly intake:  Take out/carry out/fast food weekly: 2 times a month  Portion sizes/seconds with meals: medium    Snacking  Daytime snacks: no  Evening snacks: no  \"I am not a snacker\"    Describe Appetite (always hungry/no hunger/average): hunger before  Food noise: sometimes  Are you full after a meal?  yes  Emotional eater? No   Boredom eater? No     Current Exercise Habits  walking    Sleep patterns (insomnia, waking in night) /hours of sleep per night: no DARLENE  H/O Sleep apnea: no  Well rested?  sometimes    Increased Stressors (describe daily stress): no      Any intake of an appetite suppressant or an anti-obesity medicine? No     H/O " Pancreatitis: yes  H/O Thyroid Medullary Cancer: no    Past Medical History:   Diagnosis Date    Abnormal Pap smear of cervix 1990    CTS (carpal tunnel syndrome) 2016    Diverticulosis Unsure    Endometriosis     Fibroid 2017    GERD (gastroesophageal reflux disease)     Hyperlipidemia 01/13/1998    Hypertension     Irritable bowel syndrome Unsure    Kidney stone 2017    Liver disease     Pancreatitis ([facility]) 1/7/2025    Pulmonary embolism        Current Outpatient Medications   Medication Sig Dispense Refill    cyclobenzaprine (Flexeril) 5 mg tablet Take 1 tablet (5 mg) by mouth as needed at bedtime for muscle spasms. 30 tablet 0    estradiol (Estrace) 0.5 mg tablet Take 1 tablet (0.5 mg) by mouth once daily.      fezolinetant 45 mg tablet Take 45 mg by mouth once daily. 30 tablet 2    hydroCHLOROthiazide (HYDRODiuril) 25 mg tablet Take 1 tablet (25 mg) by mouth once daily. 90 tablet 3    omeprazole (PriLOSEC) 40 mg DR capsule Take 1 capsule (40 mg) by mouth 2 times a day before meals. Do not crush or chew. 60 capsule 11    blood-glucose sensor (FreeStyle Christi 3 Plus Sensor) device Change every 15 days 6 each 3    metFORMIN (Glucophage) 500 mg tablet Take 1 tablet (500 mg) by mouth 2 times daily (morning and late afternoon). 30 tablet 2    phentermine (Adipex-P) 37.5 mg tablet Take 1 tablet (37.5 mg) by mouth once daily in the morning. Take before meals. 30 tablet 1     No current facility-administered medications for this visit.           Review of Systems  Review of Systems   Constitutional:  Negative for fatigue.   Respiratory:  Negative for shortness of breath and wheezing.    Cardiovascular:  Negative for chest pain and palpitations.   Gastrointestinal:  Negative for constipation and nausea.   Endocrine: Negative for polydipsia, polyphagia and polyuria.   Genitourinary:  Negative for frequency and urgency.   Musculoskeletal:  Negative for arthralgias.   Skin:  Negative for rash.   Neurological:  Negative  "for numbness.   Psychiatric/Behavioral:  Negative for dysphoric mood. The patient is not nervous/anxious.            Objective   /88 (BP Location: Right arm, Patient Position: Sitting, BP Cuff Size: Adult)   Pulse 93   Temp 36.4 °C (97.5 °F) (Temporal)   Ht 1.651 m (5' 5\")   Wt 113 kg (248 lb 12.8 oz)   BMI 41.40 kg/m²     Physical Exam  Physical Exam  Vitals and nursing note reviewed.   Constitutional:       General: She is not in acute distress.     Appearance: Normal appearance. She is obese.   Neck:      Comments: Normal thyroid without goiter or nodules  Cardiovascular:      Rate and Rhythm: Normal rate and regular rhythm.      Heart sounds: No murmur heard.  Pulmonary:      Effort: Pulmonary effort is normal.      Breath sounds: Normal breath sounds.   Abdominal:      Palpations: Abdomen is soft.      Tenderness: There is no abdominal tenderness.   Skin:     General: Skin is warm and dry.   Neurological:      Mental Status: She is alert and oriented to person, place, and time.   Psychiatric:         Mood and Affect: Mood normal.         Behavior: Behavior normal.         Thought Content: Thought content normal.         Judgment: Judgment normal.         Lab Review  Lab Results   Component Value Date    HGBA1C 5.9 (H) 04/10/2024     Lab Results   Component Value Date    LDLCALC 83 10/01/2024       Weight Trends  Trends of weight reviewed in visit, see graph below:  Wt Readings from Last 3 Encounters:   02/20/25 113 kg (248 lb 12.8 oz)   02/19/25 112 kg (246 lb)   02/18/25 109 kg (240 lb)   (  Assessment/Plan     Follow up and Goals:    No GLP1 patient has  recent h/o acute pancreatitis       Nutrition: Dietitian consult. Take protein shake or coffee with protein shake with a fruit. Protein to aim for per meal is 20-30 grams . High protein example are: cottage cheese with berries, Greek yogurt with a fruit, hard boiled, tuna, chicken , turkey chili, combine high protein with a low carb fruit. Approach " your meal with the Healthy Plate approach.  Physical Exercise: Physical Exercise-YouTube or Apps: for free weight workouts- HasFit, Burn Boot Camps -do modifications. Building muscle mass is important with weight loss process and maintenance of weight. It also benefit bone health and strength and decreases falls risk and assists with balance.  It additionally increases our resting metabolic states and decreases risk of muscle wasting with weight loss and in use of the GLP1 medications, additionally taking in protein rich foods is important.  Medications: we discussed phentermine, please see the information sent in [employer]    We will need to have a patient agreement as this is a controlled substance  and you will need in person visit 1 time every 3 months for weight, heart rate, blood pressure to be obtained. A 5% weight loss will need to be achieved in the initial 12 week to remain on this medication.  Please call the [employer] Service for sensor if the insurance does not cover it: [phone]. See [employer] 3 Plus video [email or web address]  Please schedule with diabetes educator  Obtain lab work  Follow Up: With the dietitian and then the group visit, schedule a one on one         Problem List Items Addressed This Visit       History of prediabetes     Other Visit Diagnoses       Morbid obesity with BMI of 40.0-44.9, adult (Multi)    -  Primary    Relevant Medications    phentermine (Adipex-P) 37.5 mg tablet    Other Relevant Orders    Referral to Nutrition Services    Controlled type 2 diabetes mellitus with hyperglycemia, without long-term current use of insulin        Relevant Medications    metFORMIN (Glucophage) 500 mg tablet    blood-glucose sensor (FreeStyle Christi 3 Plus Sensor) device    Other Relevant Orders    POCT glycosylated hemoglobin (Hb A1C) manually resulted    Referral to Nutrition Services    Referral to Diabetes  Education    Health maintenance examination        Relevant Orders    POCT glycosylated hemoglobin (Hb A1C) manually resulted    Referral to Nutrition Services            Diet interventions: referral to dietitian for guidance in these changes  Discussed Mediterranean Diet, given pamphlet or it will be mailed, we looked at ADA plate, portion sizes, recipes. Advised to review in preparation for nutrition consult    Handouts given:  yes    Exercise intervention:   We discussed the importance of incorporating resistance and free weight  lifting into physical activity routine to prevent muscle wasting with weight loss, enhance bone health, the positive role increased muscle has in burning fat at rest. We looked at examples of these types of exercise routines online. Advised to do modifications. Advised to check with PCP if there is a h/o musculoskeletal injury or hx. We discussed benefits of walking with weight loss and as a cardio form of exercise. Gradually increase exercise to a goal of 150 minutes at least per week.    Follow Up:  Referred to nutrition or continue to work with current dietitian   Discussed obesity medications, side effects and mechanism of action reviewed with patient  Discussed physical activity: we reviewed [employer] videos for strength training, advised to use modifications for these videos, we discussed benefits of strength training and resistance bands  on bone and muscle health, we discussed walking and water aerobic options for cardio  Discussed Mediterranean Diet, given pamphlet or it will be mailed, we looked at ADA plate, portion sizes, recipes. Advised to review Mediterranean Meal Plan booklet  in preparation for nutrition consult  ....  1 month group visit and nutrition visit in person or  virtual  Please reach out if you need anything or have further questions

## 2025-02-20 NOTE — PATIENT INSTRUCTIONS
Nutrition: Dietitian consult. Take protein shake or coffee with protein shake with a fruit. Protein to aim for per meal is 20-30 grams . High protein example are: cottage cheese with berries, Greek yogurt with a fruit, hard boiled, tuna, chicken , turkey chili, combine high protein with a low carb fruit. Approach your meal with the Healthy Plate approach.  Physical Exercise: Physical Exercise-YouTube or Apps: for free weight workouts- HasFit, Burn Boot Camps -do modifications. Building muscle mass is important with weight loss process and maintenance of weight. It also benefit bone health and strength and decreases falls risk and assists with balance.  It additionally increases our resting metabolic states and decreases risk of muscle wasting with weight loss and in use of the GLP1 medications, additionally taking in protein rich foods is important.  Medications: we discussed phentermine, please see the information sent in [employer]    We will need to have a patient agreement as this is a controlled substance  and you will need in person visit 1 time every 3 months for weight, heart rate, blood pressure to be obtained. A 5% weight loss will need to be achieved in the initial 12 week to remain on this medication.  Please call the [employer] Customer Service for sensor if the insurance does not cover it: [phone]. See [employer] 3 Plus video [email or web address]=[email or web address]  Please schedule with diabetes educator  Obtain lab work  Follow Up: With the dietitian and then the group visit, schedule a one on one follow up diabetes 2 visit with me in one month

## 2025-02-25 ENCOUNTER — HOSPITAL ENCOUNTER (OUTPATIENT)
Dept: RADIOLOGY | Facility: CLINIC | Age: 50
Discharge: HOME | End: 2025-02-25
Payer: COMMERCIAL

## 2025-02-25 DIAGNOSIS — Z12.39 ENCOUNTER FOR SCREENING FOR MALIGNANT NEOPLASM OF BREAST, UNSPECIFIED SCREENING MODALITY: ICD-10-CM

## 2025-02-25 PROCEDURE — 77063 BREAST TOMOSYNTHESIS BI: CPT

## 2025-03-03 ENCOUNTER — HOSPITAL ENCOUNTER (OUTPATIENT)
Dept: RADIOLOGY | Facility: EXTERNAL LOCATION | Age: 50
Discharge: HOME | End: 2025-03-03

## 2025-03-03 ENCOUNTER — APPOINTMENT (OUTPATIENT)
Dept: GASTROENTEROLOGY | Facility: CLINIC | Age: 50
End: 2025-03-03
Payer: COMMERCIAL

## 2025-03-03 DIAGNOSIS — R92.8 ABNORMAL MAMMOGRAM OF LEFT BREAST: Primary | ICD-10-CM

## 2025-03-04 ENCOUNTER — APPOINTMENT (OUTPATIENT)
Dept: NUTRITION | Facility: CLINIC | Age: 50
End: 2025-03-04
Payer: COMMERCIAL

## 2025-03-05 ENCOUNTER — HOSPITAL ENCOUNTER (OUTPATIENT)
Dept: RADIOLOGY | Facility: CLINIC | Age: 50
Discharge: HOME | End: 2025-03-05
Payer: COMMERCIAL

## 2025-03-05 DIAGNOSIS — R92.8 ABNORMAL MAMMOGRAM OF LEFT BREAST: ICD-10-CM

## 2025-03-05 PROCEDURE — 77061 BREAST TOMOSYNTHESIS UNI: CPT | Mod: LT

## 2025-03-05 PROCEDURE — 76981 USE PARENCHYMA: CPT | Mod: LT

## 2025-03-05 PROCEDURE — 76642 ULTRASOUND BREAST LIMITED: CPT | Mod: LT

## 2025-03-06 ENCOUNTER — TELEMEDICINE CLINICAL SUPPORT (OUTPATIENT)
Dept: NUTRITION | Facility: CLINIC | Age: 50
End: 2025-03-06
Payer: COMMERCIAL

## 2025-03-06 DIAGNOSIS — E66.01 MORBID OBESITY WITH BMI OF 40.0-44.9, ADULT (MULTI): ICD-10-CM

## 2025-03-06 DIAGNOSIS — E11.65 CONTROLLED TYPE 2 DIABETES MELLITUS WITH HYPERGLYCEMIA, WITHOUT LONG-TERM CURRENT USE OF INSULIN: ICD-10-CM

## 2025-03-06 DIAGNOSIS — Z00.00 HEALTH MAINTENANCE EXAMINATION: ICD-10-CM

## 2025-03-06 PROCEDURE — 97802 MEDICAL NUTRITION INDIV IN: CPT | Mod: 95 | Performed by: NURSE PRACTITIONER

## 2025-03-06 NOTE — PATIENT INSTRUCTIONS
Nutrition Education Material: AND: Smart Tips to Power Up with Breakfast, Lifestyle Tips for Blood Sugar Control, Mindful Eating, and NCM: Weight Loss Tips  Provided patient with my office phone # and email address for any further questions.

## 2025-03-06 NOTE — PROGRESS NOTES
"Nutrition Assessment     Reason for Visit:  Tor Patino is a 49 y.o. female who presents for Obesity, DM2  Pt scheduled for a audio/visual virtual appointment. Identification was verified with 2 sources of identification. Patient was in [place] at time of visit.  HIPAA protocol was maintained.     Anthropometrics:   as of 2/20/2025  BMI: 41.40 kg/m² Abnormal   Height:  1.651 m (5' 5\")     Weight: 113 kg (248 lb 12.8 oz)     Significant Past Medical Hx: Anxiety, HLD, Neuropathy    Biochemical/Laboratory Data:  Lab Results   Component Value Date    HGBA1C 6.9 (A) 02/20/2025    CHOL 155 10/01/2024    TRIG 109 10/01/2024        Current Outpatient Medications:     blood-glucose sensor (FreeStyle Christi 3 Plus Sensor) device, Change every 15 days, Disp: 6 each, Rfl: 3    cyclobenzaprine (Flexeril) 5 mg tablet, Take 1 tablet (5 mg) by mouth as needed at bedtime for muscle spasms., Disp: 30 tablet, Rfl: 0    estradiol (Estrace) 0.5 mg tablet, Take 1 tablet (0.5 mg) by mouth once daily., Disp: , Rfl:     fezolinetant 45 mg tablet, Take 45 mg by mouth once daily., Disp: 30 tablet, Rfl: 2    hydroCHLOROthiazide (HYDRODiuril) 25 mg tablet, Take 1 tablet (25 mg) by mouth once daily., Disp: 90 tablet, Rfl: 3    metFORMIN (Glucophage) 500 mg tablet, Take 1 tablet (500 mg) by mouth 2 times daily (morning and late afternoon)., Disp: 30 tablet, Rfl: 2    omeprazole (PriLOSEC) 40 mg DR capsule, Take 1 capsule (40 mg) by mouth 2 times a day before meals. Do not crush or chew., Disp: 60 capsule, Rfl: 11    phentermine (Adipex-P) 37.5 mg tablet, Take 1 tablet (37.5 mg) by mouth once daily in the morning. Take before meals., Disp: 30 tablet, Rfl: 1     Pertinent Social Hx:  lives by herself, works full time at [employer], meals provided at center,      Food And Nutrient Intake:  Food and Nutrient History  Food and Nutrient History: Met w/ pt to obtain diet hx and instruct on diet strategies for weight loss d/t obesity and new dx of " DM2.  Started on Metformin and uses a CGM. Reports trying many diets in the past without longterm success.  States she would like to plan for more longterm healthy eating habits but she craves sugar and often goes 'overboard'.  Diet hx indicates inconsistent/skipping meals.  Limited planning of eals.  Fair intake of vegetables. Tends to choose higher fat foods.  Food Allergy:  (none)  Food Intolerance: lactose     Food Intake  Meal 1: usually skips - sometimes smoothie w/ fruit, humza and almond milk (no protein)  Snacks: cashews  Meal 2: gets meal provided at work:  sloppy brian, green beans, banana OR  shaan steak, mixed vegs, salad w/ blue cheese OR salad bar w/ chicken/tuna w/ blue cheese or ranch w/ banana  Meal 3: doesn't get home  until 8p - doesn't plan - may have cookies, may stop at [employer] and get [place] Wrap, may just have PB and celery or PBJ  Fluid Intake: water  Alcohol Intake Frequency (drinking days/week):  (none)    Food Preparation  Cooking: Patient  Grocery Shopping: Patient  Dining Out: Everyday    Bioactive Substance Intake  Alcohol Intake Frequency (drinking days/week):  (none)  Grocery Shopping Schedule: Has no shopping plan - goes as needed.   Convenience/Processed Foods: Prepared Foods from Grocery Store, Processed Salty Snacks, and Processed Sweet Snacks  daily  Cooking Skills/Barriers: Low preference for cooking  Meal Preparation Habits: Has cooking skills, but does little cooking and Little preplanning of meals  Eating Out Type: Lunch, Dinner, Fast Food, and Catered lunch at job   General Food Category Consumption Habits: Typically 1 serving fruit/d, May have a vegetable 1x/d, Tends to have meals with larger portions of grains/starches, Often eats higher fat meats, Eats fish regularly, Uses a milk alternative, Consumes sweets a couple times/week, Rarely consumes processed snack chips, Rarely/Occasionally drinks alcohol, and Rarely consumes sugar sweetened  beverages  Relationship with Food:   Appetite: Variable  Binging: Periods of restriction then overeating  Mindful Eating Habits: Periods of restriction then overeating    Physical Activities:  Physical Activity  Physical Activity History: has not worked out since Covid but has a gym membership    Nutrition Diagnosis      Nutrition Diagnosis  Patient has Nutrition Diagnosis: Yes  Diagnosis Status (1): New  Nutrition Diagnosis 1: Altered nutrition related to laboratory values  Related to (1): glycemic control  As Evidenced by (1): new dx of DM2 w/ A1C 6.9  Additional Nutrition Diagnosis: Diagnosis 2  Diagnosis Status (2): New  Nutrition Diagnosis 2: Obesity class III  Related to (2): energy intake in excess of energy expenditure  As Evidenced by (2): diet hx and BMI 41    Nutrition Interventions/Recommendations   Food and Nutrition Delivery  Meals & Snacks: General Healthful Diet, Carbohydrate-modified diet (30-45gm carb/meal)  Nutrition Education  Nutrition Education Content: Content related nutrition education  Goals: 1) Will do strength training 3x/wk  2) Will follow MyPlate guidelines for portions and balance when planning meals  3) Will aim to do meal prepping for 1 meals/d  4) Will start day with a protein forward (30gm) breakfast  Reviewed w/ pt diet guidelines for DM.  Discussed the following topics: benefit of consistent meals/snack times; foods containing carbohydrates; recommended carbohydrate amounts for meals and snacks; MyPlate menu planning and portions; food label reading; benefits of protein and fiber with meals and snacks; guidelines for intake of sweets; planning meals and snacks according to diet guidelines and benefits of exercise and movement.   Reviewed w/ pt strategies for weight loss including:  benefits of consistent meal and snack times;  mindful eating and paying attention to hunger and fullness cues;  MyPlate guidance for portions sizes;  strategies to increase fruits and vegetables in  diet;  strategies for limiting low nutritional value foods; benefits of, and strategies for, menu planning and prepping meals ahead of meal times; benefits of fiber and protein with meals and snacks; benefits of regular movement/exercise.     Nutrition Monitoring and Evaluation   Food and Nutrient Intake  Monitoring and Evaluation Plan: Meal/snack pattern, Amount of food, Carbohydrate intake  Amount of Food: Estimated amout of food, Types of food  Criteria: meals follow MyPlate guidelines  Meal/Snack Pattern: Estimated meal and snack pattern  Criteria: consistent meal times  Estimated carbohydrate intake: Estimated carbohydrate intake  Criteria: 30-45gm carb/meal  Knowledge Belief Attitude Determination   Monitoring and Evaluation Plan: Physical activity  Physical activity: Type of physical activity  Criteria: strength training 3x/wk  Biochemical Data, Medical Tests and Procedures  Monitoring and Evaluation Plan: Glucose/endocrine profile  Glucose/Endocrine Profile: Hemoglobin A1c (HgbA1c)  Criteria: recuction in A1C          Time Spent  Prep time on day of patient encounter: 5 minutes  Time spent directly with patient, family or caregiver: 60 minutes  Documentation Time: 10 minutes

## 2025-03-10 NOTE — PROGRESS NOTES
Reason for Visit:  Tor Patino is a 49 y.o. female who presents for {DSME Reason for Visit:74181}    DSME - Global Assessment    Marital Status: {marital status:96641}.  Support Person: {SUPPORT PERSON:61339}.    What do you hope to gain from this diabetes education visit? ***    Have you had diabetes education in the past?  {YES/NO/DONT KNOW:21137}.  In Your words, what is Diabetes: ***  What Concerns you most about having diabetes: ***    {Readiness to Learn:21142}  {Preferred learning method:21144}    How often do you need to have someone help you when you read instructions, pamphlets or other written material from your doctor or pharmacy?   Never  Rarely  Sometimes  Always  Declines to respond  Unable to respond    {Household Composition:21149}    Demographics:   {Difficulties with:21165}  {Highest Level of Education:21166}  {Race/Ethnic Origin:21167}  Occupation:  ***  Work hours: ***    Health Status:  {Smoking/Tobacco Use:21172}  {Alcohol Use:21173}    {Type of Diabetes:21174}  What year were you diagnosed with diabetes: ***  Family History: ***    Patient Active Problem List    Diagnosis Date Noted    History of prediabetes 02/20/2025    Vasomotor symptoms due to menopause 02/18/2025    Class 3 severe obesity due to excess calories without serious comorbidity with body mass index (BMI) of 40.0 to 44.9 in adult 01/08/2025    Acute pancreatitis, unspecified complication status, [facility] 01/07/2025    Chronic urinary tract infection 09/09/2024    Endometriosis 09/09/2024    Backache 09/09/2024    Kidney stone 09/09/2024    Small bowel obstruction (Multi) 09/09/2024    Constipation 03/27/2024    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    Benign neoplasm of skin of lower limb 01/15/2020    Carpal tunnel syndrome 01/15/2020    Goiter 01/15/2020    History of thromboembolism of vein 01/15/2020    Mixed hyperlipidemia 01/15/2020    Xerosis cutis 01/15/2020    Obesity due to excess calories  03/08/2019    Pelvic adhesive disease 01/30/2019    SOB (shortness of breath) 02/28/2016    Pulmonary embolism 02/24/2016    Neuropathy 02/01/2016    Anxiety 05/21/2015    Opioid type dependence, abuse (Multi) 06/11/2010    Leukocytosis 05/02/2009      Lab Results   Component Value Date    HGBA1C 6.9 (A) 02/20/2025    HGBA1C 5.9 (H) 04/10/2024    HGBA1C 6.1 (H) 06/20/2023    HGBA1C 5.6 09/19/2022    HGBA1C 5.3 07/01/2020       Health Utilization Past 12 Months:   Hospital Admissions: {Utilization:21185}  ER Visits: {Utilization:21185}  Primary Care Visits: {Utilization:21185}  {Last Eye Exam (Optional):74273}  {Podiatry (Optional):55318}  {Dentist (Optional):26033}    Diabetes Self-Management Skills and Behaviors:   {Do you exercise regularly?:04303}  {Physical Activity (Optional):21187}  {Do you get at least 7 hours of sleep each night?:52813}  {How do you manage your diabetes when you are sick?:84495}    {Diabetes Medications:21189}  Current Outpatient Medications   Medication Sig Dispense Refill    blood-glucose sensor (FreeStyle Christi 3 Plus Sensor) device Change every 15 days 6 each 3    cyclobenzaprine (Flexeril) 5 mg tablet Take 1 tablet (5 mg) by mouth as needed at bedtime for muscle spasms. 30 tablet 0    estradiol (Estrace) 0.5 mg tablet Take 1 tablet (0.5 mg) by mouth once daily.      fezolinetant 45 mg tablet Take 45 mg by mouth once daily. 30 tablet 2    hydroCHLOROthiazide (HYDRODiuril) 25 mg tablet Take 1 tablet (25 mg) by mouth once daily. 90 tablet 3    metFORMIN (Glucophage) 500 mg tablet Take 1 tablet (500 mg) by mouth 2 times daily (morning and late afternoon). 30 tablet 2    omeprazole (PriLOSEC) 40 mg DR capsule Take 1 capsule (40 mg) by mouth 2 times a day before meals. Do not crush or chew. 60 capsule 11    phentermine (Adipex-P) 37.5 mg tablet Take 1 tablet (37.5 mg) by mouth once daily in the morning. Take before meals. 30 tablet 1     No current facility-administered medications for this  visit.       DM medications as patient is taking them:      Injection/Infusion Sites: {Injection/Infusion:}. {Injection/Infusion:}    {Monitorn}        {Acute Complications-Safety:}    {Hypoglycemia:}  Hypoglycemia Treatment: ***    {Hyperglycemia:}  Hyperglycemia Treatment: ***    {Reproductive/Currently Pregnant (Optional):}  {Do you use birth control? (Optional):}  {Are you planning to become pregnant in the future? (Optional):}  {Have you reached Menopause? (Optional):}    Diabetes Assessment:   DM Interferes with other aspects of my life: {AgreeNeutralDisagree:}.  My level of stress is high: {AgreeNeutralDisagree:}.  I struggle with making changes in my life: {AgreeNeutralDisagree:}.  How do you handle stress: ***  Most difficult part of managing DM: ***    DSME - Meal Planning and Diet Recall  Are you currently following any meal plan: {MEAL PLAN:}.    Self-Identified challenges:     Does your culture or Yarsanism require any of the following: {Latter day/CULTURE     PLANNIN}  Who does the grocery shopping? {PERSONS:71578}  Who does the cooking in the home? {PERSONS:89525}    How often do you eat out? ***  How many meals do you eat in per day: {NUMBER:32060}.  Which meals do you tend to skip: {MEALS:}  What do you drink with and between meals: {DRINK:}    Diet Recall:   Meal 1:  Meal 2:  Meal 3:  Snacks: when in the day:     DSME - Goals and Recommendations    [facility] Diabetes Education Program SMART Behavior Goal Setting:        S - Specific: Exactly what do you want to do        M - Measurable: Use a calendar or chart to track progress        A - Attainable: Take small steps to make bigger changes        R - Realistic: Pick something reasonable that you know you can do        T - Time Oriented: Choose a time limit (No longer than 6 months)    Specific Goal:   {SPECIFIC GOAL:}    Measurable: How will I  track my goal:  I will keep track of my progress daily by {MEASURE GOAL:88549}.    Time Oriented: {TIME:01113}.    Topics Covered and Impression:    DSME Topics Covered During Visit:   {TOPICS:44945}    Reviewed Diabetes Technology: ***     DSME Topics for Follow-Up:   {TOPICS:13103}    Materials provided during today's visit: ***    Provider Impression: Seeing Endocrinology CNP for weight loss management. Has appointment with RD scheduled  The referring provider is within the same EMR and is able to see the DSMES provided and the outcomes.       Time: I personally spent a total of *** minutes with the patient providing diabetes self-management education. Visit documentation will be sent electronically to referring provider.

## 2025-03-11 ENCOUNTER — APPOINTMENT (OUTPATIENT)
Dept: NUTRITION | Facility: CLINIC | Age: 50
End: 2025-03-11
Payer: COMMERCIAL

## 2025-03-20 DIAGNOSIS — R10.30 LOWER ABDOMINAL PAIN: ICD-10-CM

## 2025-03-21 RX ORDER — OMEPRAZOLE 40 MG/1
40 CAPSULE, DELAYED RELEASE ORAL
Qty: 90 CAPSULE | Refills: 3 | Status: SHIPPED | OUTPATIENT
Start: 2025-03-21 | End: 2026-03-21

## 2025-03-25 ENCOUNTER — DOCUMENTATION (OUTPATIENT)
Dept: ENDOCRINOLOGY | Facility: CLINIC | Age: 50
End: 2025-03-25
Payer: COMMERCIAL

## 2025-03-25 ENCOUNTER — TELEPHONE (OUTPATIENT)
Dept: ENDOCRINOLOGY | Facility: CLINIC | Age: 50
End: 2025-03-25
Payer: COMMERCIAL

## 2025-03-25 DIAGNOSIS — E11.65 CONTROLLED TYPE 2 DIABETES MELLITUS WITH HYPERGLYCEMIA, WITHOUT LONG-TERM CURRENT USE OF INSULIN: Primary | ICD-10-CM

## 2025-03-25 RX ORDER — SEMAGLUTIDE 0.68 MG/ML
0.25 INJECTION, SOLUTION SUBCUTANEOUS
Qty: 3 ML | Refills: 3 | Status: SHIPPED | OUTPATIENT
Start: 2025-03-25

## 2025-03-25 NOTE — PROGRESS NOTES
Reason for Visit:  Tor Patino is a 49 y.o. female who presents for Initial DSME Visit     DSME - Global Assessment     Marital Status: single.  Support Person: sponsor.     What do you hope to gain from this diabetes education visit? Education about healthy eating. Hopefully one day having normal A1C.      Have you had diabetes education in the past?  No.  In Your words, what is Diabetes: blood sugars are elevated due to family history and what I eat  What Concerns you most about having diabetes: I don't want to have it     Readiness to Learn: demonstrates willingness to learn and demonstrates ability to learn  Preferred learning method: doing     How often do you need to have someone help you when you read instructions, pamphlets or other written material from your doctor or pharmacy?   Never     Household Composition: living independently, alone     Demographics:   Difficulties with: None  Highest Level of Education: Associates Degree  Race/Ethnic Origin: /Black  Occupation:    Work hours: 9-5     Health Status:  Smoking/Tobacco Use: No, patient does not smoke or use tobacco.  Alcohol Use: No, patient does not drink alcohol.     Type of Diabetes: Type 2  What year were you diagnosed with diabetes: recent  Family History: grandmother, dad          Patient Active Problem List     Diagnosis Date Noted    History of prediabetes 02/20/2025    Vasomotor symptoms due to menopause 02/18/2025    Class 3 severe obesity due to excess calories without serious comorbidity with body mass index (BMI) of 40.0 to 44.9 in adult 01/08/2025    Acute pancreatitis, unspecified complication status, unspecified pancreatitis type ([facility]) 01/07/2025    Chronic urinary tract infection 09/09/2024    Endometriosis 09/09/2024    Backache 09/09/2024    Kidney stone 09/09/2024    Small bowel obstruction (Multi) 09/09/2024    Constipation 03/27/2024    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    Benign neoplasm of skin of  lower limb 01/15/2020    Carpal tunnel syndrome 01/15/2020    Goiter 01/15/2020    History of thromboembolism of vein 01/15/2020    Mixed hyperlipidemia 01/15/2020    Xerosis cutis 01/15/2020    Obesity due to excess calories 03/08/2019    Pelvic adhesive disease 01/30/2019    SOB (shortness of breath) 02/28/2016    Pulmonary embolism 02/24/2016    Neuropathy 02/01/2016    Anxiety 05/21/2015    Opioid type dependence, abuse (Multi) 06/11/2010    Leukocytosis 05/02/2009            Lab Results   Component Value Date     HGBA1C 6.9 (A) 02/20/2025     HGBA1C 5.9 (H) 04/10/2024     HGBA1C 6.1 (H) 06/20/2023     HGBA1C 5.6 09/19/2022     HGBA1C 5.3 07/01/2020         Health Utilization Past 12 Months:   Hospital Admissions: Yes. [place]  ER Visits: Yes. For above  Primary Care Visits: Yes.  Last Eye Exam : patient reports annual screening by optometry/opthalmology  Podiatry : none  Dentist : Frequency: twice a year     Diabetes Self-Management Skills and Behaviors:   Do you exercise regularly?: No. Not in last 30 days. Normally I work out, but my energy has been low.   I used to walk or go to gym. Prefer outside walking and plan to restart doing that before work.   Yes  How do you manage your diabetes when you are sick?: unsure     Diabetes Medications: oral agents  Current Medications          Current Outpatient Medications   Medication Sig Dispense Refill    blood-glucose sensor (FreeStyle Christi 3 Plus Sensor) device Change every 15 days 6 each 3    cyclobenzaprine (Flexeril) 5 mg tablet Take 1 tablet (5 mg) by mouth as needed at bedtime for muscle spasms. 30 tablet 0    estradiol (Estrace) 0.5 mg tablet Take 1 tablet (0.5 mg) by mouth once daily.        fezolinetant 45 mg tablet Take 45 mg by mouth once daily. 30 tablet 2    hydroCHLOROthiazide (HYDRODiuril) 25 mg tablet Take 1 tablet (25 mg) by mouth once daily. 90 tablet 3    metFORMIN (Glucophage) 500 mg tablet Take 1 tablet (500 mg) by mouth 2 times daily  (morning and late afternoon). 30 tablet 2    omeprazole (PriLOSEC) 40 mg DR capsule Take 1 capsule (40 mg) by mouth 2 times a day before meals. Do not crush or chew. 60 capsule 11    phentermine (Adipex-P) 37.5 mg tablet Take 1 tablet (37.5 mg) by mouth once daily in the morning. Take before meals. 30 tablet 1      No current facility-administered medications for this visit.            DM medications as patient is taking them:    As highlighted above. Does not take with meals. Takes in AM and 5 pm. Educated to take with meals.       CGM - Christi 3           Acute Complications-Safety: no     Hypoglycemia: Frequency: Per Christi records had 6 incidents of low in past month: Notice CGM alarms low at night when I'm sleeping.   Hypoglycemia Treatment: not treating it and alarm stops     Hyperglycemia: None   Hyperglycemia Treatment: none     Reproductive/Currently Pregnant : No.  Do you use birth control? : No  Are you planning to become pregnant in the future? : No  Have you reached Menopause? : Yes     Diabetes Assessment:   DM Interferes with other aspects of my life: disagree.  My level of stress is high: disagree.  I struggle with making changes in my life: agree.   How do you handle stress: pray and meditate  Most difficult part of managing DM: eating healthy consistently     DSME - Meal Planning and Diet Recall  Are you currently following any meal plan: met with RD last month. Is watching carb intake        DSME - Goals and Recommendations     [facility] Diabetes Education Program SMART Behavior Goal Setting:        S - Specific: Exactly what do you want to do        M - Measurable: Use a calendar or chart to track progress        A - Attainable: Take small steps to make bigger changes        R - Realistic: Pick something reasonable that you know you can do        T - Time Oriented: Choose a time limit (No longer than 6 months)     Specific Goal:   I will exercise for 15 minutes five days per week.   I  can do this during lunch or in the morning.     2. I will eat breakfast 3-4 days per week.   Plan ahead food. My best results are when I meal prep     Measurable: How will I track my goal:  I will keep track of my progress daily by other.     Time Oriented: four weeks.     Topics Covered and Impression:     DSME Topics Covered During Visit:   A1c Review  Understanding Diabetes Basics  Reviewing Medication Classes  Taking Medications as Prescribed  Being Physically Active  Review Glycemic Goals (CGM or SMBG)  Reviewed Hypoglycemia Signs/Symptoms/Tx Plan  Reviewed Hyperglycemia Signs/Symptoms/Tx Plan  Healthy Meal Plan  CGM - when fingerstick is needed  CGM - interpreting results, what acronyms mean    Reviewed Diabetes Technology: Shown Christi view 2 week period. Looked at graph when blood sugar dips under 70.       DSME Topics for Follow-Up:   Being Physically Active  Review Glycemic Goals (CGM or SMBG)  Reviewed Hypoglycemia Signs/Symptoms/Tx Plan   CGM - when fingerstick is needed.   Getting a glucose meter to double checks CGM when results don't match symptoms or expectations.       Materials provided during today's visit:  DM patient guide, 2 handouts on exercise, blood glucose log sheet, A1C handout with patient's level written along with target level, hypo/hyperglycemia signs/symptoms & treatment for hypoglycemia. [employer] healthy meal planning booklet.      Provider Impression:  Patient with newly diagnosed T2DM. Seeing Endocrinology CNP for weight loss management. Also following with RD for this.   Christi 3 results shows her diabetes is very well managed. GMI 6.2% and last month A1C 6.9%. Shown graph and daily numbers. Regarding 6 episodes of lows in last 30 days patient denies any symptoms of low, however these do occur during sleep. Notice graph dips and then resumes to normal without her treating it. Explained these are likely compression lows.   Educated that when Christi alarms low to check in and see  "how she is feeling, if no symptoms of low and blood sugar returns to normal with position change no other action is needed. Sometimes she may need to treat for low blood sugar and explained steps for that.   She was not taking Metformin with meals and was tolerating well. Discussed reasons to take with breakfast and dinner. She will start. Does not eat breakfast. After discussion about benefit of eating something and quick ideas she now wants to start this. Says she may eat less at lunch as a result.   Asked a few times if diabetes can be cured or what is \"normal\" level for A1C and such. She would like to reduce her need for medication.  At the end of visit patient shared she will start Ozempic tonight at 0.5 mg. She has taken it before in past. She was trialed on Adipex for weight loss, but it made her depressed so she stopped.  Role and benefit of regular physical activity in terms of blood sugar, weight management and overall health discussed. Using the \"Move Your Way\" handout we discussed difference between aerobic and muscle strengthening and long term goals to work towards: 150 minutes of aerobic activity and 2 days muscle strengthening activity per week. Encouraged to do short intervals of exercise if needed to fit into schedule. Reviewed accessible and low cost options that don't require gym such as body weight & resistance band exercises and you tube videos. She does own resistance bands. Likes the idea of not going to gym because she often doesn't go. Does have access to one during lunch break and may take advantage of that. Also prefers to walk in the morning when she can. Provided examples of how to set small goals and gradually build up.     The referring provider is within the same EMR and is able to see the DSMES provided and the outcomes.         Time: I personally spent a total of 60 minutes with the patient providing diabetes self-management education. Visit documentation will be sent " electronically to referring provider.

## 2025-03-25 NOTE — TELEPHONE ENCOUNTER
Lab Results   Component Value Date    HGBA1C 6.9 (A) 02/20/2025     Ordering Ozempic  Desire follow up in one month   Also ordering diabetes education

## 2025-03-26 ENCOUNTER — NUTRITION (OUTPATIENT)
Dept: NUTRITION | Facility: CLINIC | Age: 50
End: 2025-03-26
Payer: COMMERCIAL

## 2025-03-26 ENCOUNTER — APPOINTMENT (OUTPATIENT)
Dept: ENDOCRINOLOGY | Facility: CLINIC | Age: 50
End: 2025-03-26
Payer: COMMERCIAL

## 2025-03-26 ENCOUNTER — TELEPHONE (OUTPATIENT)
Dept: ORTHOPEDIC SURGERY | Facility: CLINIC | Age: 50
End: 2025-03-26

## 2025-03-26 DIAGNOSIS — M25.551 BILATERAL HIP PAIN: ICD-10-CM

## 2025-03-26 DIAGNOSIS — M25.552 BILATERAL HIP PAIN: ICD-10-CM

## 2025-03-26 DIAGNOSIS — E11.65 CONTROLLED TYPE 2 DIABETES MELLITUS WITH HYPERGLYCEMIA, WITHOUT LONG-TERM CURRENT USE OF INSULIN: ICD-10-CM

## 2025-03-26 PROCEDURE — G0108 DIAB MANAGE TRN  PER INDIV: HCPCS | Performed by: EMERGENCY MEDICINE

## 2025-03-26 RX ORDER — CYCLOBENZAPRINE HCL 5 MG
5 TABLET ORAL NIGHTLY PRN
Qty: 30 TABLET | Refills: 0 | Status: SHIPPED | OUTPATIENT
Start: 2025-03-26

## 2025-03-26 NOTE — TELEPHONE ENCOUNTER
Pt is requesting a refill on cyclobenzaprine 5mg         [id number] - [place], [place] - [address] [facility]  [address] [place] [address]  Phone: [phone] Fax: [phone]    [facility] - [place], [place] - [address] [place] [id number]  Phone: [phone] Fax: [phone]

## 2025-03-27 DIAGNOSIS — E11.65 CONTROLLED TYPE 2 DIABETES MELLITUS WITH HYPERGLYCEMIA, WITHOUT LONG-TERM CURRENT USE OF INSULIN: ICD-10-CM

## 2025-03-27 RX ORDER — METFORMIN HYDROCHLORIDE 500 MG/1
TABLET ORAL
Qty: 60 TABLET | Refills: 5 | Status: SHIPPED | OUTPATIENT
Start: 2025-03-27

## 2025-04-08 ENCOUNTER — APPOINTMENT (OUTPATIENT)
Dept: ENDOCRINOLOGY | Facility: CLINIC | Age: 50
End: 2025-04-08
Payer: COMMERCIAL

## 2025-04-08 VITALS — HEIGHT: 64 IN | BODY MASS INDEX: 42.25 KG/M2 | WEIGHT: 247.5 LBS | RESPIRATION RATE: 18 BRPM

## 2025-04-08 DIAGNOSIS — E66.813 CLASS 3 SEVERE OBESITY WITHOUT SERIOUS COMORBIDITY WITH BODY MASS INDEX (BMI) OF 40.0 TO 44.9 IN ADULT, UNSPECIFIED OBESITY TYPE: Primary | ICD-10-CM

## 2025-04-08 DIAGNOSIS — E66.01 CLASS 3 SEVERE OBESITY WITHOUT SERIOUS COMORBIDITY WITH BODY MASS INDEX (BMI) OF 40.0 TO 44.9 IN ADULT, UNSPECIFIED OBESITY TYPE: Primary | ICD-10-CM

## 2025-04-08 DIAGNOSIS — E11.65 CONTROLLED TYPE 2 DIABETES MELLITUS WITH HYPERGLYCEMIA, WITHOUT LONG-TERM CURRENT USE OF INSULIN: ICD-10-CM

## 2025-04-08 DIAGNOSIS — R53.83 OTHER FATIGUE: ICD-10-CM

## 2025-04-08 PROCEDURE — 99214 OFFICE O/P EST MOD 30 MIN: CPT | Performed by: NURSE PRACTITIONER

## 2025-04-08 PROCEDURE — 1036F TOBACCO NON-USER: CPT | Performed by: NURSE PRACTITIONER

## 2025-04-08 PROCEDURE — 3008F BODY MASS INDEX DOCD: CPT | Performed by: NURSE PRACTITIONER

## 2025-04-08 PROCEDURE — 3044F HG A1C LEVEL LT 7.0%: CPT | Performed by: NURSE PRACTITIONER

## 2025-04-08 RX ORDER — SEMAGLUTIDE 1.34 MG/ML
1 INJECTION, SOLUTION SUBCUTANEOUS
Qty: 3 ML | Refills: 1 | Status: SHIPPED | OUTPATIENT
Start: 2025-04-13

## 2025-04-08 ASSESSMENT — ENCOUNTER SYMPTOMS
OCCASIONAL FEELINGS OF UNSTEADINESS: 0
DEPRESSION: 0
LOSS OF SENSATION IN FEET: 0

## 2025-04-08 ASSESSMENT — PATIENT HEALTH QUESTIONNAIRE - PHQ9
SUM OF ALL RESPONSES TO PHQ9 QUESTIONS 1 AND 2: 0
1. LITTLE INTEREST OR PLEASURE IN DOING THINGS: NOT AT ALL
2. FEELING DOWN, DEPRESSED OR HOPELESS: NOT AT ALL

## 2025-04-08 ASSESSMENT — PAIN SCALES - GENERAL: PAINLEVEL_OUTOF10: 0-NO PAIN

## 2025-04-08 NOTE — ED TRIAGE NOTES
[facility]  Endoscopy Preprocedure Instructions      1. On the day of your surgery, please report to registration located on the 2nd floor of the  the Main Hospital. yes    2. You must have a responsible adult to drive you to the hospital, stay at the hospital during your procedure and drive you home. If they leave your procedure will not be started (no exceptions). yes    3. Do not have anything to eat or drink (including water, gum, mints, coffee, and juice) after midnight. This does not apply to the medications you were instructed to take by your physician.yes  If you are currently taking Plavix, Coumadin, Aspirin, or other blood-thinning agents, contact your physician for special instructions. yes,eliquis    4. If you are having a procedure that requires bowel prep: We recommend that you have only clear liquids the day before your procedure and begin your bowel prep by 5:00 pm.  You may continue to drink clear liquids until midnight.  If for any reason you are not able to complete your prep please notify your physician immediately. yes    5. Have a list of all current medications, including vitamins, herbal supplements and any other over the counter medications. Reviewed over the phone    6. If you wear glasses, contacts, dentures and/or hearing aids, they may be removed prior to procedure, please bring a case to store them in. yes    7. You should understand that if you do not follow these instructions your procedure may be cancelled.  If your physical condition changes (I.e. fever, cold or flu) please contact your doctor as soon as possible.    8. It is important that you be on time.  If for any reason you are unable to keep your appointment please call [phone] the day of or your physician’s office prior to your scheduled procedure    9. Have you received your COVID Vaccine? no If no, you will need to receive a COVID test/swab here at [facility] the [facility] [place] Monday - Friday 8a -  The patient was seen and examined in triage.    History of Present Illness: The patient is a 48-year-old female presents emergency department for assessment of multiple complaints.  Her first complaint is urinary symptoms.  This started 2 days ago.  She reports dysuria, hematuria, urinary frequency, urinary hesitancy, urinary urgency.  She reports that she has had UTIs multiple times in the past and she started herself on Cipro that she had at home.  She reports that she is also getting bilateral flank pain worse on the left side.  She has had kidney stones and kidney infections in the past.  She has not had fever or chills.  She reports that today she developed chest pain that radiated down to her left arm that made her become concerned.  She denies any fever.  She denies cough or congestion.  She denies any associated shortness of breath, nausea, or vomiting.    Brief Physical Exam:  Exam is limited by the patient sitting in a chair in triage.   Heart: Regular rate and rhythm.  Radial pulses +2/4 bilaterally.  Lungs: Clear to auscultation bilaterally.   Abdomen: Soft, nondistended, normoactive bowel sounds, nontender.  No CVA tenderness bilaterally.    Plan: Appropriate labs and diagnostic imaging were ordered.      For the remainder of the patient's workup and ED course, please refer to the main ED provider note. We discussed need for diagnostic testing including laboratory studies and imaging.  We also discussed that they may be asked to wait in the waiting room while these tests are pending.  They understand that if they choose to leave without having the testing completed or resulted that we cannot rule out acute life threatening illnesses and the risks involved could lead to worsening condition, permanent disability or even death.      Disclaimer: This note was dictated by speech recognition. Minor errors in transcription may be present. Please call if questions.

## 2025-04-08 NOTE — PATIENT INSTRUCTIONS
New Goals:  Nutrition- Healthy Plate and focus on 45 grams pf carbs with meals, Medication- start the Ozempic 1 mg, and Other- labs, have first group visit

## 2025-04-08 NOTE — PROGRESS NOTES
"This is a virtual visit where physical exam is limited and ROS and hx is obtained.    Subjective  Tor Patino is a 49 y.o. female with a hx of obesity and diabetes who presents for weight management and obesity medicine follow up.    She began the phentermine after her last visit on 2/20/2025, it created depression and has resolved since she stopped it. She had similar issues with Contrave.    She had an acute abdominal pain episode in January 2025. It was thought to be galls stone in the gall bladder duct. At this time the her Lipase value was elevated which lead to increased Lipase , her imaging of the pancreas as noted below noted no abnormalities.    Current Plan  1. Nutrition: Mediterranean Diet and Healthy Plate   stopped eating fried food and pork since having since having the abdominal flare   Cottage cheese with fruit in the AM    2. Sleep: Stable    Improved since stopping the phentermine    3. Stress: Stable   \"baseline no change\"    4. Exercise: Incorporating consistently-      She says she does something physical every day but has fatigue  Her exercises do include strength and resistance    5. Appetite control: Increased  \"My appetite is decreasing and my portions are msaller\"    Obesity medication: Ozempic- .5 mg Ozempic   She began 3 weeks ago on the .5mg   No side effects    6. Prior Goals: Met  Nutrition- RD visit(met), protein shake in the morning with fruit (still working on it)  Physical activity- Initiate routine with strength focus  Medications- started  New Goals: Nutrition- Healthy Plate and focus on 45 grams pf carbs with meals, Medication- start the Ozempic 1 mg, and Other- labs, have first group visit    Weight trend:    Wt Readings from Last 3 Encounters:   04/08/25 112 kg (247 lb 8 oz)   02/20/25 113 kg (248 lb 12.8 oz)   02/19/25 112 kg (246 lb)     Lab Results   Component Value Date    HGBA1C 6.9 (A) 02/20/2025     FIB-4 Calculation: 0.61 at 1/9/2025  6:01 AM  Calculated " from:  SGOT/AST: 16 U/L at 1/9/2025  6:01 AM  SGPT/ALT: 21 U/L at 1/9/2025  6:01 AM  Platelets: 277 x10*3/uL at 1/9/2025  6:01 AM  Age: 49 years    MRCP 1/8/2025:  PANCREAS:  Acute inflammatory change: Negative  Morphologic changes of chronic pancreatitis: Negative  Peripancreatic collection: Negative  Main duct dilation: Negative  Pancreas divisum: Negative  Solid mass: No obvious solid mass, noting sensitivity and specificity  are both limited without IV contrast Cyst / cystic lesion: Negative    Recent weight:      Current Outpatient Medications:     blood-glucose sensor (FreeStyle Christi 3 Plus Sensor) device, Change every 15 days, Disp: 6 each, Rfl: 3    cyclobenzaprine (Flexeril) 5 mg tablet, Take 1 tablet (5 mg) by mouth as needed at bedtime for muscle spasms., Disp: 30 tablet, Rfl: 0    estradiol (Estrace) 0.5 mg tablet, Take 1 tablet (0.5 mg) by mouth once daily., Disp: , Rfl:     fezolinetant 45 mg tablet, Take 45 mg by mouth once daily., Disp: 30 tablet, Rfl: 2    hydroCHLOROthiazide (HYDRODiuril) 25 mg tablet, Take 1 tablet (25 mg) by mouth once daily., Disp: 90 tablet, Rfl: 3    metFORMIN (Glucophage) 500 mg tablet, TAKE 1 TABLET(500 MG) BY MOUTH TWICE DAILY IN THE MORNING AND LATE AFTERNOON, Disp: 60 tablet, Rfl: 5    omeprazole (PriLOSEC) 40 mg DR capsule, Take 1 capsule (40 mg) by mouth once daily in the morning. Take before meals., Disp: 90 capsule, Rfl: 3    [START ON 4/13/2025] semaglutide (Ozempic) 1 mg/dose (4 mg/3 mL) pen injector, Inject 1 mg under the skin 1 (one) time per week., Disp: 3 mL, Rfl: 1    ROS:  System: normal  Eyes : no visual changes  Neck : no tenderness, no new lumps/bumps  Respiratory : no SOB  Cardiovascular : no chest pain, no palpitations  Gastro-Intestinal : no abdominal concerns  Neurological : no numbness or tingling in the extremities  Musculoskeletal : no joint paint, no muscle pain  Skin : no unusual rashes  Psychiatric : no depression, no anxiety  See HPI for  "Endocrine ROS    Past Medical History:   Diagnosis Date    Abnormal Pap smear of cervix     CTS (carpal tunnel syndrome) 2016    Diverticulosis Unsure    Endometriosis     Fibroid 2017    GERD (gastroesophageal reflux disease)     Hyperlipidemia 1998    Hypertension     Irritable bowel syndrome Unsure    Kidney stone 2017    Liver disease     Pancreatitis ([facility]-HCC) 2025    Pulmonary embolism        Past Surgical History:   Procedure Laterality Date    APPENDECTOMY      CHOLECYSTECTOMY      COLPOSCOPY  2025    HYSTERECTOMY  2019    LAPAROSCOPIC HYSTERECTOMY      TOTAL KNEE ARTHROPLASTY         Social History     Socioeconomic History    Marital status: Single     Spouse name: Not on file    Number of children: Not on file    Years of education: Not on file    Highest education level: Not on file   Occupational History    Not on file   Tobacco Use    Smoking status: Former     Current packs/day: 0.00     Average packs/day: 1.5 packs/day for 15.0 years (22.5 ttl pk-yrs)     Types: Cigarettes     Quit date: 2017     Years since quittin.6    Smokeless tobacco: Never    Tobacco comments:     I quit   Vaping Use    Vaping status: Never Used   Substance and Sexual Activity    Alcohol use: Not Currently     Comment: I rarely drank    Drug use: Not Currently     Types: \"Crack\" cocaine, Heroin     Comment: 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    Sexual activity: Not Currently     Partners: Male     Birth control/protection: Abstinence     Comment: I’m practicing celibacy   Other Topics Concern    Not on file   Social History Narrative    Not on file     Social Drivers of Health     Financial Resource Strain: Low Risk  (2025)    Overall Financial Resource Strain (CARDIA)     Difficulty of Paying Living Expenses: Not hard at all   Food Insecurity: No Food Insecurity (2025)    Hunger Vital Sign     Worried About Running Out of Food in the Last Year: Never true     Ran Out of Food in the Last " "Year: Never true   Transportation Needs: No Transportation Needs (1/9/2025)    PRAPARE - Transportation     Lack of Transportation (Medical): No     Lack of Transportation (Non-Medical): No   Physical Activity: Not on File (9/26/2019)    Received from [facility]    Physical Activity     Physical Activity: 0   Stress: Not on File (9/26/2019)    Received from [facility]    Stress     Stress: 0   Social Connections: Not on File (9/26/2019)    Received from [facility]    Social Connections     Social Connections and Isolation: 0   Intimate Partner Violence: Not At Risk (1/8/2025)    Humiliation, Afraid, Rape, and Kick questionnaire     Fear of Current or Ex-Partner: No     Emotionally Abused: No     Physically Abused: No     Sexually Abused: No   Housing Stability: Low Risk  (1/9/2025)    Housing Stability Vital Sign     Unable to Pay for Housing in the Last Year: No     Number of Times Moved in the Last Year: 0     Homeless in the Last Year: No       Objective      Physical Exam:  Resp. rate 18, height 1.626 m (5' 4\"), weight 112 kg (247 lb 8 oz).  General : alert and oriented X3, no acute distress  Eyes : EOMI     Assessment/Plan   Tor Patino is a 49 y.o. female with a hx of obesity and diabetes 2 who presents for follow up for weight management and obesity medicine visit.    A/P Follow up:    Stable weight    Problem List Items Addressed This Visit       Class 3 severe obesity without serious comorbidity with body mass index (BMI) of 40.0 to 44.9 in adult - Primary    Controlled type 2 diabetes mellitus with hyperglycemia, without long-term current use of insulin    Relevant Medications    semaglutide (Ozempic) 1 mg/dose (4 mg/3 mL) pen injector (Start on 4/13/2025)    Other Relevant Orders    Follow Up In Endocrinology    Hemoglobin A1C    Other fatigue    Relevant Orders    Vitamin D 25-Hydroxy,Total (for eval of Vitamin D levels)         New Goals:   Nutrition- Healthy Plate and focus on 45 grams pf " carbs with meals, Medication- start the Ozempic 1 mg, and Other- labs, have first group visit      Weight Management : Reviewed the principles of energy metabolism, caloric intake and expenditure, and rationale for a treatment program.  Also reinforced need for reduced calorie, low fat diet and increased physical activity.    Follow up in a group or individual visit as determined.    Emilia oDminguez, APRN-CNP

## 2025-04-09 ENCOUNTER — APPOINTMENT (OUTPATIENT)
Dept: ENDOCRINOLOGY | Facility: CLINIC | Age: 50
End: 2025-04-09
Payer: COMMERCIAL

## 2025-04-09 VITALS — WEIGHT: 243 LBS | BODY MASS INDEX: 40.48 KG/M2 | HEIGHT: 65 IN

## 2025-04-09 DIAGNOSIS — Z71.3 DIETARY COUNSELING: ICD-10-CM

## 2025-04-09 DIAGNOSIS — E11.9 TYPE 2 DIABETES MELLITUS WITHOUT COMPLICATION, WITHOUT LONG-TERM CURRENT USE OF INSULIN: Primary | ICD-10-CM

## 2025-04-09 PROCEDURE — 97802 MEDICAL NUTRITION INDIV IN: CPT | Performed by: DIETITIAN, REGISTERED

## 2025-04-09 NOTE — PROGRESS NOTES
"Initial Nutrition Assessment    Patient was referred to nutrition by ANDREA Campos  for  weight management/desire to lose weight, as well as for education on healthy eating for consideration of Type 2 DM. Other PMHX significant for HLD and Goiter. Pertinent labs reviewed which show recent A1c of 6.9%.     Diet recall reveals a consistent meal pattern of 2-3 meals per day, as well as recent implementation of well balanced meals with attention to consistent lean protein, fruits/vegetables/complex CHO, and healthy fat food sources in appropriate portions to assist in achieving goals, and if maintained, weight loss likely to be achieved. Fluids meeting recommendations in type and amount with water as primary beverage. Pt is incorporating consistent physical activity, meeting lower end of weekly recommendations. See all interventions/recommendations below as discussed during visit this day.     Patient reported symptoms: Difficulty losing weight    Anthropometrics:  Height:   Ht Readings from Last 1 Encounters:   04/08/25 1.626 m (5' 4\")      Weight:   Wt Readings from Last 10 Encounters:   04/08/25 112 kg (247 lb 8 oz)   02/20/25 113 kg (248 lb 12.8 oz)   02/19/25 112 kg (246 lb)   02/18/25 109 kg (240 lb)   01/28/25 109 kg (240 lb)   01/20/25 108 kg (238 lb 11.2 oz)   01/08/25 110 kg (241 lb 6.5 oz)   01/07/25 108 kg (238 lb 6.4 oz)   12/04/24 107 kg (235 lb 6.4 oz)   11/26/24 107 kg (235 lb)      Current BMI:   BMI Readings from Last 1 Encounters:   04/08/25 42.48 kg/m²        Labs:  Lab Results   Component Value Date    HGBA1C 6.9 (A) 02/20/2025      Lab Results   Component Value Date    CHOL 155 10/01/2024     Lab Results   Component Value Date    HDL 50.1 10/01/2024     Lab Results   Component Value Date    LDLCALC 83 10/01/2024     Lab Results   Component Value Date    TRIG 109 10/01/2024       Malnutrition Screening:  Significant Unintentional weight loss: No  Eating less than 75% of usual intake for " more than 2 weeks: No  Potential Signs of Inflammation: No    Recommended Malnutrition Diagnosis: No malnutrition identified    Diet Recall-  Breakfast- cottage cheese and fruit  Lunch- salad with various proteins and cooked vegetables and a serving of fruit  Dinner- protein bar OR turkey sandwich   Daily Snacks- fruit, veggies and keto chocolates  Beverages- coffee and water throughout the day (80 ounces)  Alcohol- none  Physical Activity- walking and doing some resistance training    Other pertinent patient reported Information:  - Pt currently taking medication Semaglutide with positive appetite suppression.  - Has been a snacker so trying to make an effort to reduce such recently.     Nutrition Diagnosis: Food and nutrition-related knowledge deficit related to lack of recent exposure to information as evidenced by BMI above normative standard for age and gender.    Readiness to Learn:  Cognitive ability: Alert and oriented  Motivation to learn: Interested  Family Support: Unable to assess- family not present  Instruction provided to: Patient  Patient learns best by: Multiple methods  Factors affecting learning: None   Physical limitations affecting learning: None    Education Materials Provided:   Your Mediterranean Meal Plan Booklet    Nutrition Interventions/Recommendations for 4/9/2025:  - Please refer to your book entitled: Your Mediterranean Meal Plan, and follow Mediterranean Diet eating guidelines as reviewed.  - The Healthy Plate style of eating can be a helpful tool for incorporating healthy balanced meals in appropriate portions. (Healthy Plate: Start with a 9-inch diameter plate. Fill 1/2 the plate with non-starchy vegetables, 1/4 of the plate with whole grains or starchy vegetables, and 1/4 of the plate with a lean source of protein.   - Please aim for a source of healthy protein and fiber rich foods at meals as discussed for nutrition needs as well as to help provide better satiety at meals.   -  Consider pre-planning healthy meals for the week. Refer to your book for both menu and recipe ideas to get you started.  - Consider tracking daily food intake for accountability with food choices and portions and aim for 7400-1544 calories daily.  - Keep up the great work with your water intakes.  - Aim for 150 minutes of moderate-intensity physical activity per week. Resistance training is encouraged at least twice weekly.  - Follow-up as scheduled for the group classes in May.      Nutrition Monitoring & Evaluation: adherence to recommendations and patient stated goals    Need for follow-up: As scheduled for [facility] Shared Medical Appointment ([facility])    Referred by: ANDREA Campos     MNT Billing Type: Medical Nutrition Assessment, each 15 min increment, for 3 increments.    SIGNATURE:   Montserrat Weber RD, TAMMIE, LD, [facility]                                                        DATE:   4/9/2025

## 2025-04-09 NOTE — PATIENT INSTRUCTIONS
- Please refer to your book entitled: Your Mediterranean Meal Plan, and follow Mediterranean Diet eating guidelines as reviewed.  - The Healthy Plate style of eating can be a helpful tool for incorporating healthy balanced meals in appropriate portions. (Healthy Plate: Start with a 9-inch diameter plate. Fill 1/2 the plate with non-starchy vegetables, 1/4 of the plate with whole grains or starchy vegetables, and 1/4 of the plate with a lean source of protein.   - Please aim for a source of healthy protein and fiber rich foods at meals as discussed for nutrition needs as well as to help provide better satiety at meals.   - Consider pre-planning healthy meals for the week. Refer to your book for both menu and recipe ideas to get you started.  - Consider tracking daily food intake for accountability with food choices and portions and aim for 7141-4910 calories daily.  - Keep up the great work with your water intakes.  - Aim for 150 minutes of moderate-intensity physical activity per week. Resistance training is encouraged at least twice weekly.  - Follow-up as scheduled for the group classes in May.

## 2025-04-10 ENCOUNTER — TELEPHONE (OUTPATIENT)
Dept: ENDOCRINOLOGY | Facility: CLINIC | Age: 50
End: 2025-04-10

## 2025-04-10 ENCOUNTER — APPOINTMENT (OUTPATIENT)
Dept: NUTRITION | Facility: CLINIC | Age: 50
End: 2025-04-10
Payer: COMMERCIAL

## 2025-04-10 NOTE — TELEPHONE ENCOUNTER
Completed prior authorization for Ozempic 1mg dose via epic  Denied       Spoke to patient's insurance   Insurance stated that patient picked up 0.25mg on 3/25/25 per their records  Explained to representative that NP prescribed new dose   Insurance stated that patient's pharmacy would need to call insurance pharmacy help desk to request a therapy change override for the new dose.   This nurse spoke to patient's [facility] pharmacy  [facility] stated that they would call patient's insurance pharmacy help desk to request override.

## 2025-04-10 NOTE — TELEPHONE ENCOUNTER
Deisy contacted our office stating that patient's insurance requires our office to complete override  This nurse contacted patient's insurance and they stated that it was not able to be done  Patient will need to wait until 4/16/25 to  new dose  Spoke with patient and notified them of communication with insurance and pharmacy  Patient verbalized good understanding.

## 2025-04-18 LAB — 25(OH)D3+25(OH)D2 SERPL-MCNC: 45 NG/ML (ref 30–100)

## 2025-04-19 LAB
ALBUMIN/CREAT UR: NORMAL
CHOLEST SERPL-MCNC: 278 MG/DL
CHOLEST/HDLC SERPL: 4.7 (CALC)
CREAT UR-MCNC: NORMAL MG/DL
HDLC SERPL-MCNC: 59 MG/DL
LDLC SERPL CALC-MCNC: 188 MG/DL (CALC)
MICROALBUMIN UR-MCNC: NORMAL
NONHDLC SERPL-MCNC: 219 MG/DL (CALC)
TRIGL SERPL-MCNC: 163 MG/DL

## 2025-04-21 ENCOUNTER — DOCUMENTATION (OUTPATIENT)
Dept: ENDOCRINOLOGY | Facility: CLINIC | Age: 50
End: 2025-04-21
Payer: COMMERCIAL

## 2025-04-21 DIAGNOSIS — E78.2 MIXED HYPERLIPIDEMIA: Primary | ICD-10-CM

## 2025-04-21 LAB
ALBUMIN/CREAT UR: 8 MG/G CREAT
CHOLEST SERPL-MCNC: 278 MG/DL
CHOLEST/HDLC SERPL: 4.7 (CALC)
CREAT UR-MCNC: 243 MG/DL (ref 20–275)
HDLC SERPL-MCNC: 59 MG/DL
LDLC SERPL CALC-MCNC: 188 MG/DL (CALC)
MICROALBUMIN UR-MCNC: 1.9 MG/DL
NONHDLC SERPL-MCNC: 219 MG/DL (CALC)
TRIGL SERPL-MCNC: 163 MG/DL

## 2025-04-21 RX ORDER — ATORVASTATIN CALCIUM 20 MG/1
20 TABLET, FILM COATED ORAL DAILY
Qty: 30 TABLET | Refills: 11 | Status: SHIPPED | OUTPATIENT
Start: 2025-04-21 | End: 2026-04-21

## 2025-04-21 NOTE — PROGRESS NOTES
"Noted cholesterol panel results with elevated LDL, TC, triglycerides    Lab Results   Component Value Date    CHOL 278 (H) 04/18/2025    CHOL 155 10/01/2024     Lab Results   Component Value Date    HDL 59 04/18/2025    HDL 50.1 10/01/2024     Lab Results   Component Value Date    LDLCALC 188 (H) 04/18/2025    LDLCALC 83 10/01/2024     Lab Results   Component Value Date    TRIG 163 (H) 04/18/2025    TRIG 109 10/01/2024     No components found for: \"CHOLHDL\"    ASCVD 10-Year Risk Score  Current as of 2 days ago    3.5% 0 to < 5%: Low Risk   5 to < 7.5%: Borderline Risk   7.5 to < 20%: Intermediate Risk   20 to 100%: High Risk        Has DM and HTN, would benefit from statin  Patient has voiced she does not desire statin treatment to me in the past. Will reach out to her regarding results and use of stain based on these results  "

## 2025-04-30 ENCOUNTER — APPOINTMENT (OUTPATIENT)
Dept: NUTRITION | Facility: CLINIC | Age: 50
End: 2025-04-30
Payer: COMMERCIAL

## 2025-05-08 ENCOUNTER — APPOINTMENT (OUTPATIENT)
Dept: ENDOCRINOLOGY | Facility: CLINIC | Age: 50
End: 2025-05-08
Payer: COMMERCIAL

## 2025-05-13 ENCOUNTER — HOSPITAL ENCOUNTER (EMERGENCY)
Facility: HOSPITAL | Age: 50
Discharge: HOME | End: 2025-05-14
Attending: EMERGENCY MEDICINE
Payer: COMMERCIAL

## 2025-05-13 ENCOUNTER — APPOINTMENT (OUTPATIENT)
Dept: CARDIOLOGY | Facility: HOSPITAL | Age: 50
End: 2025-05-13
Payer: COMMERCIAL

## 2025-05-13 ENCOUNTER — APPOINTMENT (OUTPATIENT)
Dept: RADIOLOGY | Facility: HOSPITAL | Age: 50
End: 2025-05-13
Payer: COMMERCIAL

## 2025-05-13 ENCOUNTER — OFFICE VISIT (OUTPATIENT)
Dept: URGENT CARE | Age: 50
End: 2025-05-13
Payer: COMMERCIAL

## 2025-05-13 VITALS
HEART RATE: 93 BPM | RESPIRATION RATE: 18 BRPM | BODY MASS INDEX: 40.63 KG/M2 | WEIGHT: 238 LBS | TEMPERATURE: 98.5 F | SYSTOLIC BLOOD PRESSURE: 108 MMHG | HEIGHT: 64 IN | OXYGEN SATURATION: 97 % | DIASTOLIC BLOOD PRESSURE: 75 MMHG

## 2025-05-13 DIAGNOSIS — M62.838 MUSCLE SPASM: ICD-10-CM

## 2025-05-13 DIAGNOSIS — R10.84 GENERALIZED ABDOMINAL PAIN: Primary | ICD-10-CM

## 2025-05-13 DIAGNOSIS — R10.12 LEFT UPPER QUADRANT ABDOMINAL PAIN: ICD-10-CM

## 2025-05-13 LAB
ALBUMIN SERPL BCP-MCNC: 4.5 G/DL (ref 3.4–5)
ALP SERPL-CCNC: 74 U/L (ref 33–110)
ALT SERPL W P-5'-P-CCNC: 16 U/L (ref 7–45)
ANION GAP SERPL CALC-SCNC: 16 MMOL/L (ref 10–20)
AST SERPL W P-5'-P-CCNC: 14 U/L (ref 9–39)
BASOPHILS # BLD AUTO: 0.03 X10*3/UL (ref 0–0.1)
BASOPHILS NFR BLD AUTO: 0.3 %
BILIRUB SERPL-MCNC: 0.4 MG/DL (ref 0–1.2)
BUN SERPL-MCNC: 13 MG/DL (ref 6–23)
CALCIUM SERPL-MCNC: 9.1 MG/DL (ref 8.6–10.3)
CARDIAC TROPONIN I PNL SERPL HS: <3 NG/L (ref 0–13)
CHLORIDE SERPL-SCNC: 101 MMOL/L (ref 98–107)
CO2 SERPL-SCNC: 24 MMOL/L (ref 21–32)
CREAT SERPL-MCNC: 1.02 MG/DL (ref 0.5–1.05)
EGFRCR SERPLBLD CKD-EPI 2021: 68 ML/MIN/1.73M*2
EOSINOPHIL # BLD AUTO: 0.34 X10*3/UL (ref 0–0.7)
EOSINOPHIL NFR BLD AUTO: 3.1 %
ERYTHROCYTE [DISTWIDTH] IN BLOOD BY AUTOMATED COUNT: 12.9 % (ref 11.5–14.5)
GLUCOSE SERPL-MCNC: 133 MG/DL (ref 74–99)
HCT VFR BLD AUTO: 37.4 % (ref 36–46)
HGB BLD-MCNC: 13 G/DL (ref 12–16)
IMM GRANULOCYTES # BLD AUTO: 0.05 X10*3/UL (ref 0–0.7)
IMM GRANULOCYTES NFR BLD AUTO: 0.5 % (ref 0–0.9)
LIPASE SERPL-CCNC: 128 U/L (ref 9–82)
LYMPHOCYTES # BLD AUTO: 3.72 X10*3/UL (ref 1.2–4.8)
LYMPHOCYTES NFR BLD AUTO: 34 %
MCH RBC QN AUTO: 30.4 PG (ref 26–34)
MCHC RBC AUTO-ENTMCNC: 34.8 G/DL (ref 32–36)
MCV RBC AUTO: 88 FL (ref 80–100)
MONOCYTES # BLD AUTO: 0.5 X10*3/UL (ref 0.1–1)
MONOCYTES NFR BLD AUTO: 4.6 %
NEUTROPHILS # BLD AUTO: 6.31 X10*3/UL (ref 1.2–7.7)
NEUTROPHILS NFR BLD AUTO: 57.5 %
NRBC BLD-RTO: 0 /100 WBCS (ref 0–0)
PLATELET # BLD AUTO: 299 X10*3/UL (ref 150–450)
POC APPEARANCE, URINE: CLEAR
POC BILIRUBIN, URINE: NEGATIVE
POC BLOOD, URINE: ABNORMAL
POC COLOR, URINE: YELLOW
POC GLUCOSE, URINE: NEGATIVE MG/DL
POC KETONES, URINE: NEGATIVE MG/DL
POC LEUKOCYTES, URINE: NEGATIVE
POC NITRITE,URINE: NEGATIVE
POC PH, URINE: 6.5 PH
POC PROTEIN, URINE: NEGATIVE MG/DL
POC SPECIFIC GRAVITY, URINE: 1.01
POTASSIUM SERPL-SCNC: 3.2 MMOL/L (ref 3.5–5.3)
PREGNANCY TEST URINE, POC: NEGATIVE
PROT SERPL-MCNC: 7.1 G/DL (ref 6.4–8.2)
RBC # BLD AUTO: 4.27 X10*6/UL (ref 4–5.2)
SODIUM SERPL-SCNC: 138 MMOL/L (ref 136–145)
WBC # BLD AUTO: 11 X10*3/UL (ref 4.4–11.3)

## 2025-05-13 PROCEDURE — 96375 TX/PRO/DX INJ NEW DRUG ADDON: CPT

## 2025-05-13 PROCEDURE — 36415 COLL VENOUS BLD VENIPUNCTURE: CPT | Performed by: EMERGENCY MEDICINE

## 2025-05-13 PROCEDURE — 74176 CT ABD & PELVIS W/O CONTRAST: CPT

## 2025-05-13 PROCEDURE — 96374 THER/PROPH/DIAG INJ IV PUSH: CPT

## 2025-05-13 PROCEDURE — 71045 X-RAY EXAM CHEST 1 VIEW: CPT | Performed by: RADIOLOGY

## 2025-05-13 PROCEDURE — 84484 ASSAY OF TROPONIN QUANT: CPT | Performed by: EMERGENCY MEDICINE

## 2025-05-13 PROCEDURE — 96361 HYDRATE IV INFUSION ADD-ON: CPT

## 2025-05-13 PROCEDURE — 71045 X-RAY EXAM CHEST 1 VIEW: CPT

## 2025-05-13 PROCEDURE — 93005 ELECTROCARDIOGRAM TRACING: CPT

## 2025-05-13 PROCEDURE — 2500000004 HC RX 250 GENERAL PHARMACY W/ HCPCS (ALT 636 FOR OP/ED): Mod: JZ | Performed by: EMERGENCY MEDICINE

## 2025-05-13 PROCEDURE — 74176 CT ABD & PELVIS W/O CONTRAST: CPT | Performed by: RADIOLOGY

## 2025-05-13 PROCEDURE — 85025 COMPLETE CBC W/AUTO DIFF WBC: CPT | Performed by: EMERGENCY MEDICINE

## 2025-05-13 PROCEDURE — 83690 ASSAY OF LIPASE: CPT | Performed by: EMERGENCY MEDICINE

## 2025-05-13 PROCEDURE — 80053 COMPREHEN METABOLIC PANEL: CPT | Performed by: EMERGENCY MEDICINE

## 2025-05-13 PROCEDURE — 99285 EMERGENCY DEPT VISIT HI MDM: CPT | Mod: 25 | Performed by: EMERGENCY MEDICINE

## 2025-05-13 RX ORDER — KETOROLAC TROMETHAMINE 30 MG/ML
30 INJECTION, SOLUTION INTRAMUSCULAR; INTRAVENOUS ONCE
Status: COMPLETED | OUTPATIENT
Start: 2025-05-13 | End: 2025-05-13

## 2025-05-13 RX ORDER — ONDANSETRON HYDROCHLORIDE 2 MG/ML
4 INJECTION, SOLUTION INTRAVENOUS ONCE
Status: COMPLETED | OUTPATIENT
Start: 2025-05-13 | End: 2025-05-13

## 2025-05-13 RX ORDER — MORPHINE SULFATE 4 MG/ML
4 INJECTION, SOLUTION INTRAMUSCULAR; INTRAVENOUS ONCE
Status: COMPLETED | OUTPATIENT
Start: 2025-05-13 | End: 2025-05-13

## 2025-05-13 RX ORDER — POTASSIUM CHLORIDE 20 MEQ/1
40 TABLET, EXTENDED RELEASE ORAL ONCE
Status: COMPLETED | OUTPATIENT
Start: 2025-05-13 | End: 2025-05-14

## 2025-05-13 RX ADMIN — KETOROLAC TROMETHAMINE 30 MG: 30 INJECTION, SOLUTION INTRAMUSCULAR; INTRAVENOUS at 18:29

## 2025-05-13 RX ADMIN — SODIUM CHLORIDE 1000 ML: 0.9 INJECTION, SOLUTION INTRAVENOUS at 22:19

## 2025-05-13 RX ADMIN — MORPHINE SULFATE 4 MG: 4 INJECTION, SOLUTION INTRAMUSCULAR; INTRAVENOUS at 22:18

## 2025-05-13 RX ADMIN — ONDANSETRON 4 MG: 2 INJECTION INTRAMUSCULAR; INTRAVENOUS at 22:19

## 2025-05-13 ASSESSMENT — PAIN SCALES - GENERAL
PAINLEVEL_OUTOF10: 10 - WORST POSSIBLE PAIN
PAINLEVEL_OUTOF10: 10 - WORST POSSIBLE PAIN
PAINLEVEL_OUTOF10: 9

## 2025-05-13 ASSESSMENT — PAIN DESCRIPTION - PROGRESSION
CLINICAL_PROGRESSION: NOT CHANGED
CLINICAL_PROGRESSION: NOT CHANGED

## 2025-05-13 ASSESSMENT — ENCOUNTER SYMPTOMS
VOMITING: 1
ABDOMINAL PAIN: 1
BACK PAIN: 1

## 2025-05-13 ASSESSMENT — COLUMBIA-SUICIDE SEVERITY RATING SCALE - C-SSRS
6. HAVE YOU EVER DONE ANYTHING, STARTED TO DO ANYTHING, OR PREPARED TO DO ANYTHING TO END YOUR LIFE?: NO
1. IN THE PAST MONTH, HAVE YOU WISHED YOU WERE DEAD OR WISHED YOU COULD GO TO SLEEP AND NOT WAKE UP?: NO
2. HAVE YOU ACTUALLY HAD ANY THOUGHTS OF KILLING YOURSELF?: NO

## 2025-05-13 ASSESSMENT — PAIN DESCRIPTION - LOCATION
LOCATION: ABDOMEN
LOCATION: ABDOMEN

## 2025-05-13 ASSESSMENT — PAIN DESCRIPTION - ORIENTATION
ORIENTATION: MID
ORIENTATION: MID;UPPER

## 2025-05-13 ASSESSMENT — PAIN DESCRIPTION - PAIN TYPE
TYPE: ACUTE PAIN
TYPE: ACUTE PAIN

## 2025-05-13 ASSESSMENT — PAIN DESCRIPTION - FREQUENCY: FREQUENCY: CONSTANT/CONTINUOUS

## 2025-05-13 ASSESSMENT — PAIN - FUNCTIONAL ASSESSMENT
PAIN_FUNCTIONAL_ASSESSMENT: 0-10

## 2025-05-13 ASSESSMENT — PATIENT HEALTH QUESTIONNAIRE - PHQ9
2. FEELING DOWN, DEPRESSED OR HOPELESS: NOT AT ALL
SUM OF ALL RESPONSES TO PHQ9 QUESTIONS 1 AND 2: 0
1. LITTLE INTEREST OR PLEASURE IN DOING THINGS: NOT AT ALL

## 2025-05-13 ASSESSMENT — PAIN DESCRIPTION - ONSET: ONSET: AWAKENED FROM SLEEP

## 2025-05-13 ASSESSMENT — PAIN DESCRIPTION - DESCRIPTORS: DESCRIPTORS: ACHING;SHARP;STABBING

## 2025-05-13 NOTE — PROGRESS NOTES
"Subjective   Patient ID: Tor Patino is a 49 y.o. female. They present today with a chief complaint of Abdominal Pain, Back Pain, and Vomiting (Pt c/o left sided abdominal pain, left sided back pain, nausea, and vomiting x 1 day).    History of Present Illness  Pt is a diabetic with history of pancreatitis whopresents wanting labd work for increased abdominal pain rad to back anausea and concern over pancreatitis      Abdominal Pain  Associated symptoms include vomiting.   Back Pain  Associated symptoms include abdominal pain.   Vomiting  Associated symptoms: abdominal pain        Past Medical History  Allergies as of 05/13/2025 - Reviewed 05/13/2025   Allergen Reaction Noted    Bee pollen Unknown 11/20/2015       Prescriptions Prior to Admission[1]     Medical History[2]    Surgical History[3]     reports that she quit smoking about 7 years ago. Her smoking use included cigarettes. She has a 22.5 pack-year smoking history. She has never used smokeless tobacco. She reports that she does not currently use alcohol. She reports that she does not currently use drugs after having used the following drugs: \"Crack\" cocaine and Heroin.    Review of Systems  Review of Systems   Gastrointestinal:  Positive for abdominal pain and vomiting.   Musculoskeletal:  Positive for back pain.                                  Objective    Vitals:    05/13/25 1730   BP: 108/75   Pulse: 93   Resp: 18   Temp: 36.9 °C (98.5 °F)   SpO2: 97%   Weight: 108 kg (238 lb)   Height: 1.626 m (5' 4\")     No LMP recorded. Patient has had a hysterectomy.    Physical Exam  Constitutional:       Appearance: She is ill-appearing.   Cardiovascular:      Rate and Rhythm: Normal rate and regular rhythm.   Pulmonary:      Effort: Pulmonary effort is normal.      Breath sounds: Normal breath sounds.   Abdominal:      Comments: Diffusely tender         Procedures    Point of Care Test & Imaging Results from this visit  Results for orders placed or performed " in visit on 05/13/25   POCT UA Automated manually resulted   Result Value Ref Range    POC Color, Urine Yellow Straw, Yellow, Light-Yellow    POC Appearance, Urine Clear Clear    POC Glucose, Urine NEGATIVE NEGATIVE mg/dl    POC Bilirubin, Urine NEGATIVE NEGATIVE    POC Ketones, Urine NEGATIVE NEGATIVE mg/dl    POC Specific Gravity, Urine 1.010 1.005 - 1.035    POC Blood, Urine TRACE-Intact (A) NEGATIVE    POC PH, Urine 6.5 No Reference Range Established PH    POC Protein, Urine NEGATIVE NEGATIVE mg/dl    Poc Nitrite, Urine NEGATIVE NEGATIVE    POC Leukocytes, Urine NEGATIVE NEGATIVE   POCT pregnancy, urine manually resulted   Result Value Ref Range    Preg Test, Ur Negative Negative      Imaging  No results found.    Cardiology, Vascular, and Other Imaging  No other imaging results found for the past 2 days      Diagnostic study results (if any) were reviewed by Esthela Nguyen MD.    Assessment/Plan   Allergies, medications, history, and pertinent labs/EKGs/Imaging reviewed by Esthela Nguyen MD.     Medical Decision Making  Pt does not want to go to the ed she clinically has pancreatitis and needs to follow up pcp  Orders and Diagnoses  Diagnoses and all orders for this visit:  Left upper quadrant abdominal pain  -     POCT UA Automated manually resulted  -     POCT pregnancy, urine manually resulted  -     ketorolac (Toradol) injection 30 mg  -     Comprehensive Metabolic Panel  -     Amylase  -     Lipase  -     CBC and Auto Differential      Medical Admin Record  Administrations This Visit       ketorolac (Toradol) injection 30 mg       Admin Date  05/13/2025 Action  Given Dose  30 mg Route  intravenous Documented By  Naz Swenson MA                    Patient disposition: Home    Electronically signed by Esthela Nguyen MD  7:46 PM           [1] (Not in a hospital admission)  [2]   Past Medical History:  Diagnosis Date    Abnormal Pap smear of cervix 1990    CTS (carpal tunnel syndrome) 2016     Diverticulosis Unsure    Endometriosis     Fibroid 2017    GERD (gastroesophageal reflux disease)     Hyperlipidemia 01/13/1998    Hypertension     Irritable bowel syndrome Unsure    Kidney stone 2017    Liver disease     Pancreatitis ([facility]) 1/7/2025    Pulmonary embolism    [3]   Past Surgical History:  Procedure Laterality Date    APPENDECTOMY      CHOLECYSTECTOMY      COLPOSCOPY  01/28/2025    HYSTERECTOMY  02/2019    LAPAROSCOPIC HYSTERECTOMY      TOTAL KNEE ARTHROPLASTY

## 2025-05-14 VITALS
BODY MASS INDEX: 40.63 KG/M2 | HEIGHT: 64 IN | OXYGEN SATURATION: 99 % | HEART RATE: 76 BPM | DIASTOLIC BLOOD PRESSURE: 56 MMHG | WEIGHT: 238 LBS | RESPIRATION RATE: 16 BRPM | TEMPERATURE: 97.5 F | SYSTOLIC BLOOD PRESSURE: 101 MMHG

## 2025-05-14 LAB
APPEARANCE UR: CLEAR
BILIRUB UR STRIP.AUTO-MCNC: NEGATIVE MG/DL
COLOR UR: ABNORMAL
GLUCOSE UR STRIP.AUTO-MCNC: NORMAL MG/DL
HOLD SPECIMEN: 293
KETONES UR STRIP.AUTO-MCNC: NEGATIVE MG/DL
LEUKOCYTE ESTERASE UR QL STRIP.AUTO: NEGATIVE
LIPASE SERPL-CCNC: 80 U/L (ref 9–82)
NITRITE UR QL STRIP.AUTO: NEGATIVE
PH UR STRIP.AUTO: 7 [PH]
PROT UR STRIP.AUTO-MCNC: NEGATIVE MG/DL
RBC # UR STRIP.AUTO: ABNORMAL MG/DL
RBC #/AREA URNS AUTO: ABNORMAL /HPF
SP GR UR STRIP.AUTO: 1.01
SQUAMOUS #/AREA URNS AUTO: ABNORMAL /HPF
UROBILINOGEN UR STRIP.AUTO-MCNC: NORMAL MG/DL
WBC #/AREA URNS AUTO: ABNORMAL /HPF

## 2025-05-14 PROCEDURE — 2500000002 HC RX 250 W HCPCS SELF ADMINISTERED DRUGS (ALT 637 FOR MEDICARE OP, ALT 636 FOR OP/ED): Performed by: EMERGENCY MEDICINE

## 2025-05-14 PROCEDURE — 96375 TX/PRO/DX INJ NEW DRUG ADDON: CPT

## 2025-05-14 PROCEDURE — 36415 COLL VENOUS BLD VENIPUNCTURE: CPT | Performed by: EMERGENCY MEDICINE

## 2025-05-14 PROCEDURE — 83690 ASSAY OF LIPASE: CPT | Performed by: EMERGENCY MEDICINE

## 2025-05-14 PROCEDURE — 81001 URINALYSIS AUTO W/SCOPE: CPT | Performed by: EMERGENCY MEDICINE

## 2025-05-14 PROCEDURE — 2500000004 HC RX 250 GENERAL PHARMACY W/ HCPCS (ALT 636 FOR OP/ED): Mod: JZ | Performed by: EMERGENCY MEDICINE

## 2025-05-14 RX ORDER — METHYLPREDNISOLONE 4 MG/1
TABLET ORAL
Qty: 21 TABLET | Refills: 0 | Status: SHIPPED | OUTPATIENT
Start: 2025-05-14 | End: 2025-05-20

## 2025-05-14 RX ORDER — HYDROMORPHONE HYDROCHLORIDE 1 MG/ML
1 INJECTION, SOLUTION INTRAMUSCULAR; INTRAVENOUS; SUBCUTANEOUS ONCE
Status: COMPLETED | OUTPATIENT
Start: 2025-05-14 | End: 2025-05-14

## 2025-05-14 RX ORDER — CYCLOBENZAPRINE HCL 10 MG
10 TABLET ORAL 2 TIMES DAILY PRN
Qty: 20 TABLET | Refills: 0 | Status: SHIPPED | OUTPATIENT
Start: 2025-05-14 | End: 2025-05-24

## 2025-05-14 RX ADMIN — POTASSIUM CHLORIDE 40 MEQ: 1500 TABLET, EXTENDED RELEASE ORAL at 02:23

## 2025-05-14 RX ADMIN — HYDROMORPHONE HYDROCHLORIDE 1 MG: 1 INJECTION, SOLUTION INTRAMUSCULAR; INTRAVENOUS; SUBCUTANEOUS at 00:38

## 2025-05-14 NOTE — DISCHARGE INSTRUCTIONS
Please follow-up care provider in 2 to 3 days.  Return to the emergency department if develop any worsening abdominal pain, nausea, vomiting, fever, chills or if any concerns arise.  Please take Flexeril and Medrol Dosepak for your back pain.

## 2025-05-14 NOTE — ED PROVIDER NOTES
HPI   Chief Complaint   Patient presents with    Abdominal Pain     Comes from home for epigastric pain that started this morning. She went to  and was given toradol but endorsing no relief. She's saying the pain is almost like a kidney stone, but not. Patient endorsing N/V/D.       This is a 49 years old female patient presented to the emergency department with a chief complaint of epigastric pain radiating to the back as well as to the left shoulder.  Stated that started in the morning and she went to an urgent care clinic and received Toradol which did not help the pain much.  Denies any nausea, vomiting, headaches, lightheadedness, dizziness, chest pain, shortness of breath, cough, weakness, numbness, urinary symptoms, diarrhea or constipation.  Stated that she had hysterectomy, appendectomy, cholecystectomy.    Review of system: As above in the HPI section              Patient History   Medical History[1]  Surgical History[2]  Family History[3]  Social History[4]    Physical Exam   ED Triage Vitals [05/13/25 2023]   Temperature Heart Rate Respirations BP   36.4 °C (97.5 °F) (!) 103 20 122/75      Pulse Ox Temp src Heart Rate Source Patient Position   100 % -- Monitor Sitting      BP Location FiO2 (%)     Right arm --       Physical Exam  Constitutional:       General: She is not in acute distress.     Appearance: She is well-developed. She is not ill-appearing.   HENT:      Head: Normocephalic and atraumatic.   Pulmonary:      Effort: Pulmonary effort is normal. No respiratory distress.      Breath sounds: Normal breath sounds. No wheezing, rhonchi or rales.   Abdominal:      General: There is distension.      Tenderness: There is abdominal tenderness in the epigastric area.   Skin:     General: Skin is warm and dry.      Capillary Refill: Capillary refill takes less than 2 seconds.   Neurological:      General: No focal deficit present.      Mental Status: She is alert and oriented to person, place, and  time.   Psychiatric:         Mood and Affect: Mood normal.           ED Course & MDM   Diagnoses as of 05/14/25 0351   Generalized abdominal pain   Muscle spasm                 No data recorded     Shena Coma Scale Score: 15 (05/13/25 2025 : Breanna Coleman RN)                           Medical Decision Making  Patient is seen and examined, obtain basic labs, lipase, routine cardiac workup given that the epigastric pain is radiating to the back into the left shoulder.  Will obtain CT abdomen pelvis as well as chest x-ray.  Will obtain EKG.    EKG revealed normal sinus rhythm, rate 96 bpm, normal FL, QRS, normal QTc duration.  No ST segment or T wave pathology.  Good R wave progression throughout the precordial leads.    Workup was unremarkable.  Urine revealed no urinary tract infection.  There is very mild hematuria.  There is no signs of urinary tract infection.  Lipase is trending down.  Patient reports improvement in her symptoms and feeling very comfortable to be discharged home.  Patient is discharged home to follow-up with a primary care provider with instruction to return to the ED if alarming symptoms arise.  Patient is discharged on Medrol Dosepak and Flexeril for concern of back pain/muscle spasm        Procedure  Procedures         [1]   Past Medical History:  Diagnosis Date    Abnormal Pap smear of cervix 1990    CTS (carpal tunnel syndrome) 2016    Diverticulosis Unsure    Endometriosis     Fibroid 2017    GERD (gastroesophageal reflux disease)     Hyperlipidemia 01/13/1998    Hypertension     Irritable bowel syndrome Unsure    Kidney stone 2017    Liver disease     Pancreatitis ([facility]) 1/7/2025    Pulmonary embolism    [2]   Past Surgical History:  Procedure Laterality Date    APPENDECTOMY      CHOLECYSTECTOMY      COLPOSCOPY  01/28/2025    HYSTERECTOMY  02/2019    LAPAROSCOPIC HYSTERECTOMY      TOTAL KNEE ARTHROPLASTY     [3]   Family History  Problem Relation Name Age of Onset    Breast  "cancer Maternal Grandmother Grandmother     Colon cancer Neg Hx      Ovarian cancer Neg Hx      Uterine cancer Neg Hx     [4]   Social History  Tobacco Use    Smoking status: Former     Current packs/day: 0.00     Average packs/day: 1.5 packs/day for 15.0 years (22.5 ttl pk-yrs)     Types: Cigarettes     Quit date: 2017     Years since quittin.7    Smokeless tobacco: Never    Tobacco comments:     I quit   Vaping Use    Vaping status: Never Used   Substance Use Topics    Alcohol use: Not Currently     Comment: I rarely drank    Drug use: Not Currently     Types: \"Crack\" cocaine, Heroin     Comment: 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

## 2025-05-14 NOTE — ED TRIAGE NOTES
Comes from home for epigastric pain that started this morning. She went to  and was given toradol but endorsing no relief. She's saying the pain is almost like a kidney stone, but not. Patient endorsing N/V/D.

## 2025-05-15 ENCOUNTER — APPOINTMENT (OUTPATIENT)
Dept: ENDOCRINOLOGY | Facility: CLINIC | Age: 50
End: 2025-05-15
Payer: COMMERCIAL

## 2025-05-15 VITALS
SYSTOLIC BLOOD PRESSURE: 119 MMHG | DIASTOLIC BLOOD PRESSURE: 81 MMHG | WEIGHT: 248.2 LBS | BODY MASS INDEX: 42.37 KG/M2 | HEIGHT: 64 IN | HEART RATE: 88 BPM | TEMPERATURE: 97.5 F

## 2025-05-15 DIAGNOSIS — E66.813 CLASS 3 SEVERE OBESITY WITHOUT SERIOUS COMORBIDITY WITH BODY MASS INDEX (BMI) OF 40.0 TO 44.9 IN ADULT: Primary | ICD-10-CM

## 2025-05-15 DIAGNOSIS — E11.65 CONTROLLED TYPE 2 DIABETES MELLITUS WITH HYPERGLYCEMIA, WITHOUT LONG-TERM CURRENT USE OF INSULIN: ICD-10-CM

## 2025-05-15 LAB
ATRIAL RATE: 96 BPM
P AXIS: 43 DEGREES
PR INTERVAL: 163 MS
Q ONSET: 251 MS
QRS COUNT: 15 BEATS
QRS DURATION: 86 MS
QT INTERVAL: 355 MS
QTC CALCULATION(BAZETT): 449 MS
QTC FREDERICIA: 415 MS
R AXIS: 11 DEGREES
T AXIS: 38 DEGREES
T OFFSET: 428 MS
VENTRICULAR RATE: 96 BPM

## 2025-05-15 PROCEDURE — 99215 OFFICE O/P EST HI 40 MIN: CPT | Performed by: NURSE PRACTITIONER

## 2025-05-15 PROCEDURE — 3008F BODY MASS INDEX DOCD: CPT | Performed by: NURSE PRACTITIONER

## 2025-05-15 PROCEDURE — 3074F SYST BP LT 130 MM HG: CPT | Performed by: NURSE PRACTITIONER

## 2025-05-15 PROCEDURE — 3079F DIAST BP 80-89 MM HG: CPT | Performed by: NURSE PRACTITIONER

## 2025-05-15 PROCEDURE — 3044F HG A1C LEVEL LT 7.0%: CPT | Performed by: NURSE PRACTITIONER

## 2025-05-15 RX ORDER — SEMAGLUTIDE 2.68 MG/ML
2 INJECTION, SOLUTION SUBCUTANEOUS
Qty: 3 ML | Refills: 3 | Status: SHIPPED | OUTPATIENT
Start: 2025-05-15

## 2025-05-15 NOTE — PATIENT INSTRUCTIONS
New Goals:   Nutrition- Avoid skipping meals, Healthy Plate ,   Exercise- Walking 3 times per week and add in resistance/strength exercise sessions 2-3 times per week ,   Medication- Increase to Ozempic 2.0 mg to address further weight loss  , and Follow-up- 1 month

## 2025-05-15 NOTE — PROGRESS NOTES
"Subjective  Tor Patino is a 49 y.o. female with a hx of obesity  who presents for weight management and obesity medicine follow up.      Current Plan  1. Nutrition: Mediterranean Diet and Healthy Plate  Doing better with having versus skipping meal  Met with TINY Weber and since this time has changed her AM intake to high fiber/protein versus \"sweets\"     2. Sleep: Stable      3. Stress: Stable     4. Exercise: Incorporating inconsistently-        5. Appetite control: Decreased  Obesity medication: Ozempic- 1 mg/dose (4 mg/3 mL)   - denies side effects  -still with cravings although attempts to not succumb to them     6. Prior Goals: Met  Nutrition- Healthy Plate and focus on 45 grams pf carbs with meals,   Medication- start the Ozempic 1 mg,   and Other- labs, have first group visit      New Goals: Nutrition- Avoid skipping meals, Healthy Plate , Exercise- Walking 3 times per week and add in resistance/strength exercise sessions 2-3 times per week , Medication- Increase to Ozempic 2.0 mg to address further weight loss  , and Follow-up- 1 month     Weight trend:    Wt Readings from Last 3 Encounters:   05/15/25 113 kg (248 lb 3.2 oz)   05/13/25 108 kg (238 lb)   05/13/25 108 kg (238 lb)      Most Recent 5/16/24 - 5/15/25 02/20/25 04/08/25 09:07 04/09/25 15:32   Weight 113 kg (248 lb 3.2 oz)  5/15/25 15:12 113 kg (248 lb 12.8 oz) 112 kg (247 lb 8 oz) 110 kg (243 lb)       Lab Results   Component Value Date    HGBA1C 6.9 (A) 02/20/2025       Component  Ref Range & Units 1 d ago  (5/14/25) 2 d ago  (5/13/25)    8 mo ago  (9/8/24) 8 mo ago  (8/26/24)   Lipase  9 - 82 U/L 80 128 High           4 mo ago  (1/7/25) 6 mo ago  (11/15/24) 7 mo ago  (10/7/24) 8 mo ago  (9/8/24) 8 mo ago  (8/26/24)      Lipase  9 - 82 U/L 80 128 High  875 High  82 81 47 41          FIB-4 Calculation: 0.56 at 5/13/2025  9:34 PM  Calculated from:  SGOT/AST: 14 U/L at 5/13/2025  9:34 PM  SGPT/ALT: 16 U/L at 5/13/2025  9:34 PM  Platelets: " "299 x10*3/uL at 2025  9:34 PM  Age: 49 years    Recent weight:    Current Medications[1]    ROS:  System: normal  Eyes : no visual changes  Neck : no tenderness, no new lumps/bumps  Respiratory : no SOB  Cardiovascular : no chest pain, no palpitations  Gastro-Intestinal : no abdominal concerns  Neurological : no numbness or tingling in the extremities  Musculoskeletal : no joint paint, no muscle pain  Skin : no unusual rashes  Psychiatric : no depression, no anxiety  See HPI for Endocrine ROS    Medical History[2]    Surgical History[3]    Social History     Socioeconomic History    Marital status: Single     Spouse name: Not on file    Number of children: Not on file    Years of education: Not on file    Highest education level: Not on file   Occupational History    Not on file   Tobacco Use    Smoking status: Former     Current packs/day: 0.00     Average packs/day: 1.5 packs/day for 15.0 years (22.5 ttl pk-yrs)     Types: Cigarettes     Quit date: 2017     Years since quittin.7    Smokeless tobacco: Never    Tobacco comments:     I quit   Vaping Use    Vaping status: Never Used   Substance and Sexual Activity    Alcohol use: Not Currently     Comment: I rarely drank    Drug use: Not Currently     Types: \"Crack\" cocaine, Heroin     Comment: 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    Sexual activity: Not Currently     Partners: Male     Birth control/protection: Abstinence     Comment: I’m practicing celibacy   Other Topics Concern    Not on file   Social History Narrative    Not on file     Social Drivers of Health     Financial Resource Strain: Low Risk  (2025)    Overall Financial Resource Strain (CARDIA)     Difficulty of Paying Living Expenses: Not hard at all   Food Insecurity: No Food Insecurity (2025)    Hunger Vital Sign     Worried About Running Out of Food in the Last Year: Never true     Ran Out of Food in the Last Year: Never true   Transportation Needs: No Transportation Needs " "(1/9/2025)    PRAPARE - Transportation     Lack of Transportation (Medical): No     Lack of Transportation (Non-Medical): No   Physical Activity: Not on File (9/26/2019)    Received from [facility]    Physical Activity     Physical Activity: 0   Stress: Not on File (9/26/2019)    Received from [facility]    Stress     Stress: 0   Social Connections: Not on File (9/26/2019)    Received from [facility]    Social Connections     Social Connections and Isolation: 0   Intimate Partner Violence: Not At Risk (1/8/2025)    Humiliation, Afraid, Rape, and Kick questionnaire     Fear of Current or Ex-Partner: No     Emotionally Abused: No     Physically Abused: No     Sexually Abused: No   Housing Stability: Low Risk  (1/9/2025)    Housing Stability Vital Sign     Unable to Pay for Housing in the Last Year: No     Number of Times Moved in the Last Year: 0     Homeless in the Last Year: No       Objective      Physical Exam:  Blood pressure 119/81, pulse 88, temperature 36.4 °C (97.5 °F), temperature source Temporal, height 1.626 m (5' 4\"), weight 113 kg (248 lb 3.2 oz).  General : alert and oriented X3, no acute distress  Eyes : EOMI     Assessment/Plan   Tor Patino is a 49 y.o. female with a hx of obeisty and diabetes 2 who presents for follow up for weight management and obesity medicine visit.    A/P Follow up:  Stable weight  Improved Lpase values as noted above    Problem List Items Addressed This Visit       Controlled type 2 diabetes mellitus with hyperglycemia, without long-term current use of insulin         New Goals:   Nutrition- Avoid skipping meals, Healthy Plate ,   Exercise- Walking 3 times per week and add in resistance/strength exercise sessions 2-3 times per week ,   Medication- Increase to Ozempic 2.0 mg to address further weight loss  , and Follow-up- 1 month   [facility] Topic: Importance of Exercise    Dietitian Present during [facility]: Montserrat Weber RD, CSOWM, LD, CDCES    Weight Management : Reviewed the principles of " energy metabolism, caloric intake and expenditure, and rationale for a treatment program.  Also reinforced need for reduced calorie, low fat diet and increased physical activity.    Follow up in a group or individual visit as determined.    Emilia Dominguez, APRN-CNP         [1]   Current Outpatient Medications:     atorvastatin (Lipitor) 20 mg tablet, Take 1 tablet (20 mg) by mouth once daily., Disp: 30 tablet, Rfl: 11    blood-glucose sensor (FreeStyle Christi 3 Plus Sensor) device, Change every 15 days, Disp: 6 each, Rfl: 3    cyclobenzaprine (Flexeril) 10 mg tablet, Take 1 tablet (10 mg) by mouth 2 times a day as needed for muscle spasms for up to 10 days., Disp: 20 tablet, Rfl: 0    cyclobenzaprine (Flexeril) 5 mg tablet, Take 1 tablet (5 mg) by mouth as needed at bedtime for muscle spasms., Disp: 30 tablet, Rfl: 0    estradiol (Estrace) 0.5 mg tablet, Take 1 tablet (0.5 mg) by mouth once daily., Disp: , Rfl:     fezolinetant 45 mg tablet, Take 45 mg by mouth once daily., Disp: 30 tablet, Rfl: 2    hydroCHLOROthiazide (HYDRODiuril) 25 mg tablet, Take 1 tablet (25 mg) by mouth once daily., Disp: 90 tablet, Rfl: 3    metFORMIN (Glucophage) 500 mg tablet, TAKE 1 TABLET(500 MG) BY MOUTH TWICE DAILY IN THE MORNING AND LATE AFTERNOON, Disp: 60 tablet, Rfl: 5    methylPREDNISolone (Medrol Dospak) 4 mg tablets, Follow schedule on package instructions, Disp: 21 tablet, Rfl: 0    omeprazole (PriLOSEC) 40 mg DR capsule, Take 1 capsule (40 mg) by mouth once daily in the morning. Take before meals., Disp: 90 capsule, Rfl: 3    semaglutide (Ozempic) 1 mg/dose (4 mg/3 mL) pen injector, Inject 1 mg under the skin 1 (one) time per week., Disp: 3 mL, Rfl: 1  [2]   Past Medical History:  Diagnosis Date    Abnormal Pap smear of cervix 1990    CTS (carpal tunnel syndrome) 2016    Diverticulosis Unsure    Endometriosis     Fibroid 2017    GERD (gastroesophageal reflux disease)     Hyperlipidemia 01/13/1998    Hypertension     Irritable  bowel syndrome Unsure    Kidney stone 2017    Liver disease     Pancreatitis ([facility]) 1/7/2025    Pulmonary embolism    [3]   Past Surgical History:  Procedure Laterality Date    APPENDECTOMY      CHOLECYSTECTOMY      COLPOSCOPY  01/28/2025    HYSTERECTOMY  02/2019    LAPAROSCOPIC HYSTERECTOMY      TOTAL KNEE ARTHROPLASTY

## 2025-05-20 DIAGNOSIS — E11.65 CONTROLLED TYPE 2 DIABETES MELLITUS WITH HYPERGLYCEMIA, WITHOUT LONG-TERM CURRENT USE OF INSULIN: ICD-10-CM

## 2025-06-12 ENCOUNTER — APPOINTMENT (OUTPATIENT)
Dept: ENDOCRINOLOGY | Facility: CLINIC | Age: 50
End: 2025-06-12
Payer: COMMERCIAL

## 2025-06-12 VITALS
WEIGHT: 240.8 LBS | DIASTOLIC BLOOD PRESSURE: 85 MMHG | SYSTOLIC BLOOD PRESSURE: 124 MMHG | HEIGHT: 64 IN | TEMPERATURE: 97.5 F | BODY MASS INDEX: 41.11 KG/M2

## 2025-06-12 DIAGNOSIS — E78.2 MIXED HYPERLIPIDEMIA: ICD-10-CM

## 2025-06-12 DIAGNOSIS — Z00.00 HEALTH MAINTENANCE EXAMINATION: ICD-10-CM

## 2025-06-12 DIAGNOSIS — E66.813 CLASS 3 SEVERE OBESITY WITHOUT SERIOUS COMORBIDITY WITH BODY MASS INDEX (BMI) OF 40.0 TO 44.9 IN ADULT, UNSPECIFIED OBESITY TYPE: ICD-10-CM

## 2025-06-12 DIAGNOSIS — E11.9 TYPE 2 DIABETES MELLITUS WITHOUT COMPLICATION, WITHOUT LONG-TERM CURRENT USE OF INSULIN: Primary | ICD-10-CM

## 2025-06-12 LAB — POC HEMOGLOBIN A1C: 6.2 % (ref 4.2–6.5)

## 2025-06-12 PROCEDURE — 3008F BODY MASS INDEX DOCD: CPT | Performed by: NURSE PRACTITIONER

## 2025-06-12 PROCEDURE — 99215 OFFICE O/P EST HI 40 MIN: CPT | Performed by: NURSE PRACTITIONER

## 2025-06-12 PROCEDURE — 3074F SYST BP LT 130 MM HG: CPT | Performed by: NURSE PRACTITIONER

## 2025-06-12 PROCEDURE — 3044F HG A1C LEVEL LT 7.0%: CPT | Performed by: NURSE PRACTITIONER

## 2025-06-12 PROCEDURE — 3079F DIAST BP 80-89 MM HG: CPT | Performed by: NURSE PRACTITIONER

## 2025-06-12 PROCEDURE — 83036 HEMOGLOBIN GLYCOSYLATED A1C: CPT | Performed by: NURSE PRACTITIONER

## 2025-06-12 RX ORDER — IBUPROFEN 600 MG/1
600 TABLET, FILM COATED ORAL EVERY 8 HOURS PRN
COMMUNITY

## 2025-06-12 NOTE — PROGRESS NOTES
"Subjective  Tor Patino is a 50 y.o. female with a hx of obesity, diabetes and HLD who presents for weight management and obesity medicine follow up.  Updates: she reports she has some night hunger at time  Current Plan  1. Nutrition: Mediterranean Diet and Healthy Plate  Protein shake in AM is consistent    2. Sleep: Stable      3. Stress: Stable     4. Exercise: Incorporating inconsistently-     she reports \"I have been slacking with exercise over last 2 weeks with her accountability partner being away\"    5. Appetite control: Stable  Obesity medication: Ozempic-  2 mg     6. Prior Goals: Partially Met  Nutrition- Avoid skipping meals, Healthy Plate ,   Exercise- Walking 3 times per week and add in resistance/strength exercise sessions 2-3 times per week ,   Medication- Increase to Ozempic 2.0 mg to address further weight loss    and Follow-up- 1 month      New Goals: Nutrition- Continue Healthy Plate, avoid night snacking, Use protein shakes to avoid skipping meals, Exercise- Begin Walking 3 times per week and add in resistance/strength exercise sessions 2-3 times per week , Medication- Ozempic 2.0 mg, and Follow-up- 1 month    Weight trend:    Wt Readings from Last 3 Encounters:   06/12/25 109 kg (240 lb 12.8 oz)   05/15/25 113 kg (248 lb 3.2 oz)   05/13/25 108 kg (238 lb)      04/08/25 09:07   Weight 112 kg (247 lb 8 oz)      02/19/25   Weight 112 kg (246 lb)     Lab Results   Component Value Date    CHOL 278 (H) 04/18/2025    CHOL 155 10/01/2024     Lab Results   Component Value Date    HDL 59 04/18/2025    HDL 50.1 10/01/2024     Lab Results   Component Value Date    LDLCALC 188 (H) 04/18/2025    LDLCALC 83 10/01/2024     Lab Results   Component Value Date    TRIG 163 (H) 04/18/2025    TRIG 109 10/01/2024     FIB-4 Calculation: 0.56 at 5/13/2025  9:34 PM  Calculated from:  SGOT/AST: 14 U/L at 5/13/2025  9:34 PM  SGPT/ALT: 16 U/L at 5/13/2025  9:34 PM  Platelets: 299 x10*3/uL at 5/13/2025  9:34 PM  Age: 49 " "years    Lab Results   Component Value Date    HGBA1C 6.2 2025     Lab Results   Component Value Date    HGBA1C 6.2 2025      Previously higher values noted of 6.9 2025    Current Medications[1]    ROS:  System: normal  Eyes : no visual changes  Neck : no tenderness, no new lumps/bumps  Respiratory : no SOB  Cardiovascular : no chest pain, no palpitations  Gastro-Intestinal : no abdominal concerns  Neurological : no numbness or tingling in the extremities  Musculoskeletal : no joint paint, no muscle pain  Skin : no unusual rashes  Psychiatric : no depression, no anxiety  See HPI for Endocrine ROS    Medical History[2]    Surgical History[3]    Social History     Socioeconomic History    Marital status: Single     Spouse name: Not on file    Number of children: Not on file    Years of education: Not on file    Highest education level: Not on file   Occupational History    Not on file   Tobacco Use    Smoking status: Former     Current packs/day: 0.00     Average packs/day: 1.5 packs/day for 15.0 years (22.5 ttl pk-yrs)     Types: Cigarettes     Quit date: 2017     Years since quittin.8    Smokeless tobacco: Never    Tobacco comments:     I quit   Vaping Use    Vaping status: Never Used   Substance and Sexual Activity    Alcohol use: Not Currently     Comment: I rarely drank    Drug use: Not Currently     Types: \"Crack\" cocaine, Heroin     Comment: 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    Sexual activity: Not Currently     Partners: Male     Birth control/protection: Abstinence     Comment: I’m practicing celibacy   Other Topics Concern    Not on file   Social History Narrative    Not on file     Social Drivers of Health     Financial Resource Strain: Low Risk  (2025)    Overall Financial Resource Strain (CARDIA)     Difficulty of Paying Living Expenses: Not hard at all   Food Insecurity: No Food Insecurity (2025)    Hunger Vital Sign     Worried About Running Out of Food in the Last Year: " "Never true     Ran Out of Food in the Last Year: Never true   Transportation Needs: No Transportation Needs (1/9/2025)    PRAPARE - Transportation     Lack of Transportation (Medical): No     Lack of Transportation (Non-Medical): No   Physical Activity: Not on File (9/26/2019)    Received from [facility]    Physical Activity     Physical Activity: 0   Stress: Not on File (9/26/2019)    Received from [facility]    Stress     Stress: 0   Social Connections: Not on File (9/26/2019)    Received from [facility]    Social Connections     Social Connections and Isolation: 0   Intimate Partner Violence: Not At Risk (1/8/2025)    Humiliation, Afraid, Rape, and Kick questionnaire     Fear of Current or Ex-Partner: No     Emotionally Abused: No     Physically Abused: No     Sexually Abused: No   Housing Stability: Low Risk  (1/9/2025)    Housing Stability Vital Sign     Unable to Pay for Housing in the Last Year: No     Number of Times Moved in the Last Year: 0     Homeless in the Last Year: No       Objective      Physical Exam:  Blood pressure 124/85, temperature 36.4 °C (97.5 °F), temperature source Temporal, height 1.626 m (5' 4\"), weight 109 kg (240 lb 12.8 oz).  General : alert and oriented X3, no acute distress  Eyes : EOMI     Assessment/Plan   Tor Patino is a 50 y.o. female with a hx of obesity who presents for follow up for weight management and obesity medicine visit.    A/P Follow up:    Loss of 8 pounds since 5/15/25  Hemoglobin A1c is at goals at 6.2 and is improved    Problem List Items Addressed This Visit       Mixed hyperlipidemia    Type 2 diabetes mellitus without complication, without long-term current use of insulin - Primary     Other Visit Diagnoses         Class 3 severe obesity without serious comorbidity with body mass index (BMI) of 40.0 to 44.9 in adult, unspecified obesity type          Health maintenance examination        Relevant Orders    POCT glycosylated hemoglobin (Hb A1C) manually resulted " (Completed)            New Goals:   Nutrition- Continue Healthy Plate, avoid night snacking, Use protein shakes to avoid skipping meals,     Exercise- Begin Walking 3 times per week and add in resistance/strength exercise sessions 2-3 times per week    Medication- Ozempic 2.0 mg    Follow-up- 1 month      [facility] Topic: Healthy diet on a budget    Dietitian Present during [facility]: Allison Varma MS, RD, LD    Weight Management : Reviewed the principles of energy metabolism, caloric intake and expenditure, and rationale for a treatment program.  Also reinforced need for reduced calorie, low fat diet and increased physical activity.    Follow up in a group or individual visit as determined.    Emilia Dominguez, APRN-CNP         [1]   Current Outpatient Medications:     atorvastatin (Lipitor) 20 mg tablet, Take 1 tablet (20 mg) by mouth once daily., Disp: 30 tablet, Rfl: 11    blood-glucose sensor (FreeStyle Christi 3 Plus Sensor) device, Change every 15 days, Disp: 6 each, Rfl: 3    cyclobenzaprine (Flexeril) 5 mg tablet, Take 1 tablet (5 mg) by mouth as needed at bedtime for muscle spasms., Disp: 30 tablet, Rfl: 0    estradiol (Estrace) 0.5 mg tablet, Take 1 tablet (0.5 mg) by mouth once daily., Disp: , Rfl:     hydroCHLOROthiazide (HYDRODiuril) 25 mg tablet, Take 1 tablet (25 mg) by mouth once daily., Disp: 90 tablet, Rfl: 3    metFORMIN (Glucophage) 500 mg tablet, TAKE 1 TABLET(500 MG) BY MOUTH TWICE DAILY IN THE MORNING AND LATE AFTERNOON, Disp: 60 tablet, Rfl: 5    omeprazole (PriLOSEC) 40 mg DR capsule, Take 1 capsule (40 mg) by mouth once daily in the morning. Take before meals., Disp: 90 capsule, Rfl: 3    semaglutide (Ozempic) 2 mg/dose (8 mg/3 mL) pen injector, Inject 2 mg under the skin every 7 days., Disp: 3 mL, Rfl: 3    cyclobenzaprine (Flexeril) 10 mg tablet, Take 1 tablet (10 mg) by mouth 2 times a day as needed for muscle spasms for up to 10 days., Disp: 20 tablet, Rfl: 0    ibuprofen 600 mg tablet, Take 1  tablet (600 mg) by mouth every 8 hours if needed for mild pain (1 - 3)., Disp: , Rfl:   [2]   Past Medical History:  Diagnosis Date    Abnormal Pap smear of cervix 1990    CTS (carpal tunnel syndrome) 2016    Diverticulosis Unsure    Endometriosis     Fibroid 2017    GERD (gastroesophageal reflux disease)     Hyperlipidemia 01/13/1998    Hypertension     Irritable bowel syndrome Unsure    Kidney stone 2017    Liver disease     Pancreatitis (HHS-HCC) 1/7/2025    Pulmonary embolism    [3]   Past Surgical History:  Procedure Laterality Date    APPENDECTOMY      CHOLECYSTECTOMY      COLPOSCOPY  01/28/2025    HYSTERECTOMY  02/2019    LAPAROSCOPIC HYSTERECTOMY      TOTAL KNEE ARTHROPLASTY

## 2025-06-15 PROBLEM — E11.9 TYPE 2 DIABETES MELLITUS WITHOUT COMPLICATION, WITHOUT LONG-TERM CURRENT USE OF INSULIN: Status: ACTIVE | Noted: 2025-04-08

## 2025-06-15 NOTE — PATIENT INSTRUCTIONS
New Goals:   Nutrition- Continue Healthy Plate, avoid night snacking, Use protein shakes to avoid skipping meals,     Exercise- Begin Walking 3 times per week and add in resistance/strength exercise sessions 2-3 times per week    Medication- Ozempic 2.0 mg    Follow-up- 1 month

## 2025-06-23 ENCOUNTER — APPOINTMENT (OUTPATIENT)
Dept: CARDIOLOGY | Facility: HOSPITAL | Age: 50
End: 2025-06-23
Payer: COMMERCIAL

## 2025-06-23 ENCOUNTER — HOSPITAL ENCOUNTER (EMERGENCY)
Facility: HOSPITAL | Age: 50
Discharge: HOME | End: 2025-06-23
Attending: STUDENT IN AN ORGANIZED HEALTH CARE EDUCATION/TRAINING PROGRAM
Payer: COMMERCIAL

## 2025-06-23 ENCOUNTER — APPOINTMENT (OUTPATIENT)
Dept: RADIOLOGY | Facility: HOSPITAL | Age: 50
End: 2025-06-23
Payer: COMMERCIAL

## 2025-06-23 VITALS
SYSTOLIC BLOOD PRESSURE: 122 MMHG | HEIGHT: 64 IN | BODY MASS INDEX: 40.12 KG/M2 | DIASTOLIC BLOOD PRESSURE: 75 MMHG | TEMPERATURE: 98.2 F | OXYGEN SATURATION: 99 % | WEIGHT: 235 LBS | RESPIRATION RATE: 22 BRPM | HEART RATE: 90 BPM

## 2025-06-23 DIAGNOSIS — K85.90 ACUTE PANCREATITIS, UNSPECIFIED COMPLICATION STATUS, UNSPECIFIED PANCREATITIS TYPE (HHS-HCC): Primary | ICD-10-CM

## 2025-06-23 DIAGNOSIS — E87.6 HYPOKALEMIA: ICD-10-CM

## 2025-06-23 LAB
ALBUMIN SERPL BCP-MCNC: 4.8 G/DL (ref 3.4–5)
ALP SERPL-CCNC: 79 U/L (ref 33–110)
ALT SERPL W P-5'-P-CCNC: 17 U/L (ref 7–45)
ANION GAP SERPL CALC-SCNC: 16 MMOL/L (ref 10–20)
AST SERPL W P-5'-P-CCNC: 15 U/L (ref 9–39)
BASOPHILS # BLD AUTO: 0.06 X10*3/UL (ref 0–0.1)
BASOPHILS NFR BLD AUTO: 0.5 %
BILIRUB DIRECT SERPL-MCNC: 0 MG/DL (ref 0–0.3)
BILIRUB SERPL-MCNC: 0.7 MG/DL (ref 0–1.2)
BUN SERPL-MCNC: 14 MG/DL (ref 6–23)
CALCIUM SERPL-MCNC: 9.9 MG/DL (ref 8.6–10.3)
CHLORIDE SERPL-SCNC: 104 MMOL/L (ref 98–107)
CO2 SERPL-SCNC: 22 MMOL/L (ref 21–32)
CREAT SERPL-MCNC: 0.95 MG/DL (ref 0.5–1.05)
EGFRCR SERPLBLD CKD-EPI 2021: 74 ML/MIN/1.73M*2
EOSINOPHIL # BLD AUTO: 0.27 X10*3/UL (ref 0–0.7)
EOSINOPHIL NFR BLD AUTO: 2.2 %
ERYTHROCYTE [DISTWIDTH] IN BLOOD BY AUTOMATED COUNT: 13.2 % (ref 11.5–14.5)
GLUCOSE SERPL-MCNC: 120 MG/DL (ref 74–99)
HCT VFR BLD AUTO: 41.7 % (ref 36–46)
HGB BLD-MCNC: 13.9 G/DL (ref 12–16)
IMM GRANULOCYTES # BLD AUTO: 0.07 X10*3/UL (ref 0–0.7)
IMM GRANULOCYTES NFR BLD AUTO: 0.6 % (ref 0–0.9)
LIPASE SERPL-CCNC: 145 U/L (ref 9–82)
LYMPHOCYTES # BLD AUTO: 3.86 X10*3/UL (ref 1.2–4.8)
LYMPHOCYTES NFR BLD AUTO: 31.9 %
MCH RBC QN AUTO: 30.2 PG (ref 26–34)
MCHC RBC AUTO-ENTMCNC: 33.3 G/DL (ref 32–36)
MCV RBC AUTO: 91 FL (ref 80–100)
MONOCYTES # BLD AUTO: 0.51 X10*3/UL (ref 0.1–1)
MONOCYTES NFR BLD AUTO: 4.2 %
NEUTROPHILS # BLD AUTO: 7.33 X10*3/UL (ref 1.2–7.7)
NEUTROPHILS NFR BLD AUTO: 60.6 %
NRBC BLD-RTO: 0 /100 WBCS (ref 0–0)
PLATELET # BLD AUTO: 340 X10*3/UL (ref 150–450)
POTASSIUM SERPL-SCNC: 3.2 MMOL/L (ref 3.5–5.3)
PROT SERPL-MCNC: 7.5 G/DL (ref 6.4–8.2)
RBC # BLD AUTO: 4.61 X10*6/UL (ref 4–5.2)
SODIUM SERPL-SCNC: 139 MMOL/L (ref 136–145)
WBC # BLD AUTO: 12.1 X10*3/UL (ref 4.4–11.3)

## 2025-06-23 PROCEDURE — 83690 ASSAY OF LIPASE: CPT | Performed by: STUDENT IN AN ORGANIZED HEALTH CARE EDUCATION/TRAINING PROGRAM

## 2025-06-23 PROCEDURE — 85025 COMPLETE CBC W/AUTO DIFF WBC: CPT | Performed by: STUDENT IN AN ORGANIZED HEALTH CARE EDUCATION/TRAINING PROGRAM

## 2025-06-23 PROCEDURE — 93005 ELECTROCARDIOGRAM TRACING: CPT

## 2025-06-23 PROCEDURE — 2550000001 HC RX 255 CONTRASTS: Performed by: STUDENT IN AN ORGANIZED HEALTH CARE EDUCATION/TRAINING PROGRAM

## 2025-06-23 PROCEDURE — 80053 COMPREHEN METABOLIC PANEL: CPT | Performed by: STUDENT IN AN ORGANIZED HEALTH CARE EDUCATION/TRAINING PROGRAM

## 2025-06-23 PROCEDURE — 99285 EMERGENCY DEPT VISIT HI MDM: CPT | Mod: 25 | Performed by: STUDENT IN AN ORGANIZED HEALTH CARE EDUCATION/TRAINING PROGRAM

## 2025-06-23 PROCEDURE — 36415 COLL VENOUS BLD VENIPUNCTURE: CPT | Performed by: STUDENT IN AN ORGANIZED HEALTH CARE EDUCATION/TRAINING PROGRAM

## 2025-06-23 PROCEDURE — 74177 CT ABD & PELVIS W/CONTRAST: CPT | Mod: FOREIGN READ | Performed by: RADIOLOGY

## 2025-06-23 PROCEDURE — 74177 CT ABD & PELVIS W/CONTRAST: CPT

## 2025-06-23 PROCEDURE — 2500000005 HC RX 250 GENERAL PHARMACY W/O HCPCS: Performed by: STUDENT IN AN ORGANIZED HEALTH CARE EDUCATION/TRAINING PROGRAM

## 2025-06-23 PROCEDURE — 2500000001 HC RX 250 WO HCPCS SELF ADMINISTERED DRUGS (ALT 637 FOR MEDICARE OP): Performed by: STUDENT IN AN ORGANIZED HEALTH CARE EDUCATION/TRAINING PROGRAM

## 2025-06-23 PROCEDURE — 96376 TX/PRO/DX INJ SAME DRUG ADON: CPT

## 2025-06-23 PROCEDURE — 96375 TX/PRO/DX INJ NEW DRUG ADDON: CPT

## 2025-06-23 PROCEDURE — 2500000004 HC RX 250 GENERAL PHARMACY W/ HCPCS (ALT 636 FOR OP/ED): Performed by: STUDENT IN AN ORGANIZED HEALTH CARE EDUCATION/TRAINING PROGRAM

## 2025-06-23 PROCEDURE — 96374 THER/PROPH/DIAG INJ IV PUSH: CPT | Mod: 59

## 2025-06-23 RX ORDER — ALUMINUM HYDROXIDE, MAGNESIUM HYDROXIDE, AND SIMETHICONE 1200; 120; 1200 MG/30ML; MG/30ML; MG/30ML
30 SUSPENSION ORAL ONCE
Status: COMPLETED | OUTPATIENT
Start: 2025-06-23 | End: 2025-06-23

## 2025-06-23 RX ORDER — LIDOCAINE HYDROCHLORIDE 20 MG/ML
15 SOLUTION OROPHARYNGEAL ONCE
Status: COMPLETED | OUTPATIENT
Start: 2025-06-23 | End: 2025-06-23

## 2025-06-23 RX ORDER — MORPHINE SULFATE 4 MG/ML
4 INJECTION, SOLUTION INTRAMUSCULAR; INTRAVENOUS ONCE
Status: COMPLETED | OUTPATIENT
Start: 2025-06-23 | End: 2025-06-23

## 2025-06-23 RX ORDER — METOCLOPRAMIDE HYDROCHLORIDE 5 MG/ML
10 INJECTION INTRAMUSCULAR; INTRAVENOUS ONCE
Status: COMPLETED | OUTPATIENT
Start: 2025-06-23 | End: 2025-06-23

## 2025-06-23 RX ORDER — PANTOPRAZOLE SODIUM 40 MG/10ML
40 INJECTION, POWDER, LYOPHILIZED, FOR SOLUTION INTRAVENOUS ONCE
Status: COMPLETED | OUTPATIENT
Start: 2025-06-23 | End: 2025-06-23

## 2025-06-23 RX ORDER — HYDROCODONE BITARTRATE AND ACETAMINOPHEN 5; 325 MG/1; MG/1
1 TABLET ORAL EVERY 6 HOURS PRN
Qty: 10 TABLET | Refills: 0 | Status: SHIPPED | OUTPATIENT
Start: 2025-06-23 | End: 2025-06-26

## 2025-06-23 RX ORDER — POTASSIUM CHLORIDE 20 MEQ/1
20 TABLET, EXTENDED RELEASE ORAL 2 TIMES DAILY
Qty: 20 TABLET | Refills: 0 | Status: SHIPPED | OUTPATIENT
Start: 2025-06-23 | End: 2025-07-03

## 2025-06-23 RX ORDER — ONDANSETRON 4 MG/1
4 TABLET, ORALLY DISINTEGRATING ORAL EVERY 8 HOURS PRN
Qty: 20 TABLET | Refills: 0 | Status: SHIPPED | OUTPATIENT
Start: 2025-06-23 | End: 2025-06-30

## 2025-06-23 RX ADMIN — METOCLOPRAMIDE 10 MG: 5 INJECTION, SOLUTION INTRAMUSCULAR; INTRAVENOUS at 01:49

## 2025-06-23 RX ADMIN — LIDOCAINE HYDROCHLORIDE 15 ML: 20 SOLUTION ORAL at 01:49

## 2025-06-23 RX ADMIN — ALUMINUM HYDROXIDE, MAGNESIUM HYDROXIDE, AND DIMETHICONE 30 ML: 200; 20; 200 SUSPENSION ORAL at 01:49

## 2025-06-23 RX ADMIN — MORPHINE SULFATE 4 MG: 4 INJECTION, SOLUTION INTRAMUSCULAR; INTRAVENOUS at 06:43

## 2025-06-23 RX ADMIN — MORPHINE SULFATE 4 MG: 4 INJECTION, SOLUTION INTRAMUSCULAR; INTRAVENOUS at 02:52

## 2025-06-23 RX ADMIN — IOHEXOL 75 ML: 350 INJECTION, SOLUTION INTRAVENOUS at 03:03

## 2025-06-23 RX ADMIN — PANTOPRAZOLE SODIUM 40 MG: 40 INJECTION, POWDER, FOR SOLUTION INTRAVENOUS at 01:49

## 2025-06-23 ASSESSMENT — PAIN SCALES - GENERAL: PAINLEVEL_OUTOF10: 10 - WORST POSSIBLE PAIN

## 2025-06-23 ASSESSMENT — LIFESTYLE VARIABLES
TOTAL SCORE: 0
HAVE YOU EVER FELT YOU SHOULD CUT DOWN ON YOUR DRINKING: NO
HAVE PEOPLE ANNOYED YOU BY CRITICIZING YOUR DRINKING: NO
EVER FELT BAD OR GUILTY ABOUT YOUR DRINKING: NO
EVER HAD A DRINK FIRST THING IN THE MORNING TO STEADY YOUR NERVES TO GET RID OF A HANGOVER: NO

## 2025-06-23 ASSESSMENT — PAIN DESCRIPTION - LOCATION: LOCATION: ABDOMEN

## 2025-06-23 ASSESSMENT — PAIN DESCRIPTION - DESCRIPTORS: DESCRIPTORS: CRAMPING;SHARP

## 2025-06-23 ASSESSMENT — PAIN DESCRIPTION - FREQUENCY: FREQUENCY: CONSTANT/CONTINUOUS

## 2025-06-23 ASSESSMENT — PAIN DESCRIPTION - PAIN TYPE: TYPE: ACUTE PAIN

## 2025-06-23 ASSESSMENT — PAIN - FUNCTIONAL ASSESSMENT: PAIN_FUNCTIONAL_ASSESSMENT: 0-10

## 2025-06-24 LAB
ATRIAL RATE: 97 BPM
P AXIS: 63 DEGREES
P OFFSET: 210 MS
P ONSET: 148 MS
PR INTERVAL: 158 MS
Q ONSET: 227 MS
QRS COUNT: 16 BEATS
QRS DURATION: 90 MS
QT INTERVAL: 360 MS
QTC CALCULATION(BAZETT): 457 MS
QTC FREDERICIA: 422 MS
R AXIS: 18 DEGREES
T AXIS: 76 DEGREES
T OFFSET: 407 MS
VENTRICULAR RATE: 97 BPM

## 2025-06-24 NOTE — ED PROVIDER NOTES
HPI   Chief Complaint   Patient presents with    Abdominal Pain    Black or Bloody Stool       Patient is a 50-year-old female past medical history as below presents today for upper abdominal pain radiates to the back.  History of pancreatitis.  Associated with nausea no vomiting dysuria diarrhea              Patient History   Medical History[1]  Surgical History[2]  Family History[3]  Social History[4]    Physical Exam   ED Triage Vitals [06/23/25 0036]   Temperature Heart Rate Respirations BP   36.7 °C (98.1 °F) 98 (!) 22 119/73      Pulse Ox Temp Source Heart Rate Source Patient Position   100 % Temporal Monitor Sitting      BP Location FiO2 (%)     -- --       Physical Exam  Vitals and nursing note reviewed.   Constitutional:       Appearance: Normal appearance.   HENT:      Head: Normocephalic and atraumatic.      Nose: Nose normal.      Mouth/Throat:      Mouth: Mucous membranes are moist.   Eyes:      Conjunctiva/sclera: Conjunctivae normal.   Cardiovascular:      Rate and Rhythm: Normal rate and regular rhythm.      Pulses: Normal pulses.      Heart sounds: Normal heart sounds.   Pulmonary:      Effort: Pulmonary effort is normal.      Breath sounds: Normal breath sounds.   Abdominal:      General: Abdomen is flat.      Palpations: Abdomen is soft.      Tenderness: There is abdominal tenderness in the epigastric area. There is no guarding or rebound.   Musculoskeletal:         General: No deformity.   Neurological:      General: No focal deficit present.      Mental Status: She is alert and oriented to person, place, and time. Mental status is at baseline.   Psychiatric:         Mood and Affect: Mood normal.         Behavior: Behavior normal.           ED Course & MDM   ED Course as of 06/25/25 0044   Mon Jun 23, 2025   0334 ECG 12 lead  EKG shows normal sinus rhythm rate 97 normal intervals no ST-T wave changes [SE]   0615 ECG 12 lead [SE]      ED Course User Index  [SE] Trav Del Valle MD         Diagnoses  as of 25 0044   Acute pancreatitis, unspecified complication status, unspecified pancreatitis type (HHS-HCC)   Hypokalemia                 No data recorded     Alto Coma Scale Score: 15 (25 0037 : Eugenie Feliz RN)                           Medical Decision Making  Patient presents for evaluation of abdominal pain found to have pancreatitis mild hypokalemia.  She was given symptomatic treatment here with improvement and requested discharge home.  Advised supportive care pain medications prescribed.  Return precautions given    Amount and/or Complexity of Data Reviewed  Labs: ordered.  Radiology: ordered.  ECG/medicine tests: ordered and independent interpretation performed. Decision-making details documented in ED Course.    Risk  Prescription drug management.  Parenteral controlled substances.  Decision regarding hospitalization.        Procedure  Procedures         [1]   Past Medical History:  Diagnosis Date    Abnormal Pap smear of cervix     CTS (carpal tunnel syndrome)     Diverticulosis Unsure    Endometriosis     Fibroid     GERD (gastroesophageal reflux disease)     Hyperlipidemia 1998    Hypertension     Irritable bowel syndrome Unsure    Kidney stone 2017    Liver disease     Pancreatitis (HHS-HCC) 2025    Pulmonary embolism    [2]   Past Surgical History:  Procedure Laterality Date    APPENDECTOMY      CHOLECYSTECTOMY      COLPOSCOPY  2025    HYSTERECTOMY  2019    LAPAROSCOPIC HYSTERECTOMY      TOTAL KNEE ARTHROPLASTY     [3]   Family History  Problem Relation Name Age of Onset    Breast cancer Maternal Grandmother Grandmother     Colon cancer Neg Hx      Ovarian cancer Neg Hx      Uterine cancer Neg Hx     [4]   Social History  Tobacco Use    Smoking status: Former     Current packs/day: 0.00     Average packs/day: 1.5 packs/day for 15.0 years (22.5 ttl pk-yrs)     Types: Cigarettes     Quit date: 2017     Years since quittin.8    Smokeless tobacco:  "Never    Tobacco comments:     I quit   Vaping Use    Vaping status: Never Used   Substance Use Topics    Alcohol use: Not Currently     Comment: I rarely drank    Drug use: Not Currently     Types: \"Crack\" cocaine, Heroin     Comment: I have been sober sin 05/12/2015        Trav Del Valle MD  06/25/25 0044    "

## 2025-07-03 ENCOUNTER — APPOINTMENT (OUTPATIENT)
Dept: RADIOLOGY | Facility: CLINIC | Age: 50
End: 2025-07-03
Payer: COMMERCIAL

## 2025-07-08 ENCOUNTER — APPOINTMENT (OUTPATIENT)
Dept: NEPHROLOGY | Facility: CLINIC | Age: 50
End: 2025-07-08
Payer: COMMERCIAL

## 2025-07-10 ENCOUNTER — APPOINTMENT (OUTPATIENT)
Dept: ENDOCRINOLOGY | Facility: CLINIC | Age: 50
End: 2025-07-10
Payer: COMMERCIAL

## 2025-07-24 ENCOUNTER — HOSPITAL ENCOUNTER (OUTPATIENT)
Dept: RADIOLOGY | Facility: CLINIC | Age: 50
Discharge: HOME | End: 2025-07-24
Payer: COMMERCIAL

## 2025-07-24 ENCOUNTER — OFFICE VISIT (OUTPATIENT)
Dept: ORTHOPEDIC SURGERY | Facility: CLINIC | Age: 50
End: 2025-07-24
Payer: COMMERCIAL

## 2025-07-24 DIAGNOSIS — M25.461 PAIN AND SWELLING OF KNEE, RIGHT: ICD-10-CM

## 2025-07-24 DIAGNOSIS — M17.11 OSTEOARTHRITIS OF RIGHT KNEE, UNSPECIFIED OSTEOARTHRITIS TYPE: ICD-10-CM

## 2025-07-24 DIAGNOSIS — M25.561 PAIN AND SWELLING OF KNEE, RIGHT: ICD-10-CM

## 2025-07-24 PROCEDURE — 73560 X-RAY EXAM OF KNEE 1 OR 2: CPT | Mod: RIGHT SIDE | Performed by: RADIOLOGY

## 2025-07-24 PROCEDURE — 99202 OFFICE O/P NEW SF 15 MIN: CPT | Performed by: ORTHOPAEDIC SURGERY

## 2025-07-24 PROCEDURE — 3044F HG A1C LEVEL LT 7.0%: CPT | Performed by: ORTHOPAEDIC SURGERY

## 2025-07-24 PROCEDURE — 99203 OFFICE O/P NEW LOW 30 MIN: CPT | Performed by: ORTHOPAEDIC SURGERY

## 2025-07-24 PROCEDURE — 73560 X-RAY EXAM OF KNEE 1 OR 2: CPT | Mod: RT

## 2025-07-24 ASSESSMENT — PAIN - FUNCTIONAL ASSESSMENT: PAIN_FUNCTIONAL_ASSESSMENT: 0-10

## 2025-07-24 ASSESSMENT — PAIN SCALES - GENERAL: PAINLEVEL_OUTOF10: 7

## 2025-07-25 PROBLEM — M25.561 PAIN AND SWELLING OF KNEE, RIGHT: Status: ACTIVE | Noted: 2025-07-25

## 2025-07-25 PROBLEM — M25.461 PAIN AND SWELLING OF KNEE, RIGHT: Status: ACTIVE | Noted: 2025-07-25

## 2025-07-25 NOTE — PROGRESS NOTES
Patient is a 50-year-old female who presents today for evaluation of painful right patellofemoral arthroplasty.  Her surgery was performed at the [facility].  This was around 2020.  She did not have any complications following surgery in terms of wound healing or infection.  She has gone on to develop progressive pain over time.    Right knee:  AAOx3, NAD, walks with a moderate antalgic gait  Varus allignment  Range of motion lacks 10 degrees of full extension and flexes to 110 degrees, significant pain with any range of motion  Stable to varus/valgus/anterior/posterior stress through out the range of motion  Tender palpation in multiple areas anteriorly into the quad tendon the patella itself and the patellar tendon  Moderate effusion  SILT in a priya/saph/per/tib distribution  5/5 knee extension/df/pf/ehl  ½ dorsalis pedis and posterior tibial pulse  no popliteal lymphadenopathy  no other overlying lesions  mood: euthymic  Respirations non labored  Incision is healed with no signs of surrounding infection    Plain films were reviewed by myself in clinic today.  She has a patellofemoral arthroplasty in place with no gross signs of loosening or failure.  She has a osteophytic change in other compartments of the knee.    The patient is a 50-year-old female with a painful patellofemoral arthroplasty.  Will get an ESR and CRP to rule out infection.  Depending the results of this she may require aspiration.  We may get an MRI for further evaluation of her remaining compartments.  Some of her pain seems to be soft tissue in nature.  Her only option may be conversion to a total knee replacement.  I did discuss that patients who undergo patellofemoral arthroplasty converted to a total knee arthroplasty or not a satisfied this patient's who originally had a total knee arthroplasty in the first place.  She is only 50 years old.  Once we have the results of the labs I can advise her better on next steps.  All of her  questions were answered.    This note was created using voice recognition software and was not corrected for typographical or grammatical errors.

## 2025-07-26 LAB
CRP SERPL-MCNC: NORMAL MG/L
ERYTHROCYTE [SEDIMENTATION RATE] IN BLOOD BY WESTERGREN METHOD: NORMAL MM/H

## 2025-07-28 LAB
CRP SERPL-MCNC: <3 MG/L
ERYTHROCYTE [SEDIMENTATION RATE] IN BLOOD BY WESTERGREN METHOD: NORMAL MM/H

## 2025-08-04 ENCOUNTER — APPOINTMENT (OUTPATIENT)
Dept: DERMATOLOGY | Facility: CLINIC | Age: 50
End: 2025-08-04
Payer: COMMERCIAL

## 2025-08-04 DIAGNOSIS — L73.8 PSEUDOFOLLICULITIS: Primary | ICD-10-CM

## 2025-08-04 DIAGNOSIS — L81.0 POST-INFLAMMATORY HYPERPIGMENTATION: ICD-10-CM

## 2025-08-04 PROCEDURE — 3044F HG A1C LEVEL LT 7.0%: CPT | Performed by: STUDENT IN AN ORGANIZED HEALTH CARE EDUCATION/TRAINING PROGRAM

## 2025-08-04 PROCEDURE — 99204 OFFICE O/P NEW MOD 45 MIN: CPT | Performed by: STUDENT IN AN ORGANIZED HEALTH CARE EDUCATION/TRAINING PROGRAM

## 2025-08-04 RX ORDER — CLINDAMYCIN PHOSPHATE 10 MG/G
GEL TOPICAL DAILY
Qty: 60 G | Refills: 11 | Status: SHIPPED | OUTPATIENT
Start: 2025-08-04 | End: 2026-08-04

## 2025-08-04 RX ORDER — HYDROQUINONE 40 MG/G
CREAM TOPICAL
Qty: 28 G | Refills: 3 | Status: SHIPPED | OUTPATIENT
Start: 2025-08-04

## 2025-08-04 ASSESSMENT — DERMATOLOGY QUALITY OF LIFE (QOL) ASSESSMENT
RATE HOW BOTHERED YOU ARE BY SYMPTOMS OF YOUR SKIN PROBLEM (EG, ITCHING, STINGING BURNING, HURTING OR SKIN IRRITATION): 5
RATE HOW EMOTIONALLY BOTHERED YOU ARE BY YOUR SKIN PROBLEM (FOR EXAMPLE, WORRY, EMBARRASSMENT, FRUSTRATION): 2
RATE HOW BOTHERED YOU ARE BY EFFECTS OF YOUR SKIN PROBLEMS ON YOUR ACTIVITIES (EG, GOING OUT, ACCOMPLISHING WHAT YOU WANT, WORK ACTIVITIES OR YOUR RELATIONSHIPS WITH OTHERS): 1
WHAT SINGLE SKIN CONDITION LISTED BELOW IS THE PATIENT ANSWERING THE QUALITY-OF-LIFE ASSESSMENT QUESTIONS ABOUT: DERMATITIS
WHAT SINGLE SKIN CONDITION LISTED BELOW IS THE PATIENT ANSWERING THE QUALITY-OF-LIFE ASSESSMENT QUESTIONS ABOUT: DERMATITIS
RATE HOW EMOTIONALLY BOTHERED YOU ARE BY YOUR SKIN PROBLEM (FOR EXAMPLE, WORRY, EMBARRASSMENT, FRUSTRATION): 2
RATE HOW BOTHERED YOU ARE BY EFFECTS OF YOUR SKIN PROBLEMS ON YOUR ACTIVITIES (EG, GOING OUT, ACCOMPLISHING WHAT YOU WANT, WORK ACTIVITIES OR YOUR RELATIONSHIPS WITH OTHERS): 1
RATE HOW BOTHERED YOU ARE BY SYMPTOMS OF YOUR SKIN PROBLEM (EG, ITCHING, STINGING BURNING, HURTING OR SKIN IRRITATION): 5

## 2025-08-04 ASSESSMENT — PATIENT GLOBAL ASSESSMENT (PGA): WHAT IS THE PGA: PATIENT GLOBAL ASSESSMENT:  1 - CLEAR

## 2025-08-04 NOTE — Clinical Note
Numerous hyperpigmented papules on chin and neck    Discussed diagnosis, related to plucking of hairs  Hx PCOS  Start clindagel after shaving area  Start adapalene 0.1% gel before bed. Start 2-3 times per week and slowly increase to nightly. Use with moisturizer. Side effects of topical retinoids reviewed including increased photosensitivity, dryness, irritation and redness.

## 2025-08-04 NOTE — PROGRESS NOTES
Subjective     Tor Patino is a 50 y.o. female who presents for the following: Pigment Change (To face, secondary to trauma from plucking excess facial hair. Hx of PCOS. Has used hydroquinone in the past which helped, recently changed insurance, establishing care with new providers.  ).     Intake Questions  Do you have any new or changing Lesions?: (Patient-Rptd) (P) No  Are you an organ transplant recipient?: (Patient-Rptd) (P) No  Have you had or do you have a Staph Infection?: (Patient-Rptd) (P) No  Have you had or do you have Vacular Disease?: (Patient-Rptd) (P) No  Do you use sunscreen?: (Patient-Rptd) (P) None  Do you use a tanning bed?: (Patient-Rptd) (P) No  Are you trying to get pregnant?: (Patient-Rptd) (P) No  Are you on birth control?: (Patient-Rptd) (P) No  Do you have irregular menstrual cycles?: (Patient-Rptd) (P) No    Review of Systems:  No other skin or systemic complaints other than what is documented elsewhere in the note.    The following portions of the chart were reviewed this encounter and updated as appropriate:          Skin Cancer History  Biopsy Log Book  No skin cancers from Specimen Tracking.    Additional History      Specialty Problems          Dermatology Problems    Acquired keratoderma    Benign neoplasm of skin of lower limb    Xerosis cutis        Objective   Well appearing patient in no apparent distress; mood and affect are within normal limits.    A focused skin examination was performed. All findings within normal limits unless otherwise noted below.    Assessment/Plan   Skin Exam  1. PSEUDOFOLLICULITIS  Head - Anterior (Face)  Numerous hyperpigmented papules on chin and neck    Discussed diagnosis, related to plucking of hairs  Hx PCOS  Start clindagel after shaving area  Start adapalene 0.1% gel before bed. Start 2-3 times per week and slowly increase to nightly. Use with moisturizer. Side effects of topical retinoids reviewed including increased photosensitivity,  dryness, irritation and redness.     - clindamycin (Cleocin T) 1 % gel - Head - Anterior (Face) - Apply topically once daily. To face  2. POST-INFLAMMATORY HYPERPIGMENTATION  Head - Anterior (Face)  PIH from inflammation for PFB    START hydroquinone 4% cream bid. 2 months on 1 month off repeat as needed  Sun protection  - hydroquinone 4 % cream - Head - Anterior (Face) - Apply to affected areas of darkened skin twice a day for 2 months. Then take 1 month off. May restart cycle as needed.

## 2025-08-04 NOTE — Clinical Note
PIH from inflammation for PFB    START hydroquinone 4% cream bid. 2 months on 1 month off repeat as needed  Sun protection

## 2025-08-07 ENCOUNTER — HOSPITAL ENCOUNTER (OUTPATIENT)
Dept: RADIOLOGY | Facility: HOSPITAL | Age: 50
Discharge: HOME | End: 2025-08-07
Payer: COMMERCIAL

## 2025-08-07 ENCOUNTER — TELEPHONE (OUTPATIENT)
Dept: ENDOCRINOLOGY | Facility: CLINIC | Age: 50
End: 2025-08-07

## 2025-08-07 ENCOUNTER — APPOINTMENT (OUTPATIENT)
Dept: ENDOCRINOLOGY | Facility: CLINIC | Age: 50
End: 2025-08-07
Payer: COMMERCIAL

## 2025-08-07 DIAGNOSIS — K85.90 ACUTE PANCREATITIS WITHOUT NECROSIS OR INFECTION, UNSPECIFIED (HHS-HCC): ICD-10-CM

## 2025-08-07 PROCEDURE — 2550000001 HC RX 255 CONTRASTS

## 2025-08-07 PROCEDURE — 74183 MRI ABD W/O CNTR FLWD CNTR: CPT

## 2025-08-07 PROCEDURE — A9575 INJ GADOTERATE MEGLUMI 0.1ML: HCPCS

## 2025-08-07 RX ORDER — GADOTERATE MEGLUMINE 376.9 MG/ML
18 INJECTION INTRAVENOUS
Status: COMPLETED | OUTPATIENT
Start: 2025-08-07 | End: 2025-08-07

## 2025-08-07 RX ADMIN — GADOTERATE MEGLUMINE 18 ML: 376.9 INJECTION INTRAVENOUS at 19:38

## 2025-08-07 NOTE — TELEPHONE ENCOUNTER
Prior Auth for ozempic pending determination  Submitted on 8/7 via [facility]    KEY: [id number]

## 2025-08-14 ENCOUNTER — APPOINTMENT (OUTPATIENT)
Dept: ENDOCRINOLOGY | Facility: CLINIC | Age: 50
End: 2025-08-14
Payer: COMMERCIAL

## 2025-08-21 ENCOUNTER — APPOINTMENT (OUTPATIENT)
Dept: ENDOCRINOLOGY | Facility: CLINIC | Age: 50
End: 2025-08-21
Payer: COMMERCIAL

## 2025-08-21 VITALS
WEIGHT: 234 LBS | HEIGHT: 64 IN | BODY MASS INDEX: 39.95 KG/M2 | SYSTOLIC BLOOD PRESSURE: 121 MMHG | DIASTOLIC BLOOD PRESSURE: 82 MMHG | HEART RATE: 95 BPM

## 2025-08-21 DIAGNOSIS — E78.2 MIXED HYPERLIPIDEMIA: ICD-10-CM

## 2025-08-21 DIAGNOSIS — E66.01 MORBID OBESITY WITH BMI OF 40.0-44.9, ADULT (MULTI): Primary | ICD-10-CM

## 2025-08-21 DIAGNOSIS — E11.9 TYPE 2 DIABETES MELLITUS WITHOUT COMPLICATION, WITHOUT LONG-TERM CURRENT USE OF INSULIN: ICD-10-CM

## 2025-08-21 PROCEDURE — 3074F SYST BP LT 130 MM HG: CPT | Performed by: NURSE PRACTITIONER

## 2025-08-21 PROCEDURE — 3044F HG A1C LEVEL LT 7.0%: CPT | Performed by: NURSE PRACTITIONER

## 2025-08-21 PROCEDURE — 99215 OFFICE O/P EST HI 40 MIN: CPT | Performed by: NURSE PRACTITIONER

## 2025-08-21 PROCEDURE — 3079F DIAST BP 80-89 MM HG: CPT | Performed by: NURSE PRACTITIONER

## 2025-08-21 PROCEDURE — 3008F BODY MASS INDEX DOCD: CPT | Performed by: NURSE PRACTITIONER

## 2025-08-21 ASSESSMENT — PATIENT HEALTH QUESTIONNAIRE - PHQ9
SUM OF ALL RESPONSES TO PHQ9 QUESTIONS 1 AND 2: 0
2. FEELING DOWN, DEPRESSED OR HOPELESS: NOT AT ALL
1. LITTLE INTEREST OR PLEASURE IN DOING THINGS: NOT AT ALL

## 2025-08-21 ASSESSMENT — ENCOUNTER SYMPTOMS
DEPRESSION: 0
OCCASIONAL FEELINGS OF UNSTEADINESS: 0

## 2025-09-05 LAB
CHOLEST SERPL-MCNC: 172 MG/DL
CHOLEST/HDLC SERPL: 3.9 (CALC)
EST. AVERAGE GLUCOSE BLD GHB EST-MCNC: 114 MG/DL
EST. AVERAGE GLUCOSE BLD GHB EST-SCNC: 6.3 MMOL/L
HBA1C MFR BLD: 5.6 %
HDLC SERPL-MCNC: 44 MG/DL
LDLC SERPL CALC-MCNC: 106 MG/DL (CALC)
NONHDLC SERPL-MCNC: 128 MG/DL (CALC)
TRIGL SERPL-MCNC: 122 MG/DL

## 2025-09-06 ENCOUNTER — DOCUMENTATION (OUTPATIENT)
Dept: ENDOCRINOLOGY | Facility: CLINIC | Age: 50
End: 2025-09-06
Payer: COMMERCIAL

## 2025-09-06 DIAGNOSIS — E11.65 CONTROLLED TYPE 2 DIABETES MELLITUS WITH HYPERGLYCEMIA, WITHOUT LONG-TERM CURRENT USE OF INSULIN: ICD-10-CM

## 2025-09-06 RX ORDER — METFORMIN HYDROCHLORIDE 500 MG/1
500 TABLET ORAL DAILY
Qty: 60 TABLET | Refills: 5 | Status: SHIPPED | OUTPATIENT
Start: 2025-09-06